# Patient Record
Sex: FEMALE | Race: ASIAN | NOT HISPANIC OR LATINO
[De-identification: names, ages, dates, MRNs, and addresses within clinical notes are randomized per-mention and may not be internally consistent; named-entity substitution may affect disease eponyms.]

---

## 2023-01-01 ENCOUNTER — TRANSCRIPTION ENCOUNTER (OUTPATIENT)
Age: 88
End: 2023-01-01

## 2023-01-01 ENCOUNTER — INPATIENT (INPATIENT)
Facility: HOSPITAL | Age: 88
LOS: 12 days | Discharge: HOSPICE | DRG: 64 | End: 2023-09-29
Attending: STUDENT IN AN ORGANIZED HEALTH CARE EDUCATION/TRAINING PROGRAM | Admitting: INTERNAL MEDICINE
Payer: MEDICARE

## 2023-01-01 ENCOUNTER — INPATIENT (INPATIENT)
Facility: HOSPITAL | Age: 88
LOS: 4 days | DRG: 951 | End: 2023-10-04
Attending: INTERNAL MEDICINE | Admitting: INTERNAL MEDICINE
Payer: OTHER MISCELLANEOUS

## 2023-01-01 VITALS
WEIGHT: 112.44 LBS | TEMPERATURE: 98 F | SYSTOLIC BLOOD PRESSURE: 111 MMHG | OXYGEN SATURATION: 100 % | DIASTOLIC BLOOD PRESSURE: 75 MMHG | HEART RATE: 69 BPM | RESPIRATION RATE: 20 BRPM | HEIGHT: 60 IN

## 2023-01-01 VITALS
OXYGEN SATURATION: 100 % | RESPIRATION RATE: 20 BRPM | HEART RATE: 69 BPM | TEMPERATURE: 98 F | DIASTOLIC BLOOD PRESSURE: 75 MMHG | SYSTOLIC BLOOD PRESSURE: 111 MMHG

## 2023-01-01 VITALS
RESPIRATION RATE: 24 BRPM | DIASTOLIC BLOOD PRESSURE: 56 MMHG | OXYGEN SATURATION: 88 % | HEART RATE: 160 BPM | SYSTOLIC BLOOD PRESSURE: 89 MMHG | TEMPERATURE: 100 F

## 2023-01-01 VITALS
SYSTOLIC BLOOD PRESSURE: 142 MMHG | HEART RATE: 78 BPM | DIASTOLIC BLOOD PRESSURE: 80 MMHG | RESPIRATION RATE: 16 BRPM | OXYGEN SATURATION: 97 % | WEIGHT: 112.44 LBS

## 2023-01-01 DIAGNOSIS — I10 ESSENTIAL (PRIMARY) HYPERTENSION: ICD-10-CM

## 2023-01-01 DIAGNOSIS — R29.732 NIHSS SCORE 32: ICD-10-CM

## 2023-01-01 DIAGNOSIS — R53.2 FUNCTIONAL QUADRIPLEGIA: ICD-10-CM

## 2023-01-01 DIAGNOSIS — I48.91 UNSPECIFIED ATRIAL FIBRILLATION: ICD-10-CM

## 2023-01-01 DIAGNOSIS — I63.512 CEREBRAL INFARCTION DUE TO UNSPECIFIED OCCLUSION OR STENOSIS OF LEFT MIDDLE CEREBRAL ARTERY: ICD-10-CM

## 2023-01-01 DIAGNOSIS — R45.1 RESTLESSNESS AND AGITATION: ICD-10-CM

## 2023-01-01 DIAGNOSIS — I63.30 CEREBRAL INFARCTION DUE TO THROMBOSIS OF UNSPECIFIED CEREBRAL ARTERY: ICD-10-CM

## 2023-01-01 DIAGNOSIS — Z71.89 OTHER SPECIFIED COUNSELING: ICD-10-CM

## 2023-01-01 DIAGNOSIS — J15.5 PNEUMONIA DUE TO ESCHERICHIA COLI: ICD-10-CM

## 2023-01-01 DIAGNOSIS — F41.9 ANXIETY DISORDER, UNSPECIFIED: ICD-10-CM

## 2023-01-01 DIAGNOSIS — Z91.89 OTHER SPECIFIED PERSONAL RISK FACTORS, NOT ELSEWHERE CLASSIFIED: ICD-10-CM

## 2023-01-01 DIAGNOSIS — R56.9 UNSPECIFIED CONVULSIONS: ICD-10-CM

## 2023-01-01 DIAGNOSIS — J69.0 PNEUMONITIS DUE TO INHALATION OF FOOD AND VOMIT: ICD-10-CM

## 2023-01-01 DIAGNOSIS — Z51.5 ENCOUNTER FOR PALLIATIVE CARE: ICD-10-CM

## 2023-01-01 DIAGNOSIS — E11.9 TYPE 2 DIABETES MELLITUS WITHOUT COMPLICATIONS: ICD-10-CM

## 2023-01-01 DIAGNOSIS — Z79.82 LONG TERM (CURRENT) USE OF ASPIRIN: ICD-10-CM

## 2023-01-01 DIAGNOSIS — Z78.1 PHYSICAL RESTRAINT STATUS: ICD-10-CM

## 2023-01-01 DIAGNOSIS — R68.89 OTHER GENERAL SYMPTOMS AND SIGNS: ICD-10-CM

## 2023-01-01 DIAGNOSIS — E87.0 HYPEROSMOLALITY AND HYPERNATREMIA: ICD-10-CM

## 2023-01-01 DIAGNOSIS — Z41.8 ENCOUNTER FOR OTHER PROCEDURES FOR PURPOSES OTHER THAN REMEDYING HEALTH STATE: ICD-10-CM

## 2023-01-01 DIAGNOSIS — Z86.73 PERSONAL HISTORY OF TRANSIENT ISCHEMIC ATTACK (TIA), AND CEREBRAL INFARCTION WITHOUT RESIDUAL DEFICITS: ICD-10-CM

## 2023-01-01 DIAGNOSIS — R06.00 DYSPNEA, UNSPECIFIED: ICD-10-CM

## 2023-01-01 DIAGNOSIS — E78.5 HYPERLIPIDEMIA, UNSPECIFIED: ICD-10-CM

## 2023-01-01 DIAGNOSIS — I63.9 CEREBRAL INFARCTION, UNSPECIFIED: ICD-10-CM

## 2023-01-01 DIAGNOSIS — J96.01 ACUTE RESPIRATORY FAILURE WITH HYPOXIA: ICD-10-CM

## 2023-01-01 DIAGNOSIS — D11.9 BENIGN NEOPLASM OF MAJOR SALIVARY GLAND, UNSPECIFIED: ICD-10-CM

## 2023-01-01 LAB
-  AMIKACIN: SIGNIFICANT CHANGE UP
-  AMPICILLIN/SULBACTAM: SIGNIFICANT CHANGE UP
-  AMPICILLIN: SIGNIFICANT CHANGE UP
-  CEFAZOLIN: SIGNIFICANT CHANGE UP
-  CEFTRIAXONE: SIGNIFICANT CHANGE UP
-  CIPROFLOXACIN: SIGNIFICANT CHANGE UP
-  ERTAPENEM: SIGNIFICANT CHANGE UP
-  GENTAMICIN: SIGNIFICANT CHANGE UP
-  MEROPENEM: SIGNIFICANT CHANGE UP
-  PIPERACILLIN/TAZOBACTAM: SIGNIFICANT CHANGE UP
-  TOBRAMYCIN: SIGNIFICANT CHANGE UP
-  TRIMETHOPRIM/SULFAMETHOXAZOLE: SIGNIFICANT CHANGE UP
A1C WITH ESTIMATED AVERAGE GLUCOSE RESULT: 6.4 % — HIGH (ref 4–5.6)
ALBUMIN SERPL ELPH-MCNC: 2.8 G/DL — LOW (ref 3.3–5)
ALBUMIN SERPL ELPH-MCNC: 2.8 G/DL — LOW (ref 3.3–5)
ALBUMIN SERPL ELPH-MCNC: 3 G/DL — LOW (ref 3.3–5)
ALBUMIN SERPL ELPH-MCNC: 3.1 G/DL — LOW (ref 3.3–5)
ALBUMIN SERPL ELPH-MCNC: 3.1 G/DL — LOW (ref 3.3–5)
ALBUMIN SERPL ELPH-MCNC: 3.3 G/DL — SIGNIFICANT CHANGE UP (ref 3.3–5)
ALBUMIN SERPL ELPH-MCNC: 3.3 G/DL — SIGNIFICANT CHANGE UP (ref 3.3–5)
ALBUMIN SERPL ELPH-MCNC: 3.4 G/DL — SIGNIFICANT CHANGE UP (ref 3.3–5)
ALBUMIN SERPL ELPH-MCNC: 3.5 G/DL — SIGNIFICANT CHANGE UP (ref 3.3–5)
ALBUMIN SERPL ELPH-MCNC: 3.5 G/DL — SIGNIFICANT CHANGE UP (ref 3.3–5)
ALBUMIN SERPL ELPH-MCNC: 3.7 G/DL — SIGNIFICANT CHANGE UP (ref 3.3–5)
ALBUMIN SERPL ELPH-MCNC: 3.7 G/DL — SIGNIFICANT CHANGE UP (ref 3.3–5)
ALBUMIN SERPL ELPH-MCNC: 4.5 G/DL — SIGNIFICANT CHANGE UP (ref 3.3–5)
ALBUMIN SERPL ELPH-MCNC: 4.8 G/DL — SIGNIFICANT CHANGE UP (ref 3.3–5)
ALP SERPL-CCNC: 46 U/L — SIGNIFICANT CHANGE UP (ref 40–120)
ALP SERPL-CCNC: 48 U/L — SIGNIFICANT CHANGE UP (ref 40–120)
ALP SERPL-CCNC: 50 U/L — SIGNIFICANT CHANGE UP (ref 40–120)
ALP SERPL-CCNC: 53 U/L — SIGNIFICANT CHANGE UP (ref 40–120)
ALP SERPL-CCNC: 55 U/L — SIGNIFICANT CHANGE UP (ref 40–120)
ALP SERPL-CCNC: 56 U/L — SIGNIFICANT CHANGE UP (ref 40–120)
ALP SERPL-CCNC: 58 U/L — SIGNIFICANT CHANGE UP (ref 40–120)
ALP SERPL-CCNC: 60 U/L — SIGNIFICANT CHANGE UP (ref 40–120)
ALP SERPL-CCNC: 61 U/L — SIGNIFICANT CHANGE UP (ref 40–120)
ALP SERPL-CCNC: 61 U/L — SIGNIFICANT CHANGE UP (ref 40–120)
ALP SERPL-CCNC: 65 U/L — SIGNIFICANT CHANGE UP (ref 40–120)
ALP SERPL-CCNC: 66 U/L — SIGNIFICANT CHANGE UP (ref 40–120)
ALP SERPL-CCNC: 67 U/L — SIGNIFICANT CHANGE UP (ref 40–120)
ALP SERPL-CCNC: 77 U/L — SIGNIFICANT CHANGE UP (ref 40–120)
ALT FLD-CCNC: 10 U/L — SIGNIFICANT CHANGE UP (ref 10–45)
ALT FLD-CCNC: 11 U/L — SIGNIFICANT CHANGE UP (ref 10–45)
ALT FLD-CCNC: 12 U/L — SIGNIFICANT CHANGE UP (ref 10–45)
ALT FLD-CCNC: 12 U/L — SIGNIFICANT CHANGE UP (ref 10–45)
ALT FLD-CCNC: 14 U/L — SIGNIFICANT CHANGE UP (ref 10–45)
ALT FLD-CCNC: 15 U/L — SIGNIFICANT CHANGE UP (ref 10–45)
ALT FLD-CCNC: 16 U/L — SIGNIFICANT CHANGE UP (ref 10–45)
ALT FLD-CCNC: 8 U/L — LOW (ref 10–45)
ALT FLD-CCNC: 8 U/L — LOW (ref 10–45)
ALT FLD-CCNC: 9 U/L — LOW (ref 10–45)
ANION GAP SERPL CALC-SCNC: 10 MMOL/L — SIGNIFICANT CHANGE UP (ref 5–17)
ANION GAP SERPL CALC-SCNC: 11 MMOL/L — SIGNIFICANT CHANGE UP (ref 5–17)
ANION GAP SERPL CALC-SCNC: 11 MMOL/L — SIGNIFICANT CHANGE UP (ref 5–17)
ANION GAP SERPL CALC-SCNC: 12 MMOL/L — SIGNIFICANT CHANGE UP (ref 5–17)
ANION GAP SERPL CALC-SCNC: 12 MMOL/L — SIGNIFICANT CHANGE UP (ref 5–17)
ANION GAP SERPL CALC-SCNC: 14 MMOL/L — SIGNIFICANT CHANGE UP (ref 5–17)
ANION GAP SERPL CALC-SCNC: 6 MMOL/L — SIGNIFICANT CHANGE UP (ref 5–17)
ANION GAP SERPL CALC-SCNC: 7 MMOL/L — SIGNIFICANT CHANGE UP (ref 5–17)
ANION GAP SERPL CALC-SCNC: 8 MMOL/L — SIGNIFICANT CHANGE UP (ref 5–17)
ANION GAP SERPL CALC-SCNC: 9 MMOL/L — SIGNIFICANT CHANGE UP (ref 5–17)
ANION GAP SERPL CALC-SCNC: 9 MMOL/L — SIGNIFICANT CHANGE UP (ref 5–17)
ANISOCYTOSIS BLD QL: SLIGHT — SIGNIFICANT CHANGE UP
ANISOCYTOSIS BLD QL: SLIGHT — SIGNIFICANT CHANGE UP
APTT BLD: 33.3 SEC — SIGNIFICANT CHANGE UP (ref 24.5–35.6)
AST SERPL-CCNC: 25 U/L — SIGNIFICANT CHANGE UP (ref 10–40)
AST SERPL-CCNC: 27 U/L — SIGNIFICANT CHANGE UP (ref 10–40)
AST SERPL-CCNC: 27 U/L — SIGNIFICANT CHANGE UP (ref 10–40)
AST SERPL-CCNC: 28 U/L — SIGNIFICANT CHANGE UP (ref 10–40)
AST SERPL-CCNC: 31 U/L — SIGNIFICANT CHANGE UP (ref 10–40)
AST SERPL-CCNC: 32 U/L — SIGNIFICANT CHANGE UP (ref 10–40)
AST SERPL-CCNC: 33 U/L — SIGNIFICANT CHANGE UP (ref 10–40)
AST SERPL-CCNC: 34 U/L — SIGNIFICANT CHANGE UP (ref 10–40)
AST SERPL-CCNC: 37 U/L — SIGNIFICANT CHANGE UP (ref 10–40)
AST SERPL-CCNC: 39 U/L — SIGNIFICANT CHANGE UP (ref 10–40)
AST SERPL-CCNC: 41 U/L — HIGH (ref 10–40)
AST SERPL-CCNC: 45 U/L — HIGH (ref 10–40)
AST SERPL-CCNC: 46 U/L — HIGH (ref 10–40)
AST SERPL-CCNC: 47 U/L — HIGH (ref 10–40)
BASE EXCESS BLDA CALC-SCNC: -3.9 MMOL/L — LOW (ref -2–3)
BASOPHILS # BLD AUTO: 0 K/UL — SIGNIFICANT CHANGE UP (ref 0–0.2)
BASOPHILS # BLD AUTO: 0.03 K/UL — SIGNIFICANT CHANGE UP (ref 0–0.2)
BASOPHILS # BLD AUTO: 0.04 K/UL — SIGNIFICANT CHANGE UP (ref 0–0.2)
BASOPHILS # BLD AUTO: 0.05 K/UL — SIGNIFICANT CHANGE UP (ref 0–0.2)
BASOPHILS # BLD AUTO: 0.06 K/UL — SIGNIFICANT CHANGE UP (ref 0–0.2)
BASOPHILS # BLD AUTO: 0.07 K/UL — SIGNIFICANT CHANGE UP (ref 0–0.2)
BASOPHILS # BLD AUTO: 0.09 K/UL — SIGNIFICANT CHANGE UP (ref 0–0.2)
BASOPHILS # BLD AUTO: 0.09 K/UL — SIGNIFICANT CHANGE UP (ref 0–0.2)
BASOPHILS # BLD AUTO: 0.1 K/UL — SIGNIFICANT CHANGE UP (ref 0–0.2)
BASOPHILS NFR BLD AUTO: 0 % — SIGNIFICANT CHANGE UP (ref 0–2)
BASOPHILS NFR BLD AUTO: 0.2 % — SIGNIFICANT CHANGE UP (ref 0–2)
BASOPHILS NFR BLD AUTO: 0.2 % — SIGNIFICANT CHANGE UP (ref 0–2)
BASOPHILS NFR BLD AUTO: 0.3 % — SIGNIFICANT CHANGE UP (ref 0–2)
BASOPHILS NFR BLD AUTO: 0.4 % — SIGNIFICANT CHANGE UP (ref 0–2)
BASOPHILS NFR BLD AUTO: 0.5 % — SIGNIFICANT CHANGE UP (ref 0–2)
BASOPHILS NFR BLD AUTO: 0.5 % — SIGNIFICANT CHANGE UP (ref 0–2)
BASOPHILS NFR BLD AUTO: 0.8 % — SIGNIFICANT CHANGE UP (ref 0–2)
BASOPHILS NFR BLD AUTO: 0.8 % — SIGNIFICANT CHANGE UP (ref 0–2)
BASOPHILS NFR BLD AUTO: 0.9 % — SIGNIFICANT CHANGE UP (ref 0–2)
BILIRUB SERPL-MCNC: 0.4 MG/DL — SIGNIFICANT CHANGE UP (ref 0.2–1.2)
BILIRUB SERPL-MCNC: 0.5 MG/DL — SIGNIFICANT CHANGE UP (ref 0.2–1.2)
BILIRUB SERPL-MCNC: 0.6 MG/DL — SIGNIFICANT CHANGE UP (ref 0.2–1.2)
BILIRUB SERPL-MCNC: 0.6 MG/DL — SIGNIFICANT CHANGE UP (ref 0.2–1.2)
BILIRUB SERPL-MCNC: 0.7 MG/DL — SIGNIFICANT CHANGE UP (ref 0.2–1.2)
BILIRUB SERPL-MCNC: 0.8 MG/DL — SIGNIFICANT CHANGE UP (ref 0.2–1.2)
BILIRUB SERPL-MCNC: 1 MG/DL — SIGNIFICANT CHANGE UP (ref 0.2–1.2)
BILIRUB SERPL-MCNC: 1.1 MG/DL — SIGNIFICANT CHANGE UP (ref 0.2–1.2)
BLD GP AB SCN SERPL QL: NEGATIVE — SIGNIFICANT CHANGE UP
BUN SERPL-MCNC: 21 MG/DL — SIGNIFICANT CHANGE UP (ref 7–23)
BUN SERPL-MCNC: 22 MG/DL — SIGNIFICANT CHANGE UP (ref 7–23)
BUN SERPL-MCNC: 22 MG/DL — SIGNIFICANT CHANGE UP (ref 7–23)
BUN SERPL-MCNC: 23 MG/DL — SIGNIFICANT CHANGE UP (ref 7–23)
BUN SERPL-MCNC: 24 MG/DL — HIGH (ref 7–23)
BUN SERPL-MCNC: 25 MG/DL — HIGH (ref 7–23)
BUN SERPL-MCNC: 25 MG/DL — HIGH (ref 7–23)
BUN SERPL-MCNC: 26 MG/DL — HIGH (ref 7–23)
BUN SERPL-MCNC: 28 MG/DL — HIGH (ref 7–23)
BUN SERPL-MCNC: 29 MG/DL — HIGH (ref 7–23)
BUN SERPL-MCNC: 30 MG/DL — HIGH (ref 7–23)
BUN SERPL-MCNC: 30 MG/DL — HIGH (ref 7–23)
BUN SERPL-MCNC: 31 MG/DL — HIGH (ref 7–23)
BUN SERPL-MCNC: 32 MG/DL — HIGH (ref 7–23)
BUN SERPL-MCNC: 33 MG/DL — HIGH (ref 7–23)
BUN SERPL-MCNC: 34 MG/DL — HIGH (ref 7–23)
BUN SERPL-MCNC: 34 MG/DL — HIGH (ref 7–23)
BUN SERPL-MCNC: 35 MG/DL — HIGH (ref 7–23)
BUN SERPL-MCNC: 38 MG/DL — HIGH (ref 7–23)
BUN SERPL-MCNC: 38 MG/DL — HIGH (ref 7–23)
BUN SERPL-MCNC: 39 MG/DL — HIGH (ref 7–23)
BUN SERPL-MCNC: 41 MG/DL — HIGH (ref 7–23)
BUN SERPL-MCNC: 42 MG/DL — HIGH (ref 7–23)
BURR CELLS BLD QL SMEAR: PRESENT — SIGNIFICANT CHANGE UP
BURR CELLS BLD QL SMEAR: PRESENT — SIGNIFICANT CHANGE UP
CALCIUM SERPL-MCNC: 10.3 MG/DL — SIGNIFICANT CHANGE UP (ref 8.4–10.5)
CALCIUM SERPL-MCNC: 10.5 MG/DL — SIGNIFICANT CHANGE UP (ref 8.4–10.5)
CALCIUM SERPL-MCNC: 8.8 MG/DL — SIGNIFICANT CHANGE UP (ref 8.4–10.5)
CALCIUM SERPL-MCNC: 8.9 MG/DL — SIGNIFICANT CHANGE UP (ref 8.4–10.5)
CALCIUM SERPL-MCNC: 9 MG/DL — SIGNIFICANT CHANGE UP (ref 8.4–10.5)
CALCIUM SERPL-MCNC: 9.1 MG/DL — SIGNIFICANT CHANGE UP (ref 8.4–10.5)
CALCIUM SERPL-MCNC: 9.2 MG/DL — SIGNIFICANT CHANGE UP (ref 8.4–10.5)
CALCIUM SERPL-MCNC: 9.3 MG/DL — SIGNIFICANT CHANGE UP (ref 8.4–10.5)
CALCIUM SERPL-MCNC: 9.4 MG/DL — SIGNIFICANT CHANGE UP (ref 8.4–10.5)
CALCIUM SERPL-MCNC: 9.5 MG/DL — SIGNIFICANT CHANGE UP (ref 8.4–10.5)
CALCIUM SERPL-MCNC: 9.6 MG/DL — SIGNIFICANT CHANGE UP (ref 8.4–10.5)
CALCIUM SERPL-MCNC: 9.7 MG/DL — SIGNIFICANT CHANGE UP (ref 8.4–10.5)
CALCIUM SERPL-MCNC: 9.8 MG/DL — SIGNIFICANT CHANGE UP (ref 8.4–10.5)
CHLORIDE SERPL-SCNC: 100 MMOL/L — SIGNIFICANT CHANGE UP (ref 96–108)
CHLORIDE SERPL-SCNC: 101 MMOL/L — SIGNIFICANT CHANGE UP (ref 96–108)
CHLORIDE SERPL-SCNC: 101 MMOL/L — SIGNIFICANT CHANGE UP (ref 96–108)
CHLORIDE SERPL-SCNC: 102 MMOL/L — SIGNIFICANT CHANGE UP (ref 96–108)
CHLORIDE SERPL-SCNC: 103 MMOL/L — SIGNIFICANT CHANGE UP (ref 96–108)
CHLORIDE SERPL-SCNC: 105 MMOL/L — SIGNIFICANT CHANGE UP (ref 96–108)
CHLORIDE SERPL-SCNC: 106 MMOL/L — SIGNIFICANT CHANGE UP (ref 96–108)
CHLORIDE SERPL-SCNC: 108 MMOL/L — SIGNIFICANT CHANGE UP (ref 96–108)
CHLORIDE SERPL-SCNC: 110 MMOL/L — HIGH (ref 96–108)
CHLORIDE SERPL-SCNC: 110 MMOL/L — HIGH (ref 96–108)
CHLORIDE SERPL-SCNC: 111 MMOL/L — HIGH (ref 96–108)
CHLORIDE SERPL-SCNC: 114 MMOL/L — HIGH (ref 96–108)
CHLORIDE SERPL-SCNC: 117 MMOL/L — HIGH (ref 96–108)
CHLORIDE SERPL-SCNC: 118 MMOL/L — HIGH (ref 96–108)
CHLORIDE SERPL-SCNC: 119 MMOL/L — HIGH (ref 96–108)
CHLORIDE SERPL-SCNC: 120 MMOL/L — HIGH (ref 96–108)
CHLORIDE SERPL-SCNC: 120 MMOL/L — HIGH (ref 96–108)
CHLORIDE SERPL-SCNC: 121 MMOL/L — HIGH (ref 96–108)
CHLORIDE SERPL-SCNC: 122 MMOL/L — HIGH (ref 96–108)
CHLORIDE SERPL-SCNC: 123 MMOL/L — HIGH (ref 96–108)
CHLORIDE SERPL-SCNC: 123 MMOL/L — HIGH (ref 96–108)
CHLORIDE SERPL-SCNC: 124 MMOL/L — HIGH (ref 96–108)
CHLORIDE SERPL-SCNC: 97 MMOL/L — SIGNIFICANT CHANGE UP (ref 96–108)
CHOLEST SERPL-MCNC: 144 MG/DL — SIGNIFICANT CHANGE UP
CO2 BLDA-SCNC: 22 MMOL/L — SIGNIFICANT CHANGE UP (ref 19–24)
CO2 SERPL-SCNC: 16 MMOL/L — LOW (ref 22–31)
CO2 SERPL-SCNC: 19 MMOL/L — LOW (ref 22–31)
CO2 SERPL-SCNC: 20 MMOL/L — LOW (ref 22–31)
CO2 SERPL-SCNC: 21 MMOL/L — LOW (ref 22–31)
CO2 SERPL-SCNC: 22 MMOL/L — SIGNIFICANT CHANGE UP (ref 22–31)
CO2 SERPL-SCNC: 23 MMOL/L — SIGNIFICANT CHANGE UP (ref 22–31)
CO2 SERPL-SCNC: 23 MMOL/L — SIGNIFICANT CHANGE UP (ref 22–31)
CO2 SERPL-SCNC: 24 MMOL/L — SIGNIFICANT CHANGE UP (ref 22–31)
CO2 SERPL-SCNC: 24 MMOL/L — SIGNIFICANT CHANGE UP (ref 22–31)
CO2 SERPL-SCNC: 25 MMOL/L — SIGNIFICANT CHANGE UP (ref 22–31)
CO2 SERPL-SCNC: 26 MMOL/L — SIGNIFICANT CHANGE UP (ref 22–31)
CO2 SERPL-SCNC: 27 MMOL/L — SIGNIFICANT CHANGE UP (ref 22–31)
CO2 SERPL-SCNC: 28 MMOL/L — SIGNIFICANT CHANGE UP (ref 22–31)
CO2 SERPL-SCNC: 28 MMOL/L — SIGNIFICANT CHANGE UP (ref 22–31)
CO2 SERPL-SCNC: 29 MMOL/L — SIGNIFICANT CHANGE UP (ref 22–31)
CO2 SERPL-SCNC: 30 MMOL/L — SIGNIFICANT CHANGE UP (ref 22–31)
CREAT SERPL-MCNC: 0.53 MG/DL — SIGNIFICANT CHANGE UP (ref 0.5–1.3)
CREAT SERPL-MCNC: 0.58 MG/DL — SIGNIFICANT CHANGE UP (ref 0.5–1.3)
CREAT SERPL-MCNC: 0.61 MG/DL — SIGNIFICANT CHANGE UP (ref 0.5–1.3)
CREAT SERPL-MCNC: 0.62 MG/DL — SIGNIFICANT CHANGE UP (ref 0.5–1.3)
CREAT SERPL-MCNC: 0.63 MG/DL — SIGNIFICANT CHANGE UP (ref 0.5–1.3)
CREAT SERPL-MCNC: 0.63 MG/DL — SIGNIFICANT CHANGE UP (ref 0.5–1.3)
CREAT SERPL-MCNC: 0.64 MG/DL — SIGNIFICANT CHANGE UP (ref 0.5–1.3)
CREAT SERPL-MCNC: 0.65 MG/DL — SIGNIFICANT CHANGE UP (ref 0.5–1.3)
CREAT SERPL-MCNC: 0.65 MG/DL — SIGNIFICANT CHANGE UP (ref 0.5–1.3)
CREAT SERPL-MCNC: 0.66 MG/DL — SIGNIFICANT CHANGE UP (ref 0.5–1.3)
CREAT SERPL-MCNC: 0.67 MG/DL — SIGNIFICANT CHANGE UP (ref 0.5–1.3)
CREAT SERPL-MCNC: 0.68 MG/DL — SIGNIFICANT CHANGE UP (ref 0.5–1.3)
CREAT SERPL-MCNC: 0.68 MG/DL — SIGNIFICANT CHANGE UP (ref 0.5–1.3)
CREAT SERPL-MCNC: 0.71 MG/DL — SIGNIFICANT CHANGE UP (ref 0.5–1.3)
CREAT SERPL-MCNC: 0.71 MG/DL — SIGNIFICANT CHANGE UP (ref 0.5–1.3)
CREAT SERPL-MCNC: 0.72 MG/DL — SIGNIFICANT CHANGE UP (ref 0.5–1.3)
CREAT SERPL-MCNC: 0.73 MG/DL — SIGNIFICANT CHANGE UP (ref 0.5–1.3)
CREAT SERPL-MCNC: 0.74 MG/DL — SIGNIFICANT CHANGE UP (ref 0.5–1.3)
CREAT SERPL-MCNC: 0.77 MG/DL — SIGNIFICANT CHANGE UP (ref 0.5–1.3)
CREAT SERPL-MCNC: 0.79 MG/DL — SIGNIFICANT CHANGE UP (ref 0.5–1.3)
CREAT SERPL-MCNC: 0.8 MG/DL — SIGNIFICANT CHANGE UP (ref 0.5–1.3)
CREAT SERPL-MCNC: 0.81 MG/DL — SIGNIFICANT CHANGE UP (ref 0.5–1.3)
CREAT SERPL-MCNC: 0.81 MG/DL — SIGNIFICANT CHANGE UP (ref 0.5–1.3)
CREAT SERPL-MCNC: 0.83 MG/DL — SIGNIFICANT CHANGE UP (ref 0.5–1.3)
CREAT SERPL-MCNC: 0.86 MG/DL — SIGNIFICANT CHANGE UP (ref 0.5–1.3)
CREAT SERPL-MCNC: 0.87 MG/DL — SIGNIFICANT CHANGE UP (ref 0.5–1.3)
CREAT SERPL-MCNC: 1.05 MG/DL — SIGNIFICANT CHANGE UP (ref 0.5–1.3)
CULTURE RESULTS: SIGNIFICANT CHANGE UP
DACRYOCYTES BLD QL SMEAR: SLIGHT — SIGNIFICANT CHANGE UP
DACRYOCYTES BLD QL SMEAR: SLIGHT — SIGNIFICANT CHANGE UP
EGFR: 51 ML/MIN/1.73M2 — LOW
EGFR: 64 ML/MIN/1.73M2 — SIGNIFICANT CHANGE UP
EGFR: 65 ML/MIN/1.73M2 — SIGNIFICANT CHANGE UP
EGFR: 68 ML/MIN/1.73M2 — SIGNIFICANT CHANGE UP
EGFR: 70 ML/MIN/1.73M2 — SIGNIFICANT CHANGE UP
EGFR: 70 ML/MIN/1.73M2 — SIGNIFICANT CHANGE UP
EGFR: 71 ML/MIN/1.73M2 — SIGNIFICANT CHANGE UP
EGFR: 72 ML/MIN/1.73M2 — SIGNIFICANT CHANGE UP
EGFR: 74 ML/MIN/1.73M2 — SIGNIFICANT CHANGE UP
EGFR: 78 ML/MIN/1.73M2 — SIGNIFICANT CHANGE UP
EGFR: 79 ML/MIN/1.73M2 — SIGNIFICANT CHANGE UP
EGFR: 80 ML/MIN/1.73M2 — SIGNIFICANT CHANGE UP
EGFR: 82 ML/MIN/1.73M2 — SIGNIFICANT CHANGE UP
EGFR: 82 ML/MIN/1.73M2 — SIGNIFICANT CHANGE UP
EGFR: 84 ML/MIN/1.73M2 — SIGNIFICANT CHANGE UP
EGFR: 85 ML/MIN/1.73M2 — SIGNIFICANT CHANGE UP
EGFR: 86 ML/MIN/1.73M2 — SIGNIFICANT CHANGE UP
EGFR: 86 ML/MIN/1.73M2 — SIGNIFICANT CHANGE UP
EGFR: 87 ML/MIN/1.73M2 — SIGNIFICANT CHANGE UP
EGFR: 89 ML/MIN/1.73M2 — SIGNIFICANT CHANGE UP
EOSINOPHIL # BLD AUTO: 0 K/UL — SIGNIFICANT CHANGE UP (ref 0–0.5)
EOSINOPHIL # BLD AUTO: 0.02 K/UL — SIGNIFICANT CHANGE UP (ref 0–0.5)
EOSINOPHIL # BLD AUTO: 0.04 K/UL — SIGNIFICANT CHANGE UP (ref 0–0.5)
EOSINOPHIL # BLD AUTO: 0.04 K/UL — SIGNIFICANT CHANGE UP (ref 0–0.5)
EOSINOPHIL # BLD AUTO: 0.09 K/UL — SIGNIFICANT CHANGE UP (ref 0–0.5)
EOSINOPHIL # BLD AUTO: 0.11 K/UL — SIGNIFICANT CHANGE UP (ref 0–0.5)
EOSINOPHIL # BLD AUTO: 0.13 K/UL — SIGNIFICANT CHANGE UP (ref 0–0.5)
EOSINOPHIL # BLD AUTO: 0.14 K/UL — SIGNIFICANT CHANGE UP (ref 0–0.5)
EOSINOPHIL # BLD AUTO: 0.16 K/UL — SIGNIFICANT CHANGE UP (ref 0–0.5)
EOSINOPHIL # BLD AUTO: 0.18 K/UL — SIGNIFICANT CHANGE UP (ref 0–0.5)
EOSINOPHIL # BLD AUTO: 0.23 K/UL — SIGNIFICANT CHANGE UP (ref 0–0.5)
EOSINOPHIL # BLD AUTO: 0.27 K/UL — SIGNIFICANT CHANGE UP (ref 0–0.5)
EOSINOPHIL # BLD AUTO: 0.28 K/UL — SIGNIFICANT CHANGE UP (ref 0–0.5)
EOSINOPHIL NFR BLD AUTO: 0 % — SIGNIFICANT CHANGE UP (ref 0–6)
EOSINOPHIL NFR BLD AUTO: 0.1 % — SIGNIFICANT CHANGE UP (ref 0–6)
EOSINOPHIL NFR BLD AUTO: 0.3 % — SIGNIFICANT CHANGE UP (ref 0–6)
EOSINOPHIL NFR BLD AUTO: 0.3 % — SIGNIFICANT CHANGE UP (ref 0–6)
EOSINOPHIL NFR BLD AUTO: 0.9 % — SIGNIFICANT CHANGE UP (ref 0–6)
EOSINOPHIL NFR BLD AUTO: 0.9 % — SIGNIFICANT CHANGE UP (ref 0–6)
EOSINOPHIL NFR BLD AUTO: 1 % — SIGNIFICANT CHANGE UP (ref 0–6)
EOSINOPHIL NFR BLD AUTO: 1 % — SIGNIFICANT CHANGE UP (ref 0–6)
EOSINOPHIL NFR BLD AUTO: 1.4 % — SIGNIFICANT CHANGE UP (ref 0–6)
EOSINOPHIL NFR BLD AUTO: 1.9 % — SIGNIFICANT CHANGE UP (ref 0–6)
EOSINOPHIL NFR BLD AUTO: 2.4 % — SIGNIFICANT CHANGE UP (ref 0–6)
EOSINOPHIL NFR BLD AUTO: 2.6 % — SIGNIFICANT CHANGE UP (ref 0–6)
EOSINOPHIL NFR BLD AUTO: 2.7 % — SIGNIFICANT CHANGE UP (ref 0–6)
ESTIMATED AVERAGE GLUCOSE: 137 MG/DL — HIGH (ref 68–114)
GIANT PLATELETS BLD QL SMEAR: PRESENT — SIGNIFICANT CHANGE UP
GIANT PLATELETS BLD QL SMEAR: PRESENT — SIGNIFICANT CHANGE UP
GLUCOSE BLDC GLUCOMTR-MCNC: 135 MG/DL — HIGH (ref 70–99)
GLUCOSE BLDC GLUCOMTR-MCNC: 144 MG/DL — HIGH (ref 70–99)
GLUCOSE BLDC GLUCOMTR-MCNC: 146 MG/DL — HIGH (ref 70–99)
GLUCOSE BLDC GLUCOMTR-MCNC: 147 MG/DL — HIGH (ref 70–99)
GLUCOSE BLDC GLUCOMTR-MCNC: 150 MG/DL — HIGH (ref 70–99)
GLUCOSE BLDC GLUCOMTR-MCNC: 150 MG/DL — HIGH (ref 70–99)
GLUCOSE BLDC GLUCOMTR-MCNC: 151 MG/DL — HIGH (ref 70–99)
GLUCOSE BLDC GLUCOMTR-MCNC: 151 MG/DL — HIGH (ref 70–99)
GLUCOSE BLDC GLUCOMTR-MCNC: 153 MG/DL — HIGH (ref 70–99)
GLUCOSE BLDC GLUCOMTR-MCNC: 158 MG/DL — HIGH (ref 70–99)
GLUCOSE BLDC GLUCOMTR-MCNC: 163 MG/DL — HIGH (ref 70–99)
GLUCOSE BLDC GLUCOMTR-MCNC: 167 MG/DL — HIGH (ref 70–99)
GLUCOSE BLDC GLUCOMTR-MCNC: 167 MG/DL — HIGH (ref 70–99)
GLUCOSE BLDC GLUCOMTR-MCNC: 168 MG/DL — HIGH (ref 70–99)
GLUCOSE BLDC GLUCOMTR-MCNC: 176 MG/DL — HIGH (ref 70–99)
GLUCOSE BLDC GLUCOMTR-MCNC: 184 MG/DL — HIGH (ref 70–99)
GLUCOSE BLDC GLUCOMTR-MCNC: 190 MG/DL — HIGH (ref 70–99)
GLUCOSE BLDC GLUCOMTR-MCNC: 191 MG/DL — HIGH (ref 70–99)
GLUCOSE BLDC GLUCOMTR-MCNC: 195 MG/DL — HIGH (ref 70–99)
GLUCOSE BLDC GLUCOMTR-MCNC: 196 MG/DL — HIGH (ref 70–99)
GLUCOSE BLDC GLUCOMTR-MCNC: 199 MG/DL — HIGH (ref 70–99)
GLUCOSE BLDC GLUCOMTR-MCNC: 208 MG/DL — HIGH (ref 70–99)
GLUCOSE BLDC GLUCOMTR-MCNC: 219 MG/DL — HIGH (ref 70–99)
GLUCOSE BLDC GLUCOMTR-MCNC: 219 MG/DL — HIGH (ref 70–99)
GLUCOSE BLDC GLUCOMTR-MCNC: 222 MG/DL — HIGH (ref 70–99)
GLUCOSE BLDC GLUCOMTR-MCNC: 228 MG/DL — HIGH (ref 70–99)
GLUCOSE BLDC GLUCOMTR-MCNC: 233 MG/DL — HIGH (ref 70–99)
GLUCOSE BLDC GLUCOMTR-MCNC: 234 MG/DL — HIGH (ref 70–99)
GLUCOSE BLDC GLUCOMTR-MCNC: 235 MG/DL — HIGH (ref 70–99)
GLUCOSE BLDC GLUCOMTR-MCNC: 237 MG/DL — HIGH (ref 70–99)
GLUCOSE BLDC GLUCOMTR-MCNC: 239 MG/DL — HIGH (ref 70–99)
GLUCOSE BLDC GLUCOMTR-MCNC: 245 MG/DL — HIGH (ref 70–99)
GLUCOSE BLDC GLUCOMTR-MCNC: 246 MG/DL — HIGH (ref 70–99)
GLUCOSE BLDC GLUCOMTR-MCNC: 247 MG/DL — HIGH (ref 70–99)
GLUCOSE BLDC GLUCOMTR-MCNC: 247 MG/DL — HIGH (ref 70–99)
GLUCOSE BLDC GLUCOMTR-MCNC: 250 MG/DL — HIGH (ref 70–99)
GLUCOSE BLDC GLUCOMTR-MCNC: 251 MG/DL — HIGH (ref 70–99)
GLUCOSE BLDC GLUCOMTR-MCNC: 252 MG/DL — HIGH (ref 70–99)
GLUCOSE BLDC GLUCOMTR-MCNC: 252 MG/DL — HIGH (ref 70–99)
GLUCOSE BLDC GLUCOMTR-MCNC: 253 MG/DL — HIGH (ref 70–99)
GLUCOSE BLDC GLUCOMTR-MCNC: 253 MG/DL — HIGH (ref 70–99)
GLUCOSE BLDC GLUCOMTR-MCNC: 255 MG/DL — HIGH (ref 70–99)
GLUCOSE BLDC GLUCOMTR-MCNC: 258 MG/DL — HIGH (ref 70–99)
GLUCOSE BLDC GLUCOMTR-MCNC: 259 MG/DL — HIGH (ref 70–99)
GLUCOSE BLDC GLUCOMTR-MCNC: 259 MG/DL — HIGH (ref 70–99)
GLUCOSE BLDC GLUCOMTR-MCNC: 261 MG/DL — HIGH (ref 70–99)
GLUCOSE BLDC GLUCOMTR-MCNC: 262 MG/DL — HIGH (ref 70–99)
GLUCOSE BLDC GLUCOMTR-MCNC: 300 MG/DL — HIGH (ref 70–99)
GLUCOSE BLDC GLUCOMTR-MCNC: 310 MG/DL — HIGH (ref 70–99)
GLUCOSE SERPL-MCNC: 145 MG/DL — HIGH (ref 70–99)
GLUCOSE SERPL-MCNC: 157 MG/DL — HIGH (ref 70–99)
GLUCOSE SERPL-MCNC: 164 MG/DL — HIGH (ref 70–99)
GLUCOSE SERPL-MCNC: 166 MG/DL — HIGH (ref 70–99)
GLUCOSE SERPL-MCNC: 171 MG/DL — HIGH (ref 70–99)
GLUCOSE SERPL-MCNC: 171 MG/DL — HIGH (ref 70–99)
GLUCOSE SERPL-MCNC: 172 MG/DL — HIGH (ref 70–99)
GLUCOSE SERPL-MCNC: 174 MG/DL — HIGH (ref 70–99)
GLUCOSE SERPL-MCNC: 175 MG/DL — HIGH (ref 70–99)
GLUCOSE SERPL-MCNC: 178 MG/DL — HIGH (ref 70–99)
GLUCOSE SERPL-MCNC: 182 MG/DL — HIGH (ref 70–99)
GLUCOSE SERPL-MCNC: 183 MG/DL — HIGH (ref 70–99)
GLUCOSE SERPL-MCNC: 203 MG/DL — HIGH (ref 70–99)
GLUCOSE SERPL-MCNC: 222 MG/DL — HIGH (ref 70–99)
GLUCOSE SERPL-MCNC: 239 MG/DL — HIGH (ref 70–99)
GLUCOSE SERPL-MCNC: 244 MG/DL — HIGH (ref 70–99)
GLUCOSE SERPL-MCNC: 246 MG/DL — HIGH (ref 70–99)
GLUCOSE SERPL-MCNC: 249 MG/DL — HIGH (ref 70–99)
GLUCOSE SERPL-MCNC: 250 MG/DL — HIGH (ref 70–99)
GLUCOSE SERPL-MCNC: 255 MG/DL — HIGH (ref 70–99)
GLUCOSE SERPL-MCNC: 259 MG/DL — HIGH (ref 70–99)
GLUCOSE SERPL-MCNC: 264 MG/DL — HIGH (ref 70–99)
GLUCOSE SERPL-MCNC: 266 MG/DL — HIGH (ref 70–99)
GLUCOSE SERPL-MCNC: 267 MG/DL — HIGH (ref 70–99)
GLUCOSE SERPL-MCNC: 267 MG/DL — HIGH (ref 70–99)
GLUCOSE SERPL-MCNC: 268 MG/DL — HIGH (ref 70–99)
GLUCOSE SERPL-MCNC: 274 MG/DL — HIGH (ref 70–99)
GLUCOSE SERPL-MCNC: 289 MG/DL — HIGH (ref 70–99)
GLUCOSE SERPL-MCNC: 297 MG/DL — HIGH (ref 70–99)
GLUCOSE SERPL-MCNC: 298 MG/DL — HIGH (ref 70–99)
GLUCOSE SERPL-MCNC: 320 MG/DL — HIGH (ref 70–99)
GRAM STN FLD: SIGNIFICANT CHANGE UP
HCO3 BLDA-SCNC: 21 MMOL/L — SIGNIFICANT CHANGE UP (ref 21–28)
HCT VFR BLD CALC: 32.5 % — LOW (ref 34.5–45)
HCT VFR BLD CALC: 33.4 % — LOW (ref 34.5–45)
HCT VFR BLD CALC: 35.3 % — SIGNIFICANT CHANGE UP (ref 34.5–45)
HCT VFR BLD CALC: 36.3 % — SIGNIFICANT CHANGE UP (ref 34.5–45)
HCT VFR BLD CALC: 39.3 % — SIGNIFICANT CHANGE UP (ref 34.5–45)
HCT VFR BLD CALC: 39.4 % — SIGNIFICANT CHANGE UP (ref 34.5–45)
HCT VFR BLD CALC: 39.7 % — SIGNIFICANT CHANGE UP (ref 34.5–45)
HCT VFR BLD CALC: 40 % — SIGNIFICANT CHANGE UP (ref 34.5–45)
HCT VFR BLD CALC: 40.5 % — SIGNIFICANT CHANGE UP (ref 34.5–45)
HCT VFR BLD CALC: 40.5 % — SIGNIFICANT CHANGE UP (ref 34.5–45)
HCT VFR BLD CALC: 40.7 % — SIGNIFICANT CHANGE UP (ref 34.5–45)
HCT VFR BLD CALC: 41.4 % — SIGNIFICANT CHANGE UP (ref 34.5–45)
HCT VFR BLD CALC: 42.8 % — SIGNIFICANT CHANGE UP (ref 34.5–45)
HCT VFR BLD CALC: 43.1 % — SIGNIFICANT CHANGE UP (ref 34.5–45)
HCT VFR BLD CALC: 46 % — HIGH (ref 34.5–45)
HDLC SERPL-MCNC: 42 MG/DL — LOW
HGB BLD-MCNC: 10.9 G/DL — LOW (ref 11.5–15.5)
HGB BLD-MCNC: 11.3 G/DL — LOW (ref 11.5–15.5)
HGB BLD-MCNC: 11.4 G/DL — LOW (ref 11.5–15.5)
HGB BLD-MCNC: 12.3 G/DL — SIGNIFICANT CHANGE UP (ref 11.5–15.5)
HGB BLD-MCNC: 12.4 G/DL — SIGNIFICANT CHANGE UP (ref 11.5–15.5)
HGB BLD-MCNC: 12.6 G/DL — SIGNIFICANT CHANGE UP (ref 11.5–15.5)
HGB BLD-MCNC: 12.7 G/DL — SIGNIFICANT CHANGE UP (ref 11.5–15.5)
HGB BLD-MCNC: 13 G/DL — SIGNIFICANT CHANGE UP (ref 11.5–15.5)
HGB BLD-MCNC: 13 G/DL — SIGNIFICANT CHANGE UP (ref 11.5–15.5)
HGB BLD-MCNC: 13.2 G/DL — SIGNIFICANT CHANGE UP (ref 11.5–15.5)
HGB BLD-MCNC: 13.4 G/DL — SIGNIFICANT CHANGE UP (ref 11.5–15.5)
HGB BLD-MCNC: 13.8 G/DL — SIGNIFICANT CHANGE UP (ref 11.5–15.5)
HGB BLD-MCNC: 14 G/DL — SIGNIFICANT CHANGE UP (ref 11.5–15.5)
HGB BLD-MCNC: 14.7 G/DL — SIGNIFICANT CHANGE UP (ref 11.5–15.5)
HGB BLD-MCNC: 15.6 G/DL — HIGH (ref 11.5–15.5)
IMM GRANULOCYTES NFR BLD AUTO: 0.5 % — SIGNIFICANT CHANGE UP (ref 0–0.9)
IMM GRANULOCYTES NFR BLD AUTO: 0.5 % — SIGNIFICANT CHANGE UP (ref 0–0.9)
IMM GRANULOCYTES NFR BLD AUTO: 0.6 % — SIGNIFICANT CHANGE UP (ref 0–0.9)
IMM GRANULOCYTES NFR BLD AUTO: 0.7 % — SIGNIFICANT CHANGE UP (ref 0–0.9)
IMM GRANULOCYTES NFR BLD AUTO: 1.6 % — HIGH (ref 0–0.9)
IMM GRANULOCYTES NFR BLD AUTO: 2.7 % — HIGH (ref 0–0.9)
IMM GRANULOCYTES NFR BLD AUTO: 4.6 % — HIGH (ref 0–0.9)
IMM GRANULOCYTES NFR BLD AUTO: 4.6 % — HIGH (ref 0–0.9)
IMM GRANULOCYTES NFR BLD AUTO: 4.7 % — HIGH (ref 0–0.9)
INR BLD: 0.9 — SIGNIFICANT CHANGE UP (ref 0.85–1.18)
LACTATE SERPL-SCNC: 1.9 MMOL/L — SIGNIFICANT CHANGE UP (ref 0.5–2)
LIPID PNL WITH DIRECT LDL SERPL: 72 MG/DL — SIGNIFICANT CHANGE UP
LYMPHOCYTES # BLD AUTO: 0.93 K/UL — LOW (ref 1–3.3)
LYMPHOCYTES # BLD AUTO: 1.21 K/UL — SIGNIFICANT CHANGE UP (ref 1–3.3)
LYMPHOCYTES # BLD AUTO: 1.22 K/UL — SIGNIFICANT CHANGE UP (ref 1–3.3)
LYMPHOCYTES # BLD AUTO: 1.27 K/UL — SIGNIFICANT CHANGE UP (ref 1–3.3)
LYMPHOCYTES # BLD AUTO: 1.36 K/UL — SIGNIFICANT CHANGE UP (ref 1–3.3)
LYMPHOCYTES # BLD AUTO: 1.54 K/UL — SIGNIFICANT CHANGE UP (ref 1–3.3)
LYMPHOCYTES # BLD AUTO: 1.62 K/UL — SIGNIFICANT CHANGE UP (ref 1–3.3)
LYMPHOCYTES # BLD AUTO: 1.64 K/UL — SIGNIFICANT CHANGE UP (ref 1–3.3)
LYMPHOCYTES # BLD AUTO: 1.68 K/UL — SIGNIFICANT CHANGE UP (ref 1–3.3)
LYMPHOCYTES # BLD AUTO: 1.72 K/UL — SIGNIFICANT CHANGE UP (ref 1–3.3)
LYMPHOCYTES # BLD AUTO: 1.77 K/UL — SIGNIFICANT CHANGE UP (ref 1–3.3)
LYMPHOCYTES # BLD AUTO: 1.86 K/UL — SIGNIFICANT CHANGE UP (ref 1–3.3)
LYMPHOCYTES # BLD AUTO: 12.2 % — LOW (ref 13–44)
LYMPHOCYTES # BLD AUTO: 12.8 % — LOW (ref 13–44)
LYMPHOCYTES # BLD AUTO: 13.3 % — SIGNIFICANT CHANGE UP (ref 13–44)
LYMPHOCYTES # BLD AUTO: 13.9 % — SIGNIFICANT CHANGE UP (ref 13–44)
LYMPHOCYTES # BLD AUTO: 14 % — SIGNIFICANT CHANGE UP (ref 13–44)
LYMPHOCYTES # BLD AUTO: 14.3 % — SIGNIFICANT CHANGE UP (ref 13–44)
LYMPHOCYTES # BLD AUTO: 15.8 % — SIGNIFICANT CHANGE UP (ref 13–44)
LYMPHOCYTES # BLD AUTO: 16.4 % — SIGNIFICANT CHANGE UP (ref 13–44)
LYMPHOCYTES # BLD AUTO: 17.7 % — SIGNIFICANT CHANGE UP (ref 13–44)
LYMPHOCYTES # BLD AUTO: 19.7 % — SIGNIFICANT CHANGE UP (ref 13–44)
LYMPHOCYTES # BLD AUTO: 2.12 K/UL — SIGNIFICANT CHANGE UP (ref 1–3.3)
LYMPHOCYTES # BLD AUTO: 6.3 % — LOW (ref 13–44)
LYMPHOCYTES # BLD AUTO: 8.4 % — LOW (ref 13–44)
LYMPHOCYTES # BLD AUTO: 9.9 % — LOW (ref 13–44)
MACROCYTES BLD QL: SLIGHT — SIGNIFICANT CHANGE UP
MACROCYTES BLD QL: SLIGHT — SIGNIFICANT CHANGE UP
MAGNESIUM SERPL-MCNC: 2.2 MG/DL — SIGNIFICANT CHANGE UP (ref 1.6–2.6)
MAGNESIUM SERPL-MCNC: 2.3 MG/DL — SIGNIFICANT CHANGE UP (ref 1.6–2.6)
MAGNESIUM SERPL-MCNC: 2.3 MG/DL — SIGNIFICANT CHANGE UP (ref 1.6–2.6)
MAGNESIUM SERPL-MCNC: 2.4 MG/DL — SIGNIFICANT CHANGE UP (ref 1.6–2.6)
MAGNESIUM SERPL-MCNC: 2.5 MG/DL — SIGNIFICANT CHANGE UP (ref 1.6–2.6)
MAGNESIUM SERPL-MCNC: 2.5 MG/DL — SIGNIFICANT CHANGE UP (ref 1.6–2.6)
MAGNESIUM SERPL-MCNC: 2.6 MG/DL — SIGNIFICANT CHANGE UP (ref 1.6–2.6)
MAGNESIUM SERPL-MCNC: 2.7 MG/DL — HIGH (ref 1.6–2.6)
MANUAL SMEAR VERIFICATION: SIGNIFICANT CHANGE UP
MANUAL SMEAR VERIFICATION: SIGNIFICANT CHANGE UP
MCHC RBC-ENTMCNC: 30.8 PG — SIGNIFICANT CHANGE UP (ref 27–34)
MCHC RBC-ENTMCNC: 30.8 PG — SIGNIFICANT CHANGE UP (ref 27–34)
MCHC RBC-ENTMCNC: 31.1 PG — SIGNIFICANT CHANGE UP (ref 27–34)
MCHC RBC-ENTMCNC: 31.2 GM/DL — LOW (ref 32–36)
MCHC RBC-ENTMCNC: 31.2 GM/DL — LOW (ref 32–36)
MCHC RBC-ENTMCNC: 31.2 PG — SIGNIFICANT CHANGE UP (ref 27–34)
MCHC RBC-ENTMCNC: 31.2 PG — SIGNIFICANT CHANGE UP (ref 27–34)
MCHC RBC-ENTMCNC: 31.4 GM/DL — LOW (ref 32–36)
MCHC RBC-ENTMCNC: 31.4 PG — SIGNIFICANT CHANGE UP (ref 27–34)
MCHC RBC-ENTMCNC: 31.4 PG — SIGNIFICANT CHANGE UP (ref 27–34)
MCHC RBC-ENTMCNC: 31.5 PG — SIGNIFICANT CHANGE UP (ref 27–34)
MCHC RBC-ENTMCNC: 31.6 PG — SIGNIFICANT CHANGE UP (ref 27–34)
MCHC RBC-ENTMCNC: 31.6 PG — SIGNIFICANT CHANGE UP (ref 27–34)
MCHC RBC-ENTMCNC: 31.7 GM/DL — LOW (ref 32–36)
MCHC RBC-ENTMCNC: 32 PG — SIGNIFICANT CHANGE UP (ref 27–34)
MCHC RBC-ENTMCNC: 32.2 GM/DL — SIGNIFICANT CHANGE UP (ref 32–36)
MCHC RBC-ENTMCNC: 32.3 GM/DL — SIGNIFICANT CHANGE UP (ref 32–36)
MCHC RBC-ENTMCNC: 32.6 GM/DL — SIGNIFICANT CHANGE UP (ref 32–36)
MCHC RBC-ENTMCNC: 33.1 GM/DL — SIGNIFICANT CHANGE UP (ref 32–36)
MCHC RBC-ENTMCNC: 33.5 GM/DL — SIGNIFICANT CHANGE UP (ref 32–36)
MCHC RBC-ENTMCNC: 33.5 GM/DL — SIGNIFICANT CHANGE UP (ref 32–36)
MCHC RBC-ENTMCNC: 33.8 GM/DL — SIGNIFICANT CHANGE UP (ref 32–36)
MCHC RBC-ENTMCNC: 33.9 GM/DL — SIGNIFICANT CHANGE UP (ref 32–36)
MCHC RBC-ENTMCNC: 34.1 GM/DL — SIGNIFICANT CHANGE UP (ref 32–36)
MCHC RBC-ENTMCNC: 34.2 GM/DL — SIGNIFICANT CHANGE UP (ref 32–36)
MCHC RBC-ENTMCNC: 34.6 GM/DL — SIGNIFICANT CHANGE UP (ref 32–36)
MCV RBC AUTO: 91.3 FL — SIGNIFICANT CHANGE UP (ref 80–100)
MCV RBC AUTO: 92.3 FL — SIGNIFICANT CHANGE UP (ref 80–100)
MCV RBC AUTO: 92.4 FL — SIGNIFICANT CHANGE UP (ref 80–100)
MCV RBC AUTO: 92.6 FL — SIGNIFICANT CHANGE UP (ref 80–100)
MCV RBC AUTO: 92.7 FL — SIGNIFICANT CHANGE UP (ref 80–100)
MCV RBC AUTO: 93.9 FL — SIGNIFICANT CHANGE UP (ref 80–100)
MCV RBC AUTO: 94 FL — SIGNIFICANT CHANGE UP (ref 80–100)
MCV RBC AUTO: 94.2 FL — SIGNIFICANT CHANGE UP (ref 80–100)
MCV RBC AUTO: 96.4 FL — SIGNIFICANT CHANGE UP (ref 80–100)
MCV RBC AUTO: 96.4 FL — SIGNIFICANT CHANGE UP (ref 80–100)
MCV RBC AUTO: 96.7 FL — SIGNIFICANT CHANGE UP (ref 80–100)
MCV RBC AUTO: 98 FL — SIGNIFICANT CHANGE UP (ref 80–100)
MCV RBC AUTO: 98.7 FL — SIGNIFICANT CHANGE UP (ref 80–100)
MCV RBC AUTO: 98.8 FL — SIGNIFICANT CHANGE UP (ref 80–100)
MCV RBC AUTO: 99 FL — SIGNIFICANT CHANGE UP (ref 80–100)
METHOD TYPE: SIGNIFICANT CHANGE UP
MICROCYTES BLD QL: SLIGHT — SIGNIFICANT CHANGE UP
MONOCYTES # BLD AUTO: 0.54 K/UL — SIGNIFICANT CHANGE UP (ref 0–0.9)
MONOCYTES # BLD AUTO: 0.57 K/UL — SIGNIFICANT CHANGE UP (ref 0–0.9)
MONOCYTES # BLD AUTO: 0.66 K/UL — SIGNIFICANT CHANGE UP (ref 0–0.9)
MONOCYTES # BLD AUTO: 0.66 K/UL — SIGNIFICANT CHANGE UP (ref 0–0.9)
MONOCYTES # BLD AUTO: 0.67 K/UL — SIGNIFICANT CHANGE UP (ref 0–0.9)
MONOCYTES # BLD AUTO: 0.7 K/UL — SIGNIFICANT CHANGE UP (ref 0–0.9)
MONOCYTES # BLD AUTO: 0.7 K/UL — SIGNIFICANT CHANGE UP (ref 0–0.9)
MONOCYTES # BLD AUTO: 0.73 K/UL — SIGNIFICANT CHANGE UP (ref 0–0.9)
MONOCYTES # BLD AUTO: 0.76 K/UL — SIGNIFICANT CHANGE UP (ref 0–0.9)
MONOCYTES # BLD AUTO: 0.76 K/UL — SIGNIFICANT CHANGE UP (ref 0–0.9)
MONOCYTES # BLD AUTO: 0.83 K/UL — SIGNIFICANT CHANGE UP (ref 0–0.9)
MONOCYTES # BLD AUTO: 0.85 K/UL — SIGNIFICANT CHANGE UP (ref 0–0.9)
MONOCYTES # BLD AUTO: 0.86 K/UL — SIGNIFICANT CHANGE UP (ref 0–0.9)
MONOCYTES NFR BLD AUTO: 5.1 % — SIGNIFICANT CHANGE UP (ref 2–14)
MONOCYTES NFR BLD AUTO: 5.2 % — SIGNIFICANT CHANGE UP (ref 2–14)
MONOCYTES NFR BLD AUTO: 5.3 % — SIGNIFICANT CHANGE UP (ref 2–14)
MONOCYTES NFR BLD AUTO: 5.3 % — SIGNIFICANT CHANGE UP (ref 2–14)
MONOCYTES NFR BLD AUTO: 5.5 % — SIGNIFICANT CHANGE UP (ref 2–14)
MONOCYTES NFR BLD AUTO: 5.6 % — SIGNIFICANT CHANGE UP (ref 2–14)
MONOCYTES NFR BLD AUTO: 5.6 % — SIGNIFICANT CHANGE UP (ref 2–14)
MONOCYTES NFR BLD AUTO: 6.6 % — SIGNIFICANT CHANGE UP (ref 2–14)
MONOCYTES NFR BLD AUTO: 6.7 % — SIGNIFICANT CHANGE UP (ref 2–14)
MONOCYTES NFR BLD AUTO: 7 % — SIGNIFICANT CHANGE UP (ref 2–14)
MONOCYTES NFR BLD AUTO: 7.1 % — SIGNIFICANT CHANGE UP (ref 2–14)
MONOCYTES NFR BLD AUTO: 7.2 % — SIGNIFICANT CHANGE UP (ref 2–14)
MONOCYTES NFR BLD AUTO: 7.7 % — SIGNIFICANT CHANGE UP (ref 2–14)
MYELOCYTES NFR BLD: 1.7 % — HIGH (ref 0–0)
MYELOCYTES NFR BLD: 2.7 % — HIGH (ref 0–0)
NEUTROPHILS # BLD AUTO: 10.26 K/UL — HIGH (ref 1.8–7.4)
NEUTROPHILS # BLD AUTO: 11.62 K/UL — HIGH (ref 1.8–7.4)
NEUTROPHILS # BLD AUTO: 12.78 K/UL — HIGH (ref 1.8–7.4)
NEUTROPHILS # BLD AUTO: 12.82 K/UL — HIGH (ref 1.8–7.4)
NEUTROPHILS # BLD AUTO: 6.37 K/UL — SIGNIFICANT CHANGE UP (ref 1.8–7.4)
NEUTROPHILS # BLD AUTO: 7.03 K/UL — SIGNIFICANT CHANGE UP (ref 1.8–7.4)
NEUTROPHILS # BLD AUTO: 7.18 K/UL — SIGNIFICANT CHANGE UP (ref 1.8–7.4)
NEUTROPHILS # BLD AUTO: 7.36 K/UL — SIGNIFICANT CHANGE UP (ref 1.8–7.4)
NEUTROPHILS # BLD AUTO: 7.81 K/UL — HIGH (ref 1.8–7.4)
NEUTROPHILS # BLD AUTO: 7.94 K/UL — HIGH (ref 1.8–7.4)
NEUTROPHILS # BLD AUTO: 8.46 K/UL — HIGH (ref 1.8–7.4)
NEUTROPHILS # BLD AUTO: 9.5 K/UL — HIGH (ref 1.8–7.4)
NEUTROPHILS # BLD AUTO: 9.52 K/UL — HIGH (ref 1.8–7.4)
NEUTROPHILS NFR BLD AUTO: 65.2 % — SIGNIFICANT CHANGE UP (ref 43–77)
NEUTROPHILS NFR BLD AUTO: 67 % — SIGNIFICANT CHANGE UP (ref 43–77)
NEUTROPHILS NFR BLD AUTO: 71.9 % — SIGNIFICANT CHANGE UP (ref 43–77)
NEUTROPHILS NFR BLD AUTO: 73.6 % — SIGNIFICANT CHANGE UP (ref 43–77)
NEUTROPHILS NFR BLD AUTO: 75.8 % — SIGNIFICANT CHANGE UP (ref 43–77)
NEUTROPHILS NFR BLD AUTO: 75.9 % — SIGNIFICANT CHANGE UP (ref 43–77)
NEUTROPHILS NFR BLD AUTO: 76.3 % — SIGNIFICANT CHANGE UP (ref 43–77)
NEUTROPHILS NFR BLD AUTO: 76.5 % — SIGNIFICANT CHANGE UP (ref 43–77)
NEUTROPHILS NFR BLD AUTO: 79.5 % — HIGH (ref 43–77)
NEUTROPHILS NFR BLD AUTO: 80 % — HIGH (ref 43–77)
NEUTROPHILS NFR BLD AUTO: 83.5 % — HIGH (ref 43–77)
NEUTROPHILS NFR BLD AUTO: 85.2 % — HIGH (ref 43–77)
NEUTROPHILS NFR BLD AUTO: 86.7 % — HIGH (ref 43–77)
NEUTS BAND # BLD: 0.9 % — SIGNIFICANT CHANGE UP (ref 0–8)
NON HDL CHOLESTEROL: 102 MG/DL — SIGNIFICANT CHANGE UP
NRBC # BLD: 0 /100 WBCS — SIGNIFICANT CHANGE UP (ref 0–0)
ORGANISM # SPEC MICROSCOPIC CNT: SIGNIFICANT CHANGE UP
ORGANISM # SPEC MICROSCOPIC CNT: SIGNIFICANT CHANGE UP
OSMOLALITY SERPL: 314 MOSM/KG — HIGH (ref 280–301)
OSMOLALITY SERPL: 326 MOSM/KG — HIGH (ref 280–301)
OSMOLALITY SERPL: 329 MOSM/KG — HIGH (ref 280–301)
OSMOLALITY SERPL: 333 MOSM/KG — HIGH (ref 280–301)
OSMOLALITY SERPL: 337 MOSM/KG — HIGH (ref 280–301)
OSMOLALITY SERPL: 340 MOSM/KG — HIGH (ref 280–301)
OSMOLALITY SERPL: 340 MOSM/KG — HIGH (ref 280–301)
OSMOLALITY SERPL: 341 MOSM/KG — HIGH (ref 280–301)
OSMOLALITY SERPL: 343 MOSM/KG — HIGH (ref 280–301)
OSMOLALITY SERPL: 344 MOSM/KG — HIGH (ref 280–301)
OVALOCYTES BLD QL SMEAR: SIGNIFICANT CHANGE UP
OVALOCYTES BLD QL SMEAR: SLIGHT — SIGNIFICANT CHANGE UP
PCO2 BLDA: 36 MMHG — SIGNIFICANT CHANGE UP (ref 32–45)
PH BLDA: 7.37 — SIGNIFICANT CHANGE UP (ref 7.35–7.45)
PHOSPHATE SERPL-MCNC: 2.1 MG/DL — LOW (ref 2.5–4.5)
PHOSPHATE SERPL-MCNC: 2.2 MG/DL — LOW (ref 2.5–4.5)
PHOSPHATE SERPL-MCNC: 2.7 MG/DL — SIGNIFICANT CHANGE UP (ref 2.5–4.5)
PHOSPHATE SERPL-MCNC: 3.1 MG/DL — SIGNIFICANT CHANGE UP (ref 2.5–4.5)
PHOSPHATE SERPL-MCNC: 3.8 MG/DL — SIGNIFICANT CHANGE UP (ref 2.5–4.5)
PHOSPHATE SERPL-MCNC: 3.8 MG/DL — SIGNIFICANT CHANGE UP (ref 2.5–4.5)
PHOSPHATE SERPL-MCNC: 4.1 MG/DL — SIGNIFICANT CHANGE UP (ref 2.5–4.5)
PHOSPHATE SERPL-MCNC: 4.2 MG/DL — SIGNIFICANT CHANGE UP (ref 2.5–4.5)
PHOSPHATE SERPL-MCNC: 4.2 MG/DL — SIGNIFICANT CHANGE UP (ref 2.5–4.5)
PHOSPHATE SERPL-MCNC: 4.3 MG/DL — SIGNIFICANT CHANGE UP (ref 2.5–4.5)
PHOSPHATE SERPL-MCNC: 4.3 MG/DL — SIGNIFICANT CHANGE UP (ref 2.5–4.5)
PHOSPHATE SERPL-MCNC: 4.8 MG/DL — HIGH (ref 2.5–4.5)
PLAT MORPH BLD: ABNORMAL
PLAT MORPH BLD: ABNORMAL
PLATELET # BLD AUTO: 118 K/UL — LOW (ref 150–400)
PLATELET # BLD AUTO: 139 K/UL — LOW (ref 150–400)
PLATELET # BLD AUTO: 146 K/UL — LOW (ref 150–400)
PLATELET # BLD AUTO: 148 K/UL — LOW (ref 150–400)
PLATELET # BLD AUTO: 150 K/UL — SIGNIFICANT CHANGE UP (ref 150–400)
PLATELET # BLD AUTO: 164 K/UL — SIGNIFICANT CHANGE UP (ref 150–400)
PLATELET # BLD AUTO: 176 K/UL — SIGNIFICANT CHANGE UP (ref 150–400)
PLATELET # BLD AUTO: 176 K/UL — SIGNIFICANT CHANGE UP (ref 150–400)
PLATELET # BLD AUTO: 185 K/UL — SIGNIFICANT CHANGE UP (ref 150–400)
PLATELET # BLD AUTO: 189 K/UL — SIGNIFICANT CHANGE UP (ref 150–400)
PLATELET # BLD AUTO: 193 K/UL — SIGNIFICANT CHANGE UP (ref 150–400)
PLATELET # BLD AUTO: 194 K/UL — SIGNIFICANT CHANGE UP (ref 150–400)
PLATELET # BLD AUTO: 206 K/UL — SIGNIFICANT CHANGE UP (ref 150–400)
PLATELET # BLD AUTO: 222 K/UL — SIGNIFICANT CHANGE UP (ref 150–400)
PLATELET # BLD AUTO: 255 K/UL — SIGNIFICANT CHANGE UP (ref 150–400)
PO2 BLDA: 180 MMHG — HIGH (ref 83–108)
POIKILOCYTOSIS BLD QL AUTO: SIGNIFICANT CHANGE UP
POIKILOCYTOSIS BLD QL AUTO: SLIGHT — SIGNIFICANT CHANGE UP
POLYCHROMASIA BLD QL SMEAR: SLIGHT — SIGNIFICANT CHANGE UP
POTASSIUM SERPL-MCNC: 3.7 MMOL/L — SIGNIFICANT CHANGE UP (ref 3.5–5.3)
POTASSIUM SERPL-MCNC: 3.7 MMOL/L — SIGNIFICANT CHANGE UP (ref 3.5–5.3)
POTASSIUM SERPL-MCNC: 3.8 MMOL/L — SIGNIFICANT CHANGE UP (ref 3.5–5.3)
POTASSIUM SERPL-MCNC: 4 MMOL/L — SIGNIFICANT CHANGE UP (ref 3.5–5.3)
POTASSIUM SERPL-MCNC: 4.1 MMOL/L — SIGNIFICANT CHANGE UP (ref 3.5–5.3)
POTASSIUM SERPL-MCNC: 4.2 MMOL/L — SIGNIFICANT CHANGE UP (ref 3.5–5.3)
POTASSIUM SERPL-MCNC: 4.3 MMOL/L — SIGNIFICANT CHANGE UP (ref 3.5–5.3)
POTASSIUM SERPL-MCNC: 4.4 MMOL/L — SIGNIFICANT CHANGE UP (ref 3.5–5.3)
POTASSIUM SERPL-MCNC: 4.5 MMOL/L — SIGNIFICANT CHANGE UP (ref 3.5–5.3)
POTASSIUM SERPL-MCNC: 4.6 MMOL/L — SIGNIFICANT CHANGE UP (ref 3.5–5.3)
POTASSIUM SERPL-MCNC: 5.3 MMOL/L — SIGNIFICANT CHANGE UP (ref 3.5–5.3)
POTASSIUM SERPL-MCNC: SIGNIFICANT CHANGE UP (ref 3.5–5.3)
POTASSIUM SERPL-SCNC: 3.7 MMOL/L — SIGNIFICANT CHANGE UP (ref 3.5–5.3)
POTASSIUM SERPL-SCNC: 3.7 MMOL/L — SIGNIFICANT CHANGE UP (ref 3.5–5.3)
POTASSIUM SERPL-SCNC: 3.8 MMOL/L — SIGNIFICANT CHANGE UP (ref 3.5–5.3)
POTASSIUM SERPL-SCNC: 4 MMOL/L — SIGNIFICANT CHANGE UP (ref 3.5–5.3)
POTASSIUM SERPL-SCNC: 4.1 MMOL/L — SIGNIFICANT CHANGE UP (ref 3.5–5.3)
POTASSIUM SERPL-SCNC: 4.2 MMOL/L — SIGNIFICANT CHANGE UP (ref 3.5–5.3)
POTASSIUM SERPL-SCNC: 4.3 MMOL/L — SIGNIFICANT CHANGE UP (ref 3.5–5.3)
POTASSIUM SERPL-SCNC: 4.4 MMOL/L — SIGNIFICANT CHANGE UP (ref 3.5–5.3)
POTASSIUM SERPL-SCNC: 4.5 MMOL/L — SIGNIFICANT CHANGE UP (ref 3.5–5.3)
POTASSIUM SERPL-SCNC: 4.6 MMOL/L — SIGNIFICANT CHANGE UP (ref 3.5–5.3)
POTASSIUM SERPL-SCNC: 5.3 MMOL/L — SIGNIFICANT CHANGE UP (ref 3.5–5.3)
POTASSIUM SERPL-SCNC: SIGNIFICANT CHANGE UP (ref 3.5–5.3)
PROT SERPL-MCNC: 5.4 G/DL — LOW (ref 6–8.3)
PROT SERPL-MCNC: 5.8 G/DL — LOW (ref 6–8.3)
PROT SERPL-MCNC: 6.1 G/DL — SIGNIFICANT CHANGE UP (ref 6–8.3)
PROT SERPL-MCNC: 6.2 G/DL — SIGNIFICANT CHANGE UP (ref 6–8.3)
PROT SERPL-MCNC: 6.3 G/DL — SIGNIFICANT CHANGE UP (ref 6–8.3)
PROT SERPL-MCNC: 6.3 G/DL — SIGNIFICANT CHANGE UP (ref 6–8.3)
PROT SERPL-MCNC: 6.5 G/DL — SIGNIFICANT CHANGE UP (ref 6–8.3)
PROT SERPL-MCNC: 6.6 G/DL — SIGNIFICANT CHANGE UP (ref 6–8.3)
PROT SERPL-MCNC: 6.7 G/DL — SIGNIFICANT CHANGE UP (ref 6–8.3)
PROT SERPL-MCNC: 7 G/DL — SIGNIFICANT CHANGE UP (ref 6–8.3)
PROT SERPL-MCNC: 7.2 G/DL — SIGNIFICANT CHANGE UP (ref 6–8.3)
PROT SERPL-MCNC: 7.9 G/DL — SIGNIFICANT CHANGE UP (ref 6–8.3)
PROTHROM AB SERPL-ACNC: 10.3 SEC — SIGNIFICANT CHANGE UP (ref 9.5–13)
RBC # BLD: 3.45 M/UL — LOW (ref 3.8–5.2)
RBC # BLD: 3.62 M/UL — LOW (ref 3.8–5.2)
RBC # BLD: 3.65 M/UL — LOW (ref 3.8–5.2)
RBC # BLD: 3.93 M/UL — SIGNIFICANT CHANGE UP (ref 3.8–5.2)
RBC # BLD: 3.99 M/UL — SIGNIFICANT CHANGE UP (ref 3.8–5.2)
RBC # BLD: 4.05 M/UL — SIGNIFICANT CHANGE UP (ref 3.8–5.2)
RBC # BLD: 4.12 M/UL — SIGNIFICANT CHANGE UP (ref 3.8–5.2)
RBC # BLD: 4.18 M/UL — SIGNIFICANT CHANGE UP (ref 3.8–5.2)
RBC # BLD: 4.18 M/UL — SIGNIFICANT CHANGE UP (ref 3.8–5.2)
RBC # BLD: 4.2 M/UL — SIGNIFICANT CHANGE UP (ref 3.8–5.2)
RBC # BLD: 4.26 M/UL — SIGNIFICANT CHANGE UP (ref 3.8–5.2)
RBC # BLD: 4.37 M/UL — SIGNIFICANT CHANGE UP (ref 3.8–5.2)
RBC # BLD: 4.44 M/UL — SIGNIFICANT CHANGE UP (ref 3.8–5.2)
RBC # BLD: 4.72 M/UL — SIGNIFICANT CHANGE UP (ref 3.8–5.2)
RBC # BLD: 4.97 M/UL — SIGNIFICANT CHANGE UP (ref 3.8–5.2)
RBC # FLD: 11.8 % — SIGNIFICANT CHANGE UP (ref 10.3–14.5)
RBC # FLD: 11.9 % — SIGNIFICANT CHANGE UP (ref 10.3–14.5)
RBC # FLD: 11.9 % — SIGNIFICANT CHANGE UP (ref 10.3–14.5)
RBC # FLD: 12 % — SIGNIFICANT CHANGE UP (ref 10.3–14.5)
RBC # FLD: 12 % — SIGNIFICANT CHANGE UP (ref 10.3–14.5)
RBC # FLD: 12.1 % — SIGNIFICANT CHANGE UP (ref 10.3–14.5)
RBC # FLD: 12.4 % — SIGNIFICANT CHANGE UP (ref 10.3–14.5)
RBC # FLD: 12.4 % — SIGNIFICANT CHANGE UP (ref 10.3–14.5)
RBC # FLD: 12.6 % — SIGNIFICANT CHANGE UP (ref 10.3–14.5)
RBC # FLD: 12.8 % — SIGNIFICANT CHANGE UP (ref 10.3–14.5)
RBC # FLD: 12.9 % — SIGNIFICANT CHANGE UP (ref 10.3–14.5)
RBC # FLD: 13 % — SIGNIFICANT CHANGE UP (ref 10.3–14.5)
RBC # FLD: 13 % — SIGNIFICANT CHANGE UP (ref 10.3–14.5)
RBC # FLD: 13.1 % — SIGNIFICANT CHANGE UP (ref 10.3–14.5)
RBC # FLD: 13.1 % — SIGNIFICANT CHANGE UP (ref 10.3–14.5)
RBC BLD AUTO: ABNORMAL
RBC BLD AUTO: ABNORMAL
RH IG SCN BLD-IMP: POSITIVE — SIGNIFICANT CHANGE UP
SAO2 % BLDA: 99.7 % — HIGH (ref 94–98)
SMUDGE CELLS # BLD: PRESENT — SIGNIFICANT CHANGE UP
SMUDGE CELLS # BLD: PRESENT — SIGNIFICANT CHANGE UP
SODIUM SERPL-SCNC: 131 MMOL/L — LOW (ref 135–145)
SODIUM SERPL-SCNC: 135 MMOL/L — SIGNIFICANT CHANGE UP (ref 135–145)
SODIUM SERPL-SCNC: 136 MMOL/L — SIGNIFICANT CHANGE UP (ref 135–145)
SODIUM SERPL-SCNC: 136 MMOL/L — SIGNIFICANT CHANGE UP (ref 135–145)
SODIUM SERPL-SCNC: 138 MMOL/L — SIGNIFICANT CHANGE UP (ref 135–145)
SODIUM SERPL-SCNC: 138 MMOL/L — SIGNIFICANT CHANGE UP (ref 135–145)
SODIUM SERPL-SCNC: 140 MMOL/L — SIGNIFICANT CHANGE UP (ref 135–145)
SODIUM SERPL-SCNC: 142 MMOL/L — SIGNIFICANT CHANGE UP (ref 135–145)
SODIUM SERPL-SCNC: 142 MMOL/L — SIGNIFICANT CHANGE UP (ref 135–145)
SODIUM SERPL-SCNC: 144 MMOL/L — SIGNIFICANT CHANGE UP (ref 135–145)
SODIUM SERPL-SCNC: 145 MMOL/L — SIGNIFICANT CHANGE UP (ref 135–145)
SODIUM SERPL-SCNC: 147 MMOL/L — HIGH (ref 135–145)
SODIUM SERPL-SCNC: 147 MMOL/L — HIGH (ref 135–145)
SODIUM SERPL-SCNC: 149 MMOL/L — HIGH (ref 135–145)
SODIUM SERPL-SCNC: 150 MMOL/L — HIGH (ref 135–145)
SODIUM SERPL-SCNC: 151 MMOL/L — HIGH (ref 135–145)
SODIUM SERPL-SCNC: 152 MMOL/L — HIGH (ref 135–145)
SODIUM SERPL-SCNC: 153 MMOL/L — HIGH (ref 135–145)
SODIUM SERPL-SCNC: 154 MMOL/L — HIGH (ref 135–145)
SODIUM SERPL-SCNC: 154 MMOL/L — HIGH (ref 135–145)
SODIUM SERPL-SCNC: 155 MMOL/L — HIGH (ref 135–145)
SODIUM SERPL-SCNC: 156 MMOL/L — HIGH (ref 135–145)
SODIUM SERPL-SCNC: 157 MMOL/L — HIGH (ref 135–145)
SODIUM SERPL-SCNC: 157 MMOL/L — HIGH (ref 135–145)
SPECIMEN SOURCE: SIGNIFICANT CHANGE UP
SPECIMEN SOURCE: SIGNIFICANT CHANGE UP
SPHEROCYTES BLD QL SMEAR: SLIGHT — SIGNIFICANT CHANGE UP
TRIGL SERPL-MCNC: 152 MG/DL — HIGH
TROPONIN T, HIGH SENSITIVITY RESULT: 13 NG/L — SIGNIFICANT CHANGE UP (ref 0–51)
TSH SERPL-MCNC: 0.42 UIU/ML — SIGNIFICANT CHANGE UP (ref 0.27–4.2)
WBC # BLD: 10.24 K/UL — SIGNIFICANT CHANGE UP (ref 3.8–10.5)
WBC # BLD: 10.78 K/UL — HIGH (ref 3.8–10.5)
WBC # BLD: 11.07 K/UL — HIGH (ref 3.8–10.5)
WBC # BLD: 12.28 K/UL — HIGH (ref 3.8–10.5)
WBC # BLD: 12.37 K/UL — HIGH (ref 3.8–10.5)
WBC # BLD: 12.92 K/UL — HIGH (ref 3.8–10.5)
WBC # BLD: 14.52 K/UL — HIGH (ref 3.8–10.5)
WBC # BLD: 14.74 K/UL — HIGH (ref 3.8–10.5)
WBC # BLD: 15.07 K/UL — HIGH (ref 3.8–10.5)
WBC # BLD: 15.16 K/UL — HIGH (ref 3.8–10.5)
WBC # BLD: 16.08 K/UL — HIGH (ref 3.8–10.5)
WBC # BLD: 9.51 K/UL — SIGNIFICANT CHANGE UP (ref 3.8–10.5)
WBC # BLD: 9.71 K/UL — SIGNIFICANT CHANGE UP (ref 3.8–10.5)
WBC # BLD: 9.99 K/UL — SIGNIFICANT CHANGE UP (ref 3.8–10.5)
WBC # BLD: 9.99 K/UL — SIGNIFICANT CHANGE UP (ref 3.8–10.5)
WBC # FLD AUTO: 10.24 K/UL — SIGNIFICANT CHANGE UP (ref 3.8–10.5)
WBC # FLD AUTO: 10.78 K/UL — HIGH (ref 3.8–10.5)
WBC # FLD AUTO: 11.07 K/UL — HIGH (ref 3.8–10.5)
WBC # FLD AUTO: 12.28 K/UL — HIGH (ref 3.8–10.5)
WBC # FLD AUTO: 12.37 K/UL — HIGH (ref 3.8–10.5)
WBC # FLD AUTO: 12.92 K/UL — HIGH (ref 3.8–10.5)
WBC # FLD AUTO: 14.52 K/UL — HIGH (ref 3.8–10.5)
WBC # FLD AUTO: 14.74 K/UL — HIGH (ref 3.8–10.5)
WBC # FLD AUTO: 15.07 K/UL — HIGH (ref 3.8–10.5)
WBC # FLD AUTO: 15.16 K/UL — HIGH (ref 3.8–10.5)
WBC # FLD AUTO: 16.08 K/UL — HIGH (ref 3.8–10.5)
WBC # FLD AUTO: 9.51 K/UL — SIGNIFICANT CHANGE UP (ref 3.8–10.5)
WBC # FLD AUTO: 9.71 K/UL — SIGNIFICANT CHANGE UP (ref 3.8–10.5)
WBC # FLD AUTO: 9.99 K/UL — SIGNIFICANT CHANGE UP (ref 3.8–10.5)
WBC # FLD AUTO: 9.99 K/UL — SIGNIFICANT CHANGE UP (ref 3.8–10.5)

## 2023-01-01 PROCEDURE — 99232 SBSQ HOSP IP/OBS MODERATE 35: CPT | Mod: GC

## 2023-01-01 PROCEDURE — 99233 SBSQ HOSP IP/OBS HIGH 50: CPT

## 2023-01-01 PROCEDURE — 82962 GLUCOSE BLOOD TEST: CPT

## 2023-01-01 PROCEDURE — 86850 RBC ANTIBODY SCREEN: CPT

## 2023-01-01 PROCEDURE — 0042T: CPT | Mod: MA

## 2023-01-01 PROCEDURE — 85025 COMPLETE CBC W/AUTO DIFF WBC: CPT

## 2023-01-01 PROCEDURE — 99232 SBSQ HOSP IP/OBS MODERATE 35: CPT

## 2023-01-01 PROCEDURE — 99231 SBSQ HOSP IP/OBS SF/LOW 25: CPT

## 2023-01-01 PROCEDURE — 99284 EMERGENCY DEPT VISIT MOD MDM: CPT

## 2023-01-01 PROCEDURE — 99223 1ST HOSP IP/OBS HIGH 75: CPT

## 2023-01-01 PROCEDURE — 95720 EEG PHY/QHP EA INCR W/VEEG: CPT

## 2023-01-01 PROCEDURE — 70450 CT HEAD/BRAIN W/O DYE: CPT | Mod: 26

## 2023-01-01 PROCEDURE — 71045 X-RAY EXAM CHEST 1 VIEW: CPT | Mod: 26

## 2023-01-01 PROCEDURE — 99222 1ST HOSP IP/OBS MODERATE 55: CPT

## 2023-01-01 PROCEDURE — 70498 CT ANGIOGRAPHY NECK: CPT | Mod: MA

## 2023-01-01 PROCEDURE — 86900 BLOOD TYPING SEROLOGIC ABO: CPT

## 2023-01-01 PROCEDURE — 85027 COMPLETE CBC AUTOMATED: CPT

## 2023-01-01 PROCEDURE — 80061 LIPID PANEL: CPT

## 2023-01-01 PROCEDURE — 70450 CT HEAD/BRAIN W/O DYE: CPT

## 2023-01-01 PROCEDURE — 95718 EEG PHYS/QHP 2-12 HR W/VEEG: CPT

## 2023-01-01 PROCEDURE — 99285 EMERGENCY DEPT VISIT HI MDM: CPT

## 2023-01-01 PROCEDURE — 84443 ASSAY THYROID STIM HORMONE: CPT

## 2023-01-01 PROCEDURE — 71250 CT THORAX DX C-: CPT | Mod: MA

## 2023-01-01 PROCEDURE — 71250 CT THORAX DX C-: CPT | Mod: 26,MA

## 2023-01-01 PROCEDURE — 99291 CRITICAL CARE FIRST HOUR: CPT

## 2023-01-01 PROCEDURE — 70450 CT HEAD/BRAIN W/O DYE: CPT | Mod: 26,MA,XU

## 2023-01-01 PROCEDURE — 99498 ADVNCD CARE PLAN ADDL 30 MIN: CPT | Mod: 25

## 2023-01-01 PROCEDURE — 84100 ASSAY OF PHOSPHORUS: CPT

## 2023-01-01 PROCEDURE — 99292 CRITICAL CARE ADDL 30 MIN: CPT | Mod: GC

## 2023-01-01 PROCEDURE — 93010 ELECTROCARDIOGRAM REPORT: CPT

## 2023-01-01 PROCEDURE — 80053 COMPREHEN METABOLIC PANEL: CPT

## 2023-01-01 PROCEDURE — 93306 TTE W/DOPPLER COMPLETE: CPT | Mod: 26

## 2023-01-01 PROCEDURE — 93306 TTE W/DOPPLER COMPLETE: CPT

## 2023-01-01 PROCEDURE — 86901 BLOOD TYPING SEROLOGIC RH(D): CPT

## 2023-01-01 PROCEDURE — 83605 ASSAY OF LACTIC ACID: CPT

## 2023-01-01 PROCEDURE — 83735 ASSAY OF MAGNESIUM: CPT

## 2023-01-01 PROCEDURE — 70498 CT ANGIOGRAPHY NECK: CPT | Mod: 26,MA

## 2023-01-01 PROCEDURE — 94003 VENT MGMT INPAT SUBQ DAY: CPT

## 2023-01-01 PROCEDURE — 83930 ASSAY OF BLOOD OSMOLALITY: CPT

## 2023-01-01 PROCEDURE — 95705 EEG W/O VID 2-12 HR UNMNTR: CPT

## 2023-01-01 PROCEDURE — 70496 CT ANGIOGRAPHY HEAD: CPT | Mod: MA

## 2023-01-01 PROCEDURE — 99358 PROLONG SERVICE W/O CONTACT: CPT

## 2023-01-01 PROCEDURE — 83036 HEMOGLOBIN GLYCOSYLATED A1C: CPT

## 2023-01-01 PROCEDURE — 99497 ADVNCD CARE PLAN 30 MIN: CPT | Mod: 25

## 2023-01-01 PROCEDURE — 80048 BASIC METABOLIC PNL TOTAL CA: CPT

## 2023-01-01 PROCEDURE — 87186 SC STD MICRODIL/AGAR DIL: CPT

## 2023-01-01 PROCEDURE — 84484 ASSAY OF TROPONIN QUANT: CPT

## 2023-01-01 PROCEDURE — 93880 EXTRACRANIAL BILAT STUDY: CPT | Mod: 26

## 2023-01-01 PROCEDURE — 82803 BLOOD GASES ANY COMBINATION: CPT

## 2023-01-01 PROCEDURE — 87070 CULTURE OTHR SPECIMN AEROBIC: CPT

## 2023-01-01 PROCEDURE — 93005 ELECTROCARDIOGRAM TRACING: CPT

## 2023-01-01 PROCEDURE — 36415 COLL VENOUS BLD VENIPUNCTURE: CPT

## 2023-01-01 PROCEDURE — 95700 EEG CONT REC W/VID EEG TECH: CPT

## 2023-01-01 PROCEDURE — 85610 PROTHROMBIN TIME: CPT

## 2023-01-01 PROCEDURE — 70496 CT ANGIOGRAPHY HEAD: CPT | Mod: 26,MA

## 2023-01-01 PROCEDURE — 99233 SBSQ HOSP IP/OBS HIGH 50: CPT | Mod: GC

## 2023-01-01 PROCEDURE — 93880 EXTRACRANIAL BILAT STUDY: CPT

## 2023-01-01 PROCEDURE — 85730 THROMBOPLASTIN TIME PARTIAL: CPT

## 2023-01-01 PROCEDURE — 95708 EEG WO VID EA 12-26HR UNMNTR: CPT

## 2023-01-01 PROCEDURE — 99285 EMERGENCY DEPT VISIT HI MDM: CPT | Mod: 25

## 2023-01-01 PROCEDURE — 71045 X-RAY EXAM CHEST 1 VIEW: CPT

## 2023-01-01 RX ORDER — DEXTROSE 50 % IN WATER 50 %
12.5 SYRINGE (ML) INTRAVENOUS ONCE
Refills: 0 | Status: DISCONTINUED | OUTPATIENT
Start: 2023-01-01 | End: 2023-01-01

## 2023-01-01 RX ORDER — INSULIN LISPRO 100/ML
VIAL (ML) SUBCUTANEOUS
Refills: 0 | Status: DISCONTINUED | OUTPATIENT
Start: 2023-01-01 | End: 2023-01-01

## 2023-01-01 RX ORDER — SODIUM CHLORIDE 5 G/100ML
100 INJECTION, SOLUTION INTRAVENOUS ONCE
Refills: 0 | Status: COMPLETED | OUTPATIENT
Start: 2023-01-01 | End: 2023-01-01

## 2023-01-01 RX ORDER — LEVETIRACETAM 250 MG/1
250 TABLET, FILM COATED ORAL EVERY 12 HOURS
Refills: 0 | Status: DISCONTINUED | OUTPATIENT
Start: 2023-01-01 | End: 2023-01-01

## 2023-01-01 RX ORDER — SODIUM CHLORIDE 5 G/100ML
500 INJECTION, SOLUTION INTRAVENOUS
Refills: 0 | Status: DISCONTINUED | OUTPATIENT
Start: 2023-01-01 | End: 2023-01-01

## 2023-01-01 RX ORDER — ACETAMINOPHEN 500 MG
1000 TABLET ORAL ONCE
Refills: 0 | Status: COMPLETED | OUTPATIENT
Start: 2023-01-01 | End: 2023-01-01

## 2023-01-01 RX ORDER — POTASSIUM PHOSPHATE, MONOBASIC POTASSIUM PHOSPHATE, DIBASIC 236; 224 MG/ML; MG/ML
15 INJECTION, SOLUTION INTRAVENOUS ONCE
Refills: 0 | Status: COMPLETED | OUTPATIENT
Start: 2023-01-01 | End: 2023-01-01

## 2023-01-01 RX ORDER — DEXTROSE 50 % IN WATER 50 %
15 SYRINGE (ML) INTRAVENOUS ONCE
Refills: 0 | Status: DISCONTINUED | OUTPATIENT
Start: 2023-01-01 | End: 2023-01-01

## 2023-01-01 RX ORDER — MANNITOL
50 POWDER (GRAM) MISCELLANEOUS ONCE
Refills: 0 | Status: COMPLETED | OUTPATIENT
Start: 2023-01-01 | End: 2023-01-01

## 2023-01-01 RX ORDER — PANTOPRAZOLE SODIUM 20 MG/1
40 TABLET, DELAYED RELEASE ORAL DAILY
Refills: 0 | Status: DISCONTINUED | OUTPATIENT
Start: 2023-01-01 | End: 2023-01-01

## 2023-01-01 RX ORDER — SODIUM CHLORIDE 9 MG/ML
1000 INJECTION, SOLUTION INTRAVENOUS
Refills: 0 | Status: DISCONTINUED | OUTPATIENT
Start: 2023-01-01 | End: 2023-01-01

## 2023-01-01 RX ORDER — ROBINUL 0.2 MG/ML
0.4 INJECTION INTRAMUSCULAR; INTRAVENOUS EVERY 6 HOURS
Refills: 0 | Status: DISCONTINUED | OUTPATIENT
Start: 2023-01-01 | End: 2023-01-01

## 2023-01-01 RX ORDER — DILTIAZEM HCL 120 MG
10 CAPSULE, EXT RELEASE 24 HR ORAL ONCE
Refills: 0 | Status: COMPLETED | OUTPATIENT
Start: 2023-01-01 | End: 2023-01-01

## 2023-01-01 RX ORDER — HYDROMORPHONE HYDROCHLORIDE 2 MG/ML
0.5 INJECTION INTRAMUSCULAR; INTRAVENOUS; SUBCUTANEOUS EVERY 6 HOURS
Refills: 0 | Status: DISCONTINUED | OUTPATIENT
Start: 2023-01-01 | End: 2023-01-01

## 2023-01-01 RX ORDER — CEFTRIAXONE 500 MG/1
2000 INJECTION, POWDER, FOR SOLUTION INTRAMUSCULAR; INTRAVENOUS EVERY 24 HOURS
Refills: 0 | Status: DISCONTINUED | OUTPATIENT
Start: 2023-01-01 | End: 2023-01-01

## 2023-01-01 RX ORDER — DEXTROSE 50 % IN WATER 50 %
25 SYRINGE (ML) INTRAVENOUS ONCE
Refills: 0 | Status: DISCONTINUED | OUTPATIENT
Start: 2023-01-01 | End: 2023-01-01

## 2023-01-01 RX ORDER — PROPOFOL 10 MG/ML
10 INJECTION, EMULSION INTRAVENOUS
Qty: 1000 | Refills: 0 | Status: DISCONTINUED | OUTPATIENT
Start: 2023-01-01 | End: 2023-01-01

## 2023-01-01 RX ORDER — SCOPALAMINE 1 MG/3D
1 PATCH, EXTENDED RELEASE TRANSDERMAL
Refills: 0 | Status: DISCONTINUED | OUTPATIENT
Start: 2023-01-01 | End: 2023-01-01

## 2023-01-01 RX ORDER — CEFTRIAXONE 500 MG/1
1000 INJECTION, POWDER, FOR SOLUTION INTRAMUSCULAR; INTRAVENOUS EVERY 24 HOURS
Refills: 0 | Status: DISCONTINUED | OUTPATIENT
Start: 2023-01-01 | End: 2023-01-01

## 2023-01-01 RX ORDER — HYDROMORPHONE HYDROCHLORIDE 2 MG/ML
0.5 INJECTION INTRAMUSCULAR; INTRAVENOUS; SUBCUTANEOUS
Refills: 0 | Status: DISCONTINUED | OUTPATIENT
Start: 2023-01-01 | End: 2023-01-01

## 2023-01-01 RX ORDER — ERTAPENEM SODIUM 1 G/1
1000 INJECTION, POWDER, LYOPHILIZED, FOR SOLUTION INTRAMUSCULAR; INTRAVENOUS ONCE
Refills: 0 | Status: COMPLETED | OUTPATIENT
Start: 2023-01-01 | End: 2023-01-01

## 2023-01-01 RX ORDER — ACETAMINOPHEN 500 MG
650 TABLET ORAL ONCE
Refills: 0 | Status: COMPLETED | OUTPATIENT
Start: 2023-01-01 | End: 2023-01-01

## 2023-01-01 RX ORDER — DILTIAZEM HCL 120 MG
10 CAPSULE, EXT RELEASE 24 HR ORAL ONCE
Refills: 0 | Status: DISCONTINUED | OUTPATIENT
Start: 2023-01-01 | End: 2023-01-01

## 2023-01-01 RX ORDER — ONDANSETRON 8 MG/1
4 TABLET, FILM COATED ORAL EVERY 6 HOURS
Refills: 0 | Status: DISCONTINUED | OUTPATIENT
Start: 2023-01-01 | End: 2023-01-01

## 2023-01-01 RX ORDER — ENOXAPARIN SODIUM 100 MG/ML
40 INJECTION SUBCUTANEOUS EVERY 24 HOURS
Refills: 0 | Status: DISCONTINUED | OUTPATIENT
Start: 2023-01-01 | End: 2023-01-01

## 2023-01-01 RX ORDER — ATORVASTATIN CALCIUM 80 MG/1
80 TABLET, FILM COATED ORAL AT BEDTIME
Refills: 0 | Status: DISCONTINUED | OUTPATIENT
Start: 2023-01-01 | End: 2023-01-01

## 2023-01-01 RX ORDER — METOPROLOL TARTRATE 50 MG
5 TABLET ORAL ONCE
Refills: 0 | Status: COMPLETED | OUTPATIENT
Start: 2023-01-01 | End: 2023-01-01

## 2023-01-01 RX ORDER — ACETAMINOPHEN 500 MG
650 TABLET ORAL EVERY 6 HOURS
Refills: 0 | Status: DISCONTINUED | OUTPATIENT
Start: 2023-01-01 | End: 2023-01-01

## 2023-01-01 RX ORDER — HYDROMORPHONE HYDROCHLORIDE 2 MG/ML
0.2 INJECTION INTRAMUSCULAR; INTRAVENOUS; SUBCUTANEOUS
Qty: 100 | Refills: 0 | Status: DISCONTINUED | OUTPATIENT
Start: 2023-01-01 | End: 2023-01-01

## 2023-01-01 RX ORDER — ERTAPENEM SODIUM 1 G/1
1000 INJECTION, POWDER, LYOPHILIZED, FOR SOLUTION INTRAMUSCULAR; INTRAVENOUS EVERY 24 HOURS
Refills: 0 | Status: COMPLETED | OUTPATIENT
Start: 2023-01-01 | End: 2023-01-01

## 2023-01-01 RX ORDER — SODIUM CHLORIDE 9 MG/ML
250 INJECTION INTRAMUSCULAR; INTRAVENOUS; SUBCUTANEOUS ONCE
Refills: 0 | Status: COMPLETED | OUTPATIENT
Start: 2023-01-01 | End: 2023-01-01

## 2023-01-01 RX ORDER — GLUCAGON INJECTION, SOLUTION 0.5 MG/.1ML
1 INJECTION, SOLUTION SUBCUTANEOUS ONCE
Refills: 0 | Status: DISCONTINUED | OUTPATIENT
Start: 2023-01-01 | End: 2023-01-01

## 2023-01-01 RX ORDER — ASPIRIN/CALCIUM CARB/MAGNESIUM 324 MG
1 TABLET ORAL
Qty: 0 | Refills: 0 | DISCHARGE
Start: 2023-01-01

## 2023-01-01 RX ORDER — POTASSIUM CHLORIDE 20 MEQ
10 PACKET (EA) ORAL
Refills: 0 | Status: COMPLETED | OUTPATIENT
Start: 2023-01-01 | End: 2023-01-01

## 2023-01-01 RX ORDER — MANNITOL
50 POWDER (GRAM) MISCELLANEOUS EVERY 6 HOURS
Refills: 0 | Status: DISCONTINUED | OUTPATIENT
Start: 2023-01-01 | End: 2023-01-01

## 2023-01-01 RX ORDER — LEVETIRACETAM 250 MG/1
500 TABLET, FILM COATED ORAL EVERY 12 HOURS
Refills: 0 | Status: DISCONTINUED | OUTPATIENT
Start: 2023-01-01 | End: 2023-01-01

## 2023-01-01 RX ORDER — METOPROLOL TARTRATE 50 MG
12.5 TABLET ORAL
Refills: 0 | Status: DISCONTINUED | OUTPATIENT
Start: 2023-01-01 | End: 2023-01-01

## 2023-01-01 RX ORDER — MANNITOL
50 POWDER (GRAM) MISCELLANEOUS EVERY 12 HOURS
Refills: 0 | Status: DISCONTINUED | OUTPATIENT
Start: 2023-01-01 | End: 2023-01-01

## 2023-01-01 RX ORDER — METOPROLOL TARTRATE 50 MG
12.5 TABLET ORAL
Qty: 0 | Refills: 0 | DISCHARGE
Start: 2023-01-01

## 2023-01-01 RX ORDER — ROBINUL 0.2 MG/ML
0.4 INJECTION INTRAMUSCULAR; INTRAVENOUS EVERY 4 HOURS
Refills: 0 | Status: DISCONTINUED | OUTPATIENT
Start: 2023-01-01 | End: 2023-01-01

## 2023-01-01 RX ORDER — NICARDIPINE HYDROCHLORIDE 30 MG/1
5 CAPSULE, EXTENDED RELEASE ORAL
Qty: 40 | Refills: 0 | Status: DISCONTINUED | OUTPATIENT
Start: 2023-01-01 | End: 2023-01-01

## 2023-01-01 RX ORDER — CHLORHEXIDINE GLUCONATE 213 G/1000ML
15 SOLUTION TOPICAL EVERY 12 HOURS
Refills: 0 | Status: DISCONTINUED | OUTPATIENT
Start: 2023-01-01 | End: 2023-01-01

## 2023-01-01 RX ORDER — CHLORHEXIDINE GLUCONATE 213 G/1000ML
1 SOLUTION TOPICAL
Refills: 0 | Status: DISCONTINUED | OUTPATIENT
Start: 2023-01-01 | End: 2023-01-01

## 2023-01-01 RX ORDER — DILTIAZEM HCL 120 MG
5 CAPSULE, EXT RELEASE 24 HR ORAL ONCE
Refills: 0 | Status: COMPLETED | OUTPATIENT
Start: 2023-01-01 | End: 2023-01-01

## 2023-01-01 RX ORDER — HYDROMORPHONE HYDROCHLORIDE 2 MG/ML
0.2 INJECTION INTRAMUSCULAR; INTRAVENOUS; SUBCUTANEOUS
Qty: 10 | Refills: 0 | Status: DISCONTINUED | OUTPATIENT
Start: 2023-01-01 | End: 2023-01-01

## 2023-01-01 RX ORDER — ROBINUL 0.2 MG/ML
0.4 INJECTION INTRAMUSCULAR; INTRAVENOUS
Refills: 0 | Status: DISCONTINUED | OUTPATIENT
Start: 2023-01-01 | End: 2023-01-01

## 2023-01-01 RX ORDER — ASPIRIN/CALCIUM CARB/MAGNESIUM 324 MG
81 TABLET ORAL EVERY 24 HOURS
Refills: 0 | Status: DISCONTINUED | OUTPATIENT
Start: 2023-01-01 | End: 2023-01-01

## 2023-01-01 RX ORDER — METOPROLOL TARTRATE 50 MG
12.5 TABLET ORAL EVERY 12 HOURS
Refills: 0 | Status: DISCONTINUED | OUTPATIENT
Start: 2023-01-01 | End: 2023-01-01

## 2023-01-01 RX ADMIN — SCOPALAMINE 1 PATCH: 1 PATCH, EXTENDED RELEASE TRANSDERMAL at 18:28

## 2023-01-01 RX ADMIN — ATORVASTATIN CALCIUM 80 MILLIGRAM(S): 80 TABLET, FILM COATED ORAL at 22:23

## 2023-01-01 RX ADMIN — Medication 1: at 12:03

## 2023-01-01 RX ADMIN — CHLORHEXIDINE GLUCONATE 15 MILLILITER(S): 213 SOLUTION TOPICAL at 17:37

## 2023-01-01 RX ADMIN — Medication 1 DROP(S): at 12:20

## 2023-01-01 RX ADMIN — CHLORHEXIDINE GLUCONATE 15 MILLILITER(S): 213 SOLUTION TOPICAL at 05:41

## 2023-01-01 RX ADMIN — Medication 2: at 17:54

## 2023-01-01 RX ADMIN — Medication 1000 MILLIGRAM(S): at 22:32

## 2023-01-01 RX ADMIN — Medication 500 GRAM(S): at 16:27

## 2023-01-01 RX ADMIN — Medication 12.5 MILLIGRAM(S): at 20:30

## 2023-01-01 RX ADMIN — HYDROMORPHONE HYDROCHLORIDE 0.5 MILLIGRAM(S): 2 INJECTION INTRAMUSCULAR; INTRAVENOUS; SUBCUTANEOUS at 11:37

## 2023-01-01 RX ADMIN — Medication 2: at 11:36

## 2023-01-01 RX ADMIN — SODIUM CHLORIDE 30 MILLILITER(S): 9 INJECTION, SOLUTION INTRAVENOUS at 20:44

## 2023-01-01 RX ADMIN — ENOXAPARIN SODIUM 40 MILLIGRAM(S): 100 INJECTION SUBCUTANEOUS at 12:40

## 2023-01-01 RX ADMIN — HYDROMORPHONE HYDROCHLORIDE 0.5 MILLIGRAM(S): 2 INJECTION INTRAMUSCULAR; INTRAVENOUS; SUBCUTANEOUS at 06:09

## 2023-01-01 RX ADMIN — Medication 81 MILLIGRAM(S): at 11:14

## 2023-01-01 RX ADMIN — ROBINUL 0.4 MILLIGRAM(S): 0.2 INJECTION INTRAMUSCULAR; INTRAVENOUS at 06:54

## 2023-01-01 RX ADMIN — Medication 3: at 00:43

## 2023-01-01 RX ADMIN — HYDROMORPHONE HYDROCHLORIDE 0.5 MILLIGRAM(S): 2 INJECTION INTRAMUSCULAR; INTRAVENOUS; SUBCUTANEOUS at 00:02

## 2023-01-01 RX ADMIN — CHLORHEXIDINE GLUCONATE 15 MILLILITER(S): 213 SOLUTION TOPICAL at 05:34

## 2023-01-01 RX ADMIN — ATORVASTATIN CALCIUM 80 MILLIGRAM(S): 80 TABLET, FILM COATED ORAL at 22:27

## 2023-01-01 RX ADMIN — ROBINUL 0.4 MILLIGRAM(S): 0.2 INJECTION INTRAMUSCULAR; INTRAVENOUS at 11:36

## 2023-01-01 RX ADMIN — Medication 1: at 18:44

## 2023-01-01 RX ADMIN — HYDROMORPHONE HYDROCHLORIDE 0.5 MILLIGRAM(S): 2 INJECTION INTRAMUSCULAR; INTRAVENOUS; SUBCUTANEOUS at 14:24

## 2023-01-01 RX ADMIN — CHLORHEXIDINE GLUCONATE 1 APPLICATION(S): 213 SOLUTION TOPICAL at 05:36

## 2023-01-01 RX ADMIN — Medication 12.5 MILLIGRAM(S): at 18:11

## 2023-01-01 RX ADMIN — Medication 650 MILLIGRAM(S): at 23:38

## 2023-01-01 RX ADMIN — Medication 100 MILLIEQUIVALENT(S): at 05:50

## 2023-01-01 RX ADMIN — HYDROMORPHONE HYDROCHLORIDE 0.5 MILLIGRAM(S): 2 INJECTION INTRAMUSCULAR; INTRAVENOUS; SUBCUTANEOUS at 11:02

## 2023-01-01 RX ADMIN — Medication 2: at 06:19

## 2023-01-01 RX ADMIN — CHLORHEXIDINE GLUCONATE 15 MILLILITER(S): 213 SOLUTION TOPICAL at 07:09

## 2023-01-01 RX ADMIN — SCOPALAMINE 1 PATCH: 1 PATCH, EXTENDED RELEASE TRANSDERMAL at 18:18

## 2023-01-01 RX ADMIN — CEFTRIAXONE 100 MILLIGRAM(S): 500 INJECTION, POWDER, FOR SOLUTION INTRAMUSCULAR; INTRAVENOUS at 12:51

## 2023-01-01 RX ADMIN — ROBINUL 0.4 MILLIGRAM(S): 0.2 INJECTION INTRAMUSCULAR; INTRAVENOUS at 00:12

## 2023-01-01 RX ADMIN — Medication 2: at 12:24

## 2023-01-01 RX ADMIN — Medication 2: at 00:15

## 2023-01-01 RX ADMIN — Medication 650 MILLIGRAM(S): at 18:11

## 2023-01-01 RX ADMIN — ROBINUL 0.4 MILLIGRAM(S): 0.2 INJECTION INTRAMUSCULAR; INTRAVENOUS at 12:08

## 2023-01-01 RX ADMIN — Medication 2: at 18:10

## 2023-01-01 RX ADMIN — Medication 1 DROP(S): at 11:22

## 2023-01-01 RX ADMIN — HYDROMORPHONE HYDROCHLORIDE 0.5 MILLIGRAM(S): 2 INJECTION INTRAMUSCULAR; INTRAVENOUS; SUBCUTANEOUS at 17:00

## 2023-01-01 RX ADMIN — Medication 400 MILLIGRAM(S): at 12:53

## 2023-01-01 RX ADMIN — CHLORHEXIDINE GLUCONATE 15 MILLILITER(S): 213 SOLUTION TOPICAL at 17:42

## 2023-01-01 RX ADMIN — CHLORHEXIDINE GLUCONATE 1 APPLICATION(S): 213 SOLUTION TOPICAL at 07:08

## 2023-01-01 RX ADMIN — LEVETIRACETAM 400 MILLIGRAM(S): 250 TABLET, FILM COATED ORAL at 17:31

## 2023-01-01 RX ADMIN — Medication 1000 MILLIGRAM(S): at 22:52

## 2023-01-01 RX ADMIN — HYDROMORPHONE HYDROCHLORIDE 0.5 MILLIGRAM(S): 2 INJECTION INTRAMUSCULAR; INTRAVENOUS; SUBCUTANEOUS at 22:41

## 2023-01-01 RX ADMIN — CHLORHEXIDINE GLUCONATE 1 APPLICATION(S): 213 SOLUTION TOPICAL at 05:11

## 2023-01-01 RX ADMIN — HYDROMORPHONE HYDROCHLORIDE 0.5 MILLIGRAM(S): 2 INJECTION INTRAMUSCULAR; INTRAVENOUS; SUBCUTANEOUS at 01:00

## 2023-01-01 RX ADMIN — ROBINUL 0.4 MILLIGRAM(S): 0.2 INJECTION INTRAMUSCULAR; INTRAVENOUS at 09:44

## 2023-01-01 RX ADMIN — Medication 2: at 23:10

## 2023-01-01 RX ADMIN — CHLORHEXIDINE GLUCONATE 15 MILLILITER(S): 213 SOLUTION TOPICAL at 17:25

## 2023-01-01 RX ADMIN — Medication 100 MILLIEQUIVALENT(S): at 07:53

## 2023-01-01 RX ADMIN — Medication 1 DROP(S): at 11:14

## 2023-01-01 RX ADMIN — LEVETIRACETAM 400 MILLIGRAM(S): 250 TABLET, FILM COATED ORAL at 22:27

## 2023-01-01 RX ADMIN — Medication 500 GRAM(S): at 23:11

## 2023-01-01 RX ADMIN — ATORVASTATIN CALCIUM 80 MILLIGRAM(S): 80 TABLET, FILM COATED ORAL at 22:57

## 2023-01-01 RX ADMIN — Medication 1: at 11:46

## 2023-01-01 RX ADMIN — CHLORHEXIDINE GLUCONATE 15 MILLILITER(S): 213 SOLUTION TOPICAL at 18:06

## 2023-01-01 RX ADMIN — Medication 1 DROP(S): at 11:41

## 2023-01-01 RX ADMIN — Medication 1000 MILLIGRAM(S): at 14:13

## 2023-01-01 RX ADMIN — CHLORHEXIDINE GLUCONATE 15 MILLILITER(S): 213 SOLUTION TOPICAL at 06:15

## 2023-01-01 RX ADMIN — HYDROMORPHONE HYDROCHLORIDE 0.5 MILLIGRAM(S): 2 INJECTION INTRAMUSCULAR; INTRAVENOUS; SUBCUTANEOUS at 06:25

## 2023-01-01 RX ADMIN — Medication 1: at 23:39

## 2023-01-01 RX ADMIN — Medication 1000 MILLIGRAM(S): at 01:02

## 2023-01-01 RX ADMIN — SCOPALAMINE 1 PATCH: 1 PATCH, EXTENDED RELEASE TRANSDERMAL at 08:30

## 2023-01-01 RX ADMIN — SODIUM CHLORIDE 25 MILLILITER(S): 5 INJECTION, SOLUTION INTRAVENOUS at 17:22

## 2023-01-01 RX ADMIN — ATORVASTATIN CALCIUM 80 MILLIGRAM(S): 80 TABLET, FILM COATED ORAL at 21:54

## 2023-01-01 RX ADMIN — ERTAPENEM SODIUM 120 MILLIGRAM(S): 1 INJECTION, POWDER, LYOPHILIZED, FOR SOLUTION INTRAMUSCULAR; INTRAVENOUS at 14:59

## 2023-01-01 RX ADMIN — HYDROMORPHONE HYDROCHLORIDE 0.5 MILLIGRAM(S): 2 INJECTION INTRAMUSCULAR; INTRAVENOUS; SUBCUTANEOUS at 06:59

## 2023-01-01 RX ADMIN — ROBINUL 0.4 MILLIGRAM(S): 0.2 INJECTION INTRAMUSCULAR; INTRAVENOUS at 12:09

## 2023-01-01 RX ADMIN — HYDROMORPHONE HYDROCHLORIDE 0.5 MILLIGRAM(S): 2 INJECTION INTRAMUSCULAR; INTRAVENOUS; SUBCUTANEOUS at 01:15

## 2023-01-01 RX ADMIN — Medication 2: at 06:16

## 2023-01-01 RX ADMIN — Medication 400 MILLIGRAM(S): at 21:45

## 2023-01-01 RX ADMIN — Medication 2: at 17:01

## 2023-01-01 RX ADMIN — ROBINUL 0.4 MILLIGRAM(S): 0.2 INJECTION INTRAMUSCULAR; INTRAVENOUS at 18:59

## 2023-01-01 RX ADMIN — LEVETIRACETAM 400 MILLIGRAM(S): 250 TABLET, FILM COATED ORAL at 05:33

## 2023-01-01 RX ADMIN — CHLORHEXIDINE GLUCONATE 15 MILLILITER(S): 213 SOLUTION TOPICAL at 17:38

## 2023-01-01 RX ADMIN — PANTOPRAZOLE SODIUM 40 MILLIGRAM(S): 20 TABLET, DELAYED RELEASE ORAL at 12:21

## 2023-01-01 RX ADMIN — ROBINUL 0.4 MILLIGRAM(S): 0.2 INJECTION INTRAMUSCULAR; INTRAVENOUS at 00:09

## 2023-01-01 RX ADMIN — Medication 3: at 12:33

## 2023-01-01 RX ADMIN — SCOPALAMINE 1 PATCH: 1 PATCH, EXTENDED RELEASE TRANSDERMAL at 07:41

## 2023-01-01 RX ADMIN — ATORVASTATIN CALCIUM 80 MILLIGRAM(S): 80 TABLET, FILM COATED ORAL at 23:29

## 2023-01-01 RX ADMIN — Medication 3: at 07:00

## 2023-01-01 RX ADMIN — LEVETIRACETAM 400 MILLIGRAM(S): 250 TABLET, FILM COATED ORAL at 06:05

## 2023-01-01 RX ADMIN — Medication 1: at 05:50

## 2023-01-01 RX ADMIN — SODIUM CHLORIDE 25 MILLILITER(S): 5 INJECTION, SOLUTION INTRAVENOUS at 12:30

## 2023-01-01 RX ADMIN — CHLORHEXIDINE GLUCONATE 1 APPLICATION(S): 213 SOLUTION TOPICAL at 06:32

## 2023-01-01 RX ADMIN — SODIUM CHLORIDE 25 MILLILITER(S): 5 INJECTION, SOLUTION INTRAVENOUS at 19:25

## 2023-01-01 RX ADMIN — ROBINUL 0.4 MILLIGRAM(S): 0.2 INJECTION INTRAMUSCULAR; INTRAVENOUS at 06:59

## 2023-01-01 RX ADMIN — Medication 81 MILLIGRAM(S): at 17:31

## 2023-01-01 RX ADMIN — CHLORHEXIDINE GLUCONATE 15 MILLILITER(S): 213 SOLUTION TOPICAL at 05:29

## 2023-01-01 RX ADMIN — Medication 5 MILLIGRAM(S): at 09:33

## 2023-01-01 RX ADMIN — Medication 3: at 17:42

## 2023-01-01 RX ADMIN — Medication 3: at 18:14

## 2023-01-01 RX ADMIN — Medication 1: at 11:14

## 2023-01-01 RX ADMIN — HYDROMORPHONE HYDROCHLORIDE 0.5 MILLIGRAM(S): 2 INJECTION INTRAMUSCULAR; INTRAVENOUS; SUBCUTANEOUS at 18:15

## 2023-01-01 RX ADMIN — Medication 400 MILLIGRAM(S): at 22:32

## 2023-01-01 RX ADMIN — ROBINUL 0.4 MILLIGRAM(S): 0.2 INJECTION INTRAMUSCULAR; INTRAVENOUS at 00:45

## 2023-01-01 RX ADMIN — CHLORHEXIDINE GLUCONATE 15 MILLILITER(S): 213 SOLUTION TOPICAL at 06:05

## 2023-01-01 RX ADMIN — HYDROMORPHONE HYDROCHLORIDE 0.5 MILLIGRAM(S): 2 INJECTION INTRAMUSCULAR; INTRAVENOUS; SUBCUTANEOUS at 09:28

## 2023-01-01 RX ADMIN — ATORVASTATIN CALCIUM 80 MILLIGRAM(S): 80 TABLET, FILM COATED ORAL at 22:46

## 2023-01-01 RX ADMIN — LEVETIRACETAM 400 MILLIGRAM(S): 250 TABLET, FILM COATED ORAL at 18:06

## 2023-01-01 RX ADMIN — HYDROMORPHONE HYDROCHLORIDE 0.5 MILLIGRAM(S): 2 INJECTION INTRAMUSCULAR; INTRAVENOUS; SUBCUTANEOUS at 00:41

## 2023-01-01 RX ADMIN — LEVETIRACETAM 400 MILLIGRAM(S): 250 TABLET, FILM COATED ORAL at 07:22

## 2023-01-01 RX ADMIN — Medication 1: at 07:20

## 2023-01-01 RX ADMIN — ROBINUL 0.4 MILLIGRAM(S): 0.2 INJECTION INTRAMUSCULAR; INTRAVENOUS at 12:21

## 2023-01-01 RX ADMIN — HYDROMORPHONE HYDROCHLORIDE 0.5 MILLIGRAM(S): 2 INJECTION INTRAMUSCULAR; INTRAVENOUS; SUBCUTANEOUS at 06:55

## 2023-01-01 RX ADMIN — SCOPALAMINE 1 PATCH: 1 PATCH, EXTENDED RELEASE TRANSDERMAL at 18:15

## 2023-01-01 RX ADMIN — Medication 1: at 11:42

## 2023-01-01 RX ADMIN — Medication 100 MILLIEQUIVALENT(S): at 06:53

## 2023-01-01 RX ADMIN — CHLORHEXIDINE GLUCONATE 15 MILLILITER(S): 213 SOLUTION TOPICAL at 19:04

## 2023-01-01 RX ADMIN — CHLORHEXIDINE GLUCONATE 15 MILLILITER(S): 213 SOLUTION TOPICAL at 17:12

## 2023-01-01 RX ADMIN — CHLORHEXIDINE GLUCONATE 15 MILLILITER(S): 213 SOLUTION TOPICAL at 07:15

## 2023-01-01 RX ADMIN — ROBINUL 0.4 MILLIGRAM(S): 0.2 INJECTION INTRAMUSCULAR; INTRAVENOUS at 12:05

## 2023-01-01 RX ADMIN — ATORVASTATIN CALCIUM 80 MILLIGRAM(S): 80 TABLET, FILM COATED ORAL at 22:58

## 2023-01-01 RX ADMIN — Medication 2: at 07:26

## 2023-01-01 RX ADMIN — ROBINUL 0.4 MILLIGRAM(S): 0.2 INJECTION INTRAMUSCULAR; INTRAVENOUS at 23:46

## 2023-01-01 RX ADMIN — ATORVASTATIN CALCIUM 80 MILLIGRAM(S): 80 TABLET, FILM COATED ORAL at 21:27

## 2023-01-01 RX ADMIN — Medication 12.5 MILLIGRAM(S): at 07:10

## 2023-01-01 RX ADMIN — LEVETIRACETAM 400 MILLIGRAM(S): 250 TABLET, FILM COATED ORAL at 06:21

## 2023-01-01 RX ADMIN — Medication 1: at 11:00

## 2023-01-01 RX ADMIN — Medication 500 GRAM(S): at 05:34

## 2023-01-01 RX ADMIN — Medication 3: at 23:33

## 2023-01-01 RX ADMIN — SODIUM CHLORIDE 25 MILLILITER(S): 5 INJECTION, SOLUTION INTRAVENOUS at 10:42

## 2023-01-01 RX ADMIN — SODIUM CHLORIDE 25 MILLILITER(S): 5 INJECTION, SOLUTION INTRAVENOUS at 05:24

## 2023-01-01 RX ADMIN — ROBINUL 0.4 MILLIGRAM(S): 0.2 INJECTION INTRAMUSCULAR; INTRAVENOUS at 18:11

## 2023-01-01 RX ADMIN — SCOPALAMINE 1 PATCH: 1 PATCH, EXTENDED RELEASE TRANSDERMAL at 18:10

## 2023-01-01 RX ADMIN — ERTAPENEM SODIUM 120 MILLIGRAM(S): 1 INJECTION, POWDER, LYOPHILIZED, FOR SOLUTION INTRAMUSCULAR; INTRAVENOUS at 14:01

## 2023-01-01 RX ADMIN — ATORVASTATIN CALCIUM 80 MILLIGRAM(S): 80 TABLET, FILM COATED ORAL at 22:09

## 2023-01-01 RX ADMIN — CHLORHEXIDINE GLUCONATE 15 MILLILITER(S): 213 SOLUTION TOPICAL at 17:22

## 2023-01-01 RX ADMIN — ATORVASTATIN CALCIUM 80 MILLIGRAM(S): 80 TABLET, FILM COATED ORAL at 22:54

## 2023-01-01 RX ADMIN — Medication 1000 MILLIGRAM(S): at 10:00

## 2023-01-01 RX ADMIN — SCOPALAMINE 1 PATCH: 1 PATCH, EXTENDED RELEASE TRANSDERMAL at 18:17

## 2023-01-01 RX ADMIN — Medication 650 MILLIGRAM(S): at 23:39

## 2023-01-01 RX ADMIN — Medication 1: at 07:14

## 2023-01-01 RX ADMIN — CHLORHEXIDINE GLUCONATE 15 MILLILITER(S): 213 SOLUTION TOPICAL at 06:20

## 2023-01-01 RX ADMIN — SODIUM CHLORIDE 25 MILLILITER(S): 5 INJECTION, SOLUTION INTRAVENOUS at 15:02

## 2023-01-01 RX ADMIN — HYDROMORPHONE HYDROCHLORIDE 0.5 MILLIGRAM(S): 2 INJECTION INTRAMUSCULAR; INTRAVENOUS; SUBCUTANEOUS at 06:22

## 2023-01-01 RX ADMIN — Medication 81 MILLIGRAM(S): at 11:42

## 2023-01-01 RX ADMIN — Medication 81 MILLIGRAM(S): at 12:20

## 2023-01-01 RX ADMIN — Medication 1: at 11:26

## 2023-01-01 RX ADMIN — Medication 3: at 06:31

## 2023-01-01 RX ADMIN — HYDROMORPHONE HYDROCHLORIDE 0.5 MILLIGRAM(S): 2 INJECTION INTRAMUSCULAR; INTRAVENOUS; SUBCUTANEOUS at 11:52

## 2023-01-01 RX ADMIN — Medication 2: at 23:07

## 2023-01-01 RX ADMIN — SODIUM CHLORIDE 40 MILLILITER(S): 9 INJECTION, SOLUTION INTRAVENOUS at 20:48

## 2023-01-01 RX ADMIN — HYDROMORPHONE HYDROCHLORIDE 0.5 MILLIGRAM(S): 2 INJECTION INTRAMUSCULAR; INTRAVENOUS; SUBCUTANEOUS at 06:54

## 2023-01-01 RX ADMIN — Medication 81 MILLIGRAM(S): at 12:12

## 2023-01-01 RX ADMIN — Medication 400 MILLIGRAM(S): at 07:13

## 2023-01-01 RX ADMIN — CHLORHEXIDINE GLUCONATE 15 MILLILITER(S): 213 SOLUTION TOPICAL at 17:01

## 2023-01-01 RX ADMIN — Medication 1: at 11:40

## 2023-01-01 RX ADMIN — ATORVASTATIN CALCIUM 80 MILLIGRAM(S): 80 TABLET, FILM COATED ORAL at 21:34

## 2023-01-01 RX ADMIN — Medication 3: at 06:45

## 2023-01-01 RX ADMIN — ERTAPENEM SODIUM 120 MILLIGRAM(S): 1 INJECTION, POWDER, LYOPHILIZED, FOR SOLUTION INTRAMUSCULAR; INTRAVENOUS at 13:52

## 2023-01-01 RX ADMIN — Medication 650 MILLIGRAM(S): at 18:17

## 2023-01-01 RX ADMIN — PANTOPRAZOLE SODIUM 40 MILLIGRAM(S): 20 TABLET, DELAYED RELEASE ORAL at 23:17

## 2023-01-01 RX ADMIN — SCOPALAMINE 1 PATCH: 1 PATCH, EXTENDED RELEASE TRANSDERMAL at 10:24

## 2023-01-01 RX ADMIN — ROBINUL 0.4 MILLIGRAM(S): 0.2 INJECTION INTRAMUSCULAR; INTRAVENOUS at 17:16

## 2023-01-01 RX ADMIN — Medication 2: at 00:40

## 2023-01-01 RX ADMIN — Medication 3: at 07:05

## 2023-01-01 RX ADMIN — ATORVASTATIN CALCIUM 80 MILLIGRAM(S): 80 TABLET, FILM COATED ORAL at 21:06

## 2023-01-01 RX ADMIN — Medication 1000 MILLIGRAM(S): at 14:50

## 2023-01-01 RX ADMIN — CHLORHEXIDINE GLUCONATE 1 APPLICATION(S): 213 SOLUTION TOPICAL at 06:15

## 2023-01-01 RX ADMIN — CHLORHEXIDINE GLUCONATE 15 MILLILITER(S): 213 SOLUTION TOPICAL at 17:44

## 2023-01-01 RX ADMIN — SCOPALAMINE 1 PATCH: 1 PATCH, EXTENDED RELEASE TRANSDERMAL at 06:10

## 2023-01-01 RX ADMIN — Medication 1: at 23:16

## 2023-01-01 RX ADMIN — Medication 1: at 18:02

## 2023-01-01 RX ADMIN — Medication 2: at 23:00

## 2023-01-01 RX ADMIN — HYDROMORPHONE HYDROCHLORIDE 0.5 MILLIGRAM(S): 2 INJECTION INTRAMUSCULAR; INTRAVENOUS; SUBCUTANEOUS at 00:45

## 2023-01-01 RX ADMIN — CHLORHEXIDINE GLUCONATE 1 APPLICATION(S): 213 SOLUTION TOPICAL at 07:16

## 2023-01-01 RX ADMIN — Medication 81 MILLIGRAM(S): at 11:22

## 2023-01-01 RX ADMIN — HYDROMORPHONE HYDROCHLORIDE 0.5 MILLIGRAM(S): 2 INJECTION INTRAMUSCULAR; INTRAVENOUS; SUBCUTANEOUS at 09:43

## 2023-01-01 RX ADMIN — SODIUM CHLORIDE 300 MILLILITER(S): 5 INJECTION, SOLUTION INTRAVENOUS at 00:33

## 2023-01-01 RX ADMIN — CHLORHEXIDINE GLUCONATE 15 MILLILITER(S): 213 SOLUTION TOPICAL at 07:28

## 2023-01-01 RX ADMIN — SODIUM CHLORIDE 500 MILLILITER(S): 9 INJECTION INTRAMUSCULAR; INTRAVENOUS; SUBCUTANEOUS at 16:28

## 2023-01-01 RX ADMIN — CEFTRIAXONE 100 MILLIGRAM(S): 500 INJECTION, POWDER, FOR SOLUTION INTRAMUSCULAR; INTRAVENOUS at 00:32

## 2023-01-01 RX ADMIN — HYDROMORPHONE HYDROCHLORIDE 0.5 MILLIGRAM(S): 2 INJECTION INTRAMUSCULAR; INTRAVENOUS; SUBCUTANEOUS at 18:30

## 2023-01-01 RX ADMIN — PANTOPRAZOLE SODIUM 40 MILLIGRAM(S): 20 TABLET, DELAYED RELEASE ORAL at 11:22

## 2023-01-01 RX ADMIN — CHLORHEXIDINE GLUCONATE 1 APPLICATION(S): 213 SOLUTION TOPICAL at 06:06

## 2023-01-01 RX ADMIN — HYDROMORPHONE HYDROCHLORIDE 0.5 MILLIGRAM(S): 2 INJECTION INTRAMUSCULAR; INTRAVENOUS; SUBCUTANEOUS at 11:17

## 2023-01-01 RX ADMIN — Medication 400 MILLIGRAM(S): at 10:24

## 2023-01-01 RX ADMIN — Medication 1000 MILLIGRAM(S): at 22:00

## 2023-01-01 RX ADMIN — Medication 3: at 06:25

## 2023-01-01 RX ADMIN — CHLORHEXIDINE GLUCONATE 1 APPLICATION(S): 213 SOLUTION TOPICAL at 05:42

## 2023-01-01 RX ADMIN — POTASSIUM PHOSPHATE, MONOBASIC POTASSIUM PHOSPHATE, DIBASIC 62.5 MILLIMOLE(S): 236; 224 INJECTION, SOLUTION INTRAVENOUS at 08:42

## 2023-01-01 RX ADMIN — Medication 2: at 18:28

## 2023-01-01 RX ADMIN — CHLORHEXIDINE GLUCONATE 15 MILLILITER(S): 213 SOLUTION TOPICAL at 18:11

## 2023-01-01 RX ADMIN — CHLORHEXIDINE GLUCONATE 1 APPLICATION(S): 213 SOLUTION TOPICAL at 07:28

## 2023-01-01 RX ADMIN — CHLORHEXIDINE GLUCONATE 1 APPLICATION(S): 213 SOLUTION TOPICAL at 06:23

## 2023-01-01 RX ADMIN — Medication 650 MILLIGRAM(S): at 00:50

## 2023-01-01 RX ADMIN — ERTAPENEM SODIUM 120 MILLIGRAM(S): 1 INJECTION, POWDER, LYOPHILIZED, FOR SOLUTION INTRAMUSCULAR; INTRAVENOUS at 13:45

## 2023-01-01 RX ADMIN — Medication 1: at 12:12

## 2023-01-01 RX ADMIN — Medication 10 MILLIGRAM(S): at 16:20

## 2023-01-01 RX ADMIN — CHLORHEXIDINE GLUCONATE 15 MILLILITER(S): 213 SOLUTION TOPICAL at 05:11

## 2023-01-01 RX ADMIN — Medication 2: at 23:39

## 2023-01-01 RX ADMIN — Medication 650 MILLIGRAM(S): at 01:30

## 2023-01-01 RX ADMIN — Medication 5 MILLIGRAM(S): at 13:40

## 2023-01-01 RX ADMIN — CHLORHEXIDINE GLUCONATE 1 APPLICATION(S): 213 SOLUTION TOPICAL at 05:57

## 2023-01-01 RX ADMIN — SCOPALAMINE 1 PATCH: 1 PATCH, EXTENDED RELEASE TRANSDERMAL at 18:36

## 2023-01-01 RX ADMIN — CHLORHEXIDINE GLUCONATE 15 MILLILITER(S): 213 SOLUTION TOPICAL at 17:30

## 2023-01-01 RX ADMIN — ERTAPENEM SODIUM 120 MILLIGRAM(S): 1 INJECTION, POWDER, LYOPHILIZED, FOR SOLUTION INTRAMUSCULAR; INTRAVENOUS at 13:16

## 2023-01-01 RX ADMIN — HYDROMORPHONE HYDROCHLORIDE 1 MG/HR: 2 INJECTION INTRAMUSCULAR; INTRAVENOUS; SUBCUTANEOUS at 12:05

## 2023-01-01 RX ADMIN — ROBINUL 0.4 MILLIGRAM(S): 0.2 INJECTION INTRAMUSCULAR; INTRAVENOUS at 06:21

## 2023-01-01 RX ADMIN — Medication 400 MILLIGRAM(S): at 13:13

## 2023-01-01 RX ADMIN — HYDROMORPHONE HYDROCHLORIDE 0.2 MG/HR: 2 INJECTION INTRAMUSCULAR; INTRAVENOUS; SUBCUTANEOUS at 12:54

## 2023-01-01 RX ADMIN — Medication 81 MILLIGRAM(S): at 11:59

## 2023-01-01 RX ADMIN — Medication 4: at 17:31

## 2023-01-01 RX ADMIN — HYDROMORPHONE HYDROCHLORIDE 0.5 MILLIGRAM(S): 2 INJECTION INTRAMUSCULAR; INTRAVENOUS; SUBCUTANEOUS at 23:52

## 2023-01-01 RX ADMIN — ROBINUL 0.4 MILLIGRAM(S): 0.2 INJECTION INTRAMUSCULAR; INTRAVENOUS at 12:14

## 2023-01-01 RX ADMIN — Medication 500 GRAM(S): at 23:17

## 2023-01-01 RX ADMIN — Medication 3: at 17:15

## 2023-01-01 RX ADMIN — LEVETIRACETAM 400 MILLIGRAM(S): 250 TABLET, FILM COATED ORAL at 17:37

## 2023-01-01 RX ADMIN — Medication 81 MILLIGRAM(S): at 12:01

## 2023-01-01 RX ADMIN — CHLORHEXIDINE GLUCONATE 1 APPLICATION(S): 213 SOLUTION TOPICAL at 07:10

## 2023-01-01 RX ADMIN — Medication 400 MILLIGRAM(S): at 22:52

## 2023-01-01 RX ADMIN — CHLORHEXIDINE GLUCONATE 15 MILLILITER(S): 213 SOLUTION TOPICAL at 05:57

## 2023-01-01 RX ADMIN — ROBINUL 0.4 MILLIGRAM(S): 0.2 INJECTION INTRAMUSCULAR; INTRAVENOUS at 18:17

## 2023-01-01 RX ADMIN — HYDROMORPHONE HYDROCHLORIDE 0.5 MILLIGRAM(S): 2 INJECTION INTRAMUSCULAR; INTRAVENOUS; SUBCUTANEOUS at 07:10

## 2023-01-01 RX ADMIN — CHLORHEXIDINE GLUCONATE 15 MILLILITER(S): 213 SOLUTION TOPICAL at 17:57

## 2023-01-01 RX ADMIN — CHLORHEXIDINE GLUCONATE 15 MILLILITER(S): 213 SOLUTION TOPICAL at 06:30

## 2023-01-01 RX ADMIN — HYDROMORPHONE HYDROCHLORIDE 0.5 MILLIGRAM(S): 2 INJECTION INTRAMUSCULAR; INTRAVENOUS; SUBCUTANEOUS at 16:41

## 2023-01-01 RX ADMIN — Medication 400 MILLIGRAM(S): at 00:23

## 2023-01-01 RX ADMIN — Medication 12.5 MILLIGRAM(S): at 20:06

## 2023-01-01 RX ADMIN — ROBINUL 0.4 MILLIGRAM(S): 0.2 INJECTION INTRAMUSCULAR; INTRAVENOUS at 18:14

## 2023-01-01 RX ADMIN — SCOPALAMINE 1 PATCH: 1 PATCH, EXTENDED RELEASE TRANSDERMAL at 15:45

## 2023-01-01 RX ADMIN — CHLORHEXIDINE GLUCONATE 1 APPLICATION(S): 213 SOLUTION TOPICAL at 06:30

## 2023-01-01 RX ADMIN — Medication 1000 MILLIGRAM(S): at 11:20

## 2023-01-01 RX ADMIN — Medication 12.5 MILLIGRAM(S): at 17:44

## 2023-01-01 RX ADMIN — ROBINUL 0.4 MILLIGRAM(S): 0.2 INJECTION INTRAMUSCULAR; INTRAVENOUS at 06:09

## 2023-01-01 RX ADMIN — HYDROMORPHONE HYDROCHLORIDE 0.5 MILLIGRAM(S): 2 INJECTION INTRAMUSCULAR; INTRAVENOUS; SUBCUTANEOUS at 12:21

## 2023-09-15 NOTE — ED PROVIDER NOTE - CLINICAL SUMMARY MEDICAL DECISION MAKING FREE TEXT BOX
Patient with high suspicion for ischemic L MCA stroke, transfer from outside hospital, intubated on vent, adequately sedated. BP elevated, improved w increased propofol dosing.  Stroke code activated, pt evaluated by stroke team at bedside.  Work up: CT head, CTA head/neck, CT perfusion, EKG, CBC, CMP.  +/- MRI per stroke team recs.  R/o metabolic and peripheral causes of symptoms.  Will re-assess and closely monitor pt.

## 2023-09-15 NOTE — ED ADULT NURSE NOTE - CHIEF COMPLAINT QUOTE
transfer from [facility] as stroke code; per note, pt was found unresponsive at home; pt intubated with Propofol drip infusing

## 2023-09-15 NOTE — ED ADULT NURSE NOTE - NSFALLHARMRISKINTERV_ED_ALL_ED

## 2023-09-15 NOTE — ED ADULT NURSE NOTE - OBJECTIVE STATEMENT
88y F presents to ED after ischemic stroke. Pt presents lethargic, intubated on propofol, 16 fr mathew catheter, and unresponsive to stimuli. LKW 1300, neighbor found pt on floor unresponsive and having had an episode of incontinence at around 1830.

## 2023-09-15 NOTE — ED PROVIDER NOTE - OBJECTIVE STATEMENT
87 yo F w PMH of CVA 2 wks ago, unknown deficit, BIBEMS as transfer from [facility] w stroke, intubated on vent. Pt was found down by family this afternoon. LKW 1400. Left gaze deviation and AMS. Intubated at OSH for airway protection. CTA indeterminent, CT head showed hyperdense L MCA. Possible hypopharynx injury 2/2 intubation. Pt arrived sedated, hypertensive. Stroke code called, stroke team at bedside.

## 2023-09-15 NOTE — ED ADULT NURSE NOTE - CAS TRG GENERAL AIRWAY, MLM
Verified patient with two types of identifiers. Notified patient of results. Patient verbalizes understanding. And will call with any questions or concerns. Patent

## 2023-09-15 NOTE — ED PROVIDER NOTE - PHYSICAL EXAMINATION
CONSTITUTIONAL: unresponsive, intubated, sedated  HEAD: Normocephalic; atraumatic  EYES: Pupils equal bl, sluggishly reactive to light  ENMT: Endotracheal tube in situ  NECK: Atraumatic, normal to inspection, no tracheal deviation  CARD: RRR, no M/R/G  RESP: Bilateral breath sounds with assisted ventilation  ABD: Soft, non-distended, no evidence of trauma  EXT: No evidence of trauma  SKIN: Cool, dry, no rash  NEURO:  GCS 3 (eyes closed, no verbal response, and unresponsive to pain)

## 2023-09-15 NOTE — CONSULT NOTE ADULT - SUBJECTIVE AND OBJECTIVE BOX
**STROKE CODE CONSULT NOTE**    Last known well time/Time of onset of symptoms:    HPI: 88y Female with PMHx of     T(C): --  HR: 78 (09-15-23 @ 22:13) (78 - 78)  BP: 142/80 (09-15-23 @ 22:13) (142/80 - 142/80)  RR: 16 (09-15-23 @ 22:13) (16 - 16)  SpO2: 97% (09-15-23 @ 22:13) (97% - 97%)    PAST MEDICAL & SURGICAL HISTORY:      FAMILY HISTORY:      SOCIAL HISTORY:   Patient lives with *** at ***.   Smoking status:  Drinking:  Drug Use:     ROS: ***  Constitutional: No fever, weight loss or fatigue  Eyes: No eye pain, visual disturbances, or discharge  ENMT:  No difficulty hearing, tinnitus; No sinus or throat pain  Neck: No pain or stiffness  Respiratory: No cough, wheezing, chills or hemoptysis  Cardiovascular: No chest pain, palpitations, shortness of breath, or leg swelling  Gastrointestinal: No abdominal pain. No nausea, vomiting or hematemesis; No diarrhea or constipation. Nohematochezia.  Genitourinary: No dysuria, frequency, hematuria or incontinence  Neurological: As per HPI  Skin: No itching, burning, rashes or lesions   Endocrine: No heat or cold intolerance; No hair loss  Musculoskeletal: No joint pain or swelling; No muscle, back or extremity pain  Heme/Lymph: No easy bruising or bleeding gums    MEDICATIONS  (STANDING):    MEDICATIONS  (PRN):    Allergies    No Known Allergies    Intolerances      Vital Signs Last 24 Hrs  T(C): --  T(F): --  HR: 78 (15 Sep 2023 22:13) (78 - 78)  BP: 142/80 (15 Sep 2023 22:13) (142/80 - 142/80)  BP(mean): --  RR: 16 (15 Sep 2023 22:13) (16 - 16)  SpO2: 97% (15 Sep 2023 22:13) (97% - 97%)    Parameters below as of 15 Sep 2023 22:13  Patient On (Oxygen Delivery Method): ventilator        Physical exam:  Constitutional: No acute distress, conversant  Eyes: Anicteric sclerae, moist conjunctivae, see below for CNs  Neck: trachea midline, FROM, supple, no thyromegaly or lymphadenopathy  Cardiovascular: Regular rate and rhythm, no murmurs, rubs, or gallops. No carotid bruits.   Pulmonary: Anterior breath sounds clear bilaterally, no crackles or wheezing. No use of accessory muscles  GI: Abdomen soft, non-distended, non-tender  Extremities: Radial and DP pulses +2, no edema    Neurologic:  -Mental status: Awake, alert, oriented to person, place, and time. Speech is fluent with intact naming, repetition, and comprehension, no dysarthria. Recent and remote memory intact. Follows commands. Attention/concentration intact. Fund of knowledge appropriate.  -Cranial nerves:   II: Visual fields are full to confrontation.  III, IV, VI: Extraocular movements are intact without nystagmus. Pupils equally round and reactive to light  V:  Facial sensation V1-V3 equal and intact   VII: Face is symmetric with normal eye closure and smile  VIII: Hearing is bilaterally intact to finger rub  IX, X: Uvula is midline and soft palate rises symmetrically  XI: Head turning and shoulder shrug are intact.  XII: Tongue protrudes midline  Motor: Normal bulk and tone. No pronator drift. Strength bilateral upper extremity 5/5, bilateral lower extremities 5/5.  Rapid alternating movements intact and symmetric  Sensation: Intact to light touch bilaterally. No neglect or extinction on double simultaneous testing.  Coordination: No dysmetria on finger-to-nose and heel-to-shin bilaterally  Reflexes: Downgoing toes bilaterally   Gait: Narrow gait and steady    NIHSS: **** ASPECT Score: ***** ICH Score: ****** (GCS)    Fingerstick Blood Glucose: CAPILLARY BLOOD GLUCOSE  154 (15 Sep 2023 22:45)      POCT Blood Glucose.: 154 mg/dL (15 Sep 2023 22:21)    LABS:                        15.6   14.52 )-----------( 255      ( 15 Sep 2023 22:16 )             46.0     09-15    136  |  101  |  22  ----------------------------<  164<H>  5.3   |  23  |  1.05    Ca    9.8      15 Sep 2023 22:16    TPro  7.9  /  Alb  4.8  /  TBili  1.0  /  DBili  x   /  AST  33  /  ALT  12  /  AlkPhos  50  09-15    PT/INR - ( 15 Sep 2023 22:16 )   PT: 10.3 sec;   INR: 0.90          PTT - ( 15 Sep 2023 22:16 )  PTT:33.3 sec      Urinalysis Basic - ( 15 Sep 2023 22:16 )    Color: x / Appearance: x / SG: x / pH: x  Gluc: 164 mg/dL / Ketone: x  / Bili: x / Urobili: x   Blood: x / Protein: x / Nitrite: x   Leuk Esterase: x / RBC: x / WBC x   Sq Epi: x / Non Sq Epi: x / Bacteria: x        RADIOLOGY & ADDITIONAL STUDIES:      -----------------------------------------------------------------------------------------------------------------  IV-tPA (Y/N):    ***                              Bolus time:    Tenecteplase Dose Verification w/ RN:  Reason IV-tPA not given: ***    **STROKE CODE CONSULT NOTE**    Last known well time: 1300 on 9/15    HPI: 87yo Female with PMHX of HTN, HLD, T2DM, hx of TIA vs CVA ~2.5w ago (admitted to [facility] and discharged on ASA) without residual deficits BIBEMS to [facility] ED as trasnfer from [facility] for L MCA stroke as possible thrombectomy candidate. LKW is today at 1300 when patient was speaking normally on the phone, later around 1400 patient was not picking up her phone, family was concerned and called neighbor for wellness check, was found to be unresponsive, in pool of urine with the smoke alarm going off. Patient was brought to [facility] where she was intubated for airway protection, on non-contrast CTH was found to have hyperdense left MCA sign, CTP and CTA H/N nondiagnostic due to poor contrast bolus however did note concern for hypopharynx injury 2/2 traumatic intubation. Pt transferred to [facility] for possible mechanical thrombectomy.    On arrival to [facility] ED, patient intubated and sedated on propofol, , /80. After holding propofol for ~15 mins NIHSS 32, spontaneously moving LUE/LLE, localizing to noxious throughout left side, right UE 0/5, right LE tripleflexion to noxious. Delay in obtaining CT imaging due to stabilizing airway on ventilator and setting up ventilator settings. CTH with evidence of acute L MCA infarct, CTP with mismatch volume of 238cc with mismatch ratio 6.8 within L MCA territory, CTA H/N with left MCA bifurcation occlusion, free air in right carotid deep spaces likely due to traumatic intubation. Pt out of window for thrombolytic treatment. After discussion between Dr. Ontiveros and Dr. Plascencia, patient was deemed not a candidate for mechanical thrombectomy given ischemic changes along L MCA territory on CTH with large correlating core on CTP.     Per family at bedside, patient a normally active and functional and ambulating multiple city blocks at a time. Prior to stroke 2-3 weeks before, patient otherwise fully functional, living alone with well controlled comorbidities.     T(C): --  HR: 78 (09-15-23 @ 22:13) (78 - 78)  BP: 142/80 (09-15-23 @ 22:13) (142/80 - 142/80)  RR: 16 (09-15-23 @ 22:13) (16 - 16)  SpO2: 97% (09-15-23 @ 22:13) (97% - 97%)    PAST MEDICAL & SURGICAL HISTORY:      FAMILY HISTORY:      SOCIAL HISTORY:   Patient lives with  in apartment   Smoking status: unknown  Drinking: unknown   Drug Use: unknown     ROS:   Unable to obtain     MEDICATIONS  (STANDING):    MEDICATIONS  (PRN):    Allergies    No Known Allergies    Intolerances      Vital Signs Last 24 Hrs  T(C): --  T(F): --  HR: 78 (15 Sep 2023 22:13) (78 - 78)  BP: 142/80 (15 Sep 2023 22:13) (142/80 - 142/80)  BP(mean): --  RR: 16 (15 Sep 2023 22:13) (16 - 16)  SpO2: 97% (15 Sep 2023 22:13) (97% - 97%)    Parameters below as of 15 Sep 2023 22:13  Patient On (Oxygen Delivery Method): ventilator    Physical exam while propofol held for 15 mins     Constitutional: Intubated, eyes closed, not following commands   Eyes: Anicteric sclerae, moist conjunctivae, see below for CNs  Neck: trachea midline  Cardiovascular: Regular rate and rhythm  Pulmonary: Vented   GI: Abdomen soft, non-distended  Extremities: Radial and DP pulses +2, no edema    Neurologic:  -Mental status: Comatose, does not open eyes to voice or noxious stimuli, does not follow commands or track, intubated   -Cranial nerves:   II: Blink to threat absent  III, IV, VI: Oculocephalic reflex absent. Pupils equally round and reactive to light  V:  Corneal reflex intact in left eye, absent in right eye   VII: right facial droop   Motor/Sensation: Spontaneously moving LUE/LLE at least 1+/5, localizes to noxious RUE/RLE 0/5, tripleflexion to noxious.   Reflexes: Upgoing toes bilaterally     NIHSS: 32     Fingerstick Blood Glucose: CAPILLARY BLOOD GLUCOSE  154 (15 Sep 2023 22:45)      POCT Blood Glucose.: 154 mg/dL (15 Sep 2023 22:21)    LABS:                        15.6   14.52 )-----------( 255      ( 15 Sep 2023 22:16 )             46.0     09-15    136  |  101  |  22  ----------------------------<  164<H>  5.3   |  23  |  1.05    Ca    9.8      15 Sep 2023 22:16    TPro  7.9  /  Alb  4.8  /  TBili  1.0  /  DBili  x   /  AST  33  /  ALT  12  /  AlkPhos  50  09-15    PT/INR - ( 15 Sep 2023 22:16 )   PT: 10.3 sec;   INR: 0.90          PTT - ( 15 Sep 2023 22:16 )  PTT:33.3 sec      Urinalysis Basic - ( 15 Sep 2023 22:16 )    Color: x / Appearance: x / SG: x / pH: x  Gluc: 164 mg/dL / Ketone: x  / Bili: x / Urobili: x   Blood: x / Protein: x / Nitrite: x   Leuk Esterase: x / RBC: x / WBC x   Sq Epi: x / Non Sq Epi: x / Bacteria: x        RADIOLOGY & ADDITIONAL STUDIES:    < from: CT Brain Stroke Protocol (09.15.23 @ 23:08) >    IMPRESSION:  Loss of gray-white matter differentiation along portions of the left   frontoparietal and temporal lobes with effacement of sulci consistent   with parenchymal edema from acute left MCA infarction. No evidence of   hemorrhagic transformation at this time. No midline shift.      < from: CT Brain Perfusion Maps Stroke (09.15.23 @ 23:10) >  IMPRESSION:    Perfusion imaging predicts a core infarct of 49 cc within the left MCA   territory. Based on the perfusion mismatch, there is a predicted volume   of 126 cc penumbra of tissue at risk.      < from: CT Angio Brain Stroke Protocol  w/ IV Cont (09.15.23 @ 23:12) >  IMPRESSION:      1. Intracranial CTA: Near complete left MCA bifurcation occlusion.  2. Extracranial CTA: No hemodynamically significant occlusion or stenosis.  3. Scattered foci of free air noted in the right neck visceral, mucosal   pharyngeal, and right carotid deep spaces, likely due to possible   traumatic intubation.    -----------------------------------------------------------------------------------------------------------------  IV-tPA (Y/N):  N                              Bolus time:    Tenecteplase Dose Verification w/ RN:  Reason IV-tPA not given: out of window

## 2023-09-15 NOTE — ED ADULT TRIAGE NOTE - CHIEF COMPLAINT QUOTE
transfer from [facility] as stroke code; per note, pt was found unresponsive at home; pt intubated with Propofol drip infusing

## 2023-09-15 NOTE — CONSULT NOTE ADULT - ASSESSMENT
**incomplete**    Recommend    1)Secondary stroke prevention  - hold AC/AP at this time due to increased risk of hemorrhagic conversion given large infarct   - start Atorvastatin 80mg PO daily    2) Stroke risk factors  - please obtain A1C, LDL, TSH    3) Further management  - obtain repeat non contrast head CT in AM  - recommend GOC discussion and palliative care consult   - Na goal 145-155 to prevent interval intracranial edema   - obtain MRI brain w/ and w/o   - recommend SBP goal <180  - recommend q1hr coma checks and vitals   - provide stroke education    Discussed with Neurology Attending Dr. Ontiveros  87yo Female with PMHX of HTN, HLD, T2DM, hx of TIA vs CVA ~2.5w ago (admitted to [facility] and discharged on ASA) without residual deficits BIBEMS to [facility] ED as trasnfer from [facility] for L MCA stroke as possible thrombectomy candidate. LKW is today at 1300 when patient was speaking normally on the phone, later around 1400 patient was not picking up her phone, family was concerned and called neighbor for wellness check, was found to be unresponsive, in pool of urine with the smoke alarm going off. Patient was brought to [facility] where she was intubated for airway protection, on non-contrast CTH was found to have hyperdense left MCA sign, CTP and CTA H/N nondiagnostic due to poor contrast bolus however did note concern for hypopharynx injury 2/2 traumatic intubation. Pt transferred to [facility] for possible mechanical thrombectomy. On arrival to [facility] ED, patient intubated and sedated on propofol, , /80. After holding propofol for ~15 mins NIHSS 32. CTH with evidence of acute L MCA infarct, CTP with mismatch volume of 238cc with mismatch ratio 6.8 within L MCA territory, CTA H/N with left MCA bifurcation occlusion, free air in right carotid deep spaces likely due to traumatic intubation. Pt out of window for thrombolytic treatment. After discussion between Dr. Ontiveros and Dr. Plascencia, patient was deemed not a candidate for mechanical thrombectomy given ischemic changes along L MCA territory on CTH with large correlating core on CTP.     Stroke etiology likely embolic source (cardioembolic vs hypercoagulable state)     Recommend    1)Secondary stroke prevention  - hold AC/AP at this time due to increased risk of hemorrhagic conversion given large infarct   - start Atorvastatin 80mg PO daily if patient passes dysphagia screening     2) Stroke risk factors  - please obtain A1C, LDL, TSH    3) Further management  - obtain repeat non contrast head CT in AM  - recommend GOC discussion and palliative care consult   - obtain collateral from [facility]   - Na goal 145-155 to prevent interval intracranial edema   - obtain MRI brain w/ and w/o   - recommend SBP goal <180  - recommend q1hr coma checks and vitals   - provide stroke education    Discussed with Neurology Attending Dr. Ontiveros

## 2023-09-16 NOTE — PROCEDURE NOTE - NSUSCPTCODES_ED_ALL
17936 Echocardiography Transthoracic with Image 2D (Echo/FAST)
62745, 35969 Extremity US, Non-Vascular (Abscess, Cellulitis, Tendons, Fracture, FB)
15218 US Chest (PTX, Pleural Effussion/CHF vs COPD)

## 2023-09-16 NOTE — PROCEDURE NOTE - NSUS ED ADDITIONAL DETAIL1 FT
Normal RV and LV size and function
No DVT in bilateral LE
A line pattern anteriorly and at the bases with significant consolidation

## 2023-09-16 NOTE — PROGRESS NOTE ADULT - ASSESSMENT
87yo Female with PMHX of HTN, HLD, T2DM, hx of TIA vs CVA ~2.5w ago (admitted to [place] and discharged on ASA) without residual deficits BIBEMS to [facility] ED as trasnfer from [facility] for L MCA stroke as possible thrombectomy candidate. LKW is today at 1300 when patient was speaking normally on the phone, later around 1400 patient was not picking up her phone, family was concerned and called neighbor for wellness check, was found to be unresponsive, in pool of urine with the smoke alarm going off. Patient was brought to [facility] where she was intubated for airway protection, on non-contrast CTH was found to have hyperdense left MCA sign, CTP and CTA H/N nondiagnostic due to poor contrast bolus however did note concern for hypopharynx injury 2/2 traumatic intubation. Pt transferred to [facility] for possible mechanical thrombectomy. On arrival to [facility] ED, patient intubated and sedated on propofol, , /80. After holding propofol for ~15 mins NIHSS 32. CTH with evidence of acute L MCA infarct, CTP with mismatch volume of 238cc with mismatch ratio 6.8 within L MCA territory, CTA H/N with left MCA bifurcation occlusion, free air in right carotid deep spaces likely due to traumatic intubation. Pt out of window for thrombolytic treatment. After discussion between Dr. Ontiveros and Dr. Plascencia, patient was deemed not a candidate for mechanical thrombectomy given ischemic changes along L MCA territory on CTH with large correlating core on CTP.     Stroke etiology likely embolic source (cardioembolic vs hypercoagulable state)     Recommend    1)Secondary stroke prevention  - hold AC/AP at this time due to increased risk of hemorrhagic conversion given large infarct   - continue Atorvastatin 80mg PO daily     2) Stroke risk factors  - please obtain A1C, TSH, and lipid panel    3) Further management  - recommend GOC discussion and palliative care consult; patient's family states they do not believe that patient has discussed DNR/DNI status  - obtain collateral from [facility]   - Na goal 145-155 to prevent interval intracranial edema   - obtain MRI brain w/ and w/o   - recommend SBP goal <180  - recommend q1hr coma checks and vitals   - provide stroke education    Discussed with Neurology Attending Dr. Nisa Galloway   89yo Female with PMHX of HTN, HLD, T2DM, hx of TIA vs CVA ~2.5w ago (admitted to [facility] and discharged on ASA) without residual deficits BIBEMS to [facility] ED as trasnfer from [facility] for L MCA stroke as possible thrombectomy candidate. LKW is today at 1300 when patient was speaking normally on the phone, later around 1400 patient was not picking up her phone, family was concerned and called neighbor for wellness check, was found to be unresponsive, in pool of urine with the smoke alarm going off. Patient was brought to [facility] where she was intubated for airway protection, on non-contrast CTH was found to have hyperdense left MCA sign, CTP and CTA H/N nondiagnostic due to poor contrast bolus however did note concern for hypopharynx injury 2/2 traumatic intubation. Pt transferred to [facility] for possible mechanical thrombectomy. On arrival to [facility] ED, patient intubated and sedated on propofol, , /80. After holding propofol for ~15 mins NIHSS 32. CTH with evidence of acute L MCA infarct, CTP with mismatch volume of 238cc with mismatch ratio 6.8 within L MCA territory, CTA H/N with left MCA bifurcation occlusion, free air in right carotid deep spaces likely due to traumatic intubation. Pt out of window for thrombolytic treatment. After discussion between Dr. Ontiveros and Dr. Plascencia, patient was deemed not a candidate for mechanical thrombectomy given ischemic changes along L MCA territory on CTH with large correlating core on CTP.     Stroke etiology likely embolic source (cardioembolic vs hypercoagulable state)     Recommend    1)Secondary stroke prevention  - hold AC/AP at this time due to increased risk of hemorrhagic conversion given large infarct   - continue Atorvastatin 80mg PO daily     2) Stroke risk factors  - please obtain A1C, TSH, and lipid panel    3) Further management  - recommend GOC discussion and palliative care consult; patient's family states they do not believe that patient has discussed DNR/DNI status  - obtain collateral from [facility]   - Na goal 145-155 to prevent interval intracranial edema   - recommend SBP goal <180  - recommend q1hr coma checks and vitals   - provide stroke education    Discussed with Neurology Attending Dr. Nisa Galloway

## 2023-09-16 NOTE — EEG REPORT - NS EEG TEXT BOX
[facility] Department of Neurology  Inpatient Continuous video-Electroencephalogram    Patient Name:	AMANDA JESSICA    :	1934  MRN:	[id number]    Study Start Date/Time:  2023, 3:14:00 AM  Study End Date/Time: in progress    Referred by: Dr. Frey/Dr. Ontiveros    Brief Clinical History:  AMANDA JESSICA is a 88 year old Female with L MCA stroke; study performed to investigate for seizures or markers of epilepsy.    Diagnosis Code:   R56.9 convulsions/seizure  The live video was: unmonitored.    Pertinent Medications:  n/a    Acquisition Details:  Electroencephalography was acquired using a minimum of 21 channels on an [employer] Neu[employer] system v 9.3.1 with electrode placement according to the standard International 10-20 system following ACNS (American Clinical Neurophysiology Society) guidelines for Long-Term Video EEG monitoring.  Anterior temporal T1 and T2 electrodes were utilized whenever possible.   The [employer] automated spike & seizure detections were all reviewed in detail, in addition to extensive portions of raw EEG.      Day 1: 2023 @ 3:14:00 AM to next morning @ 07:00 am  Background:  continuous, with predominantly alpha and beta frequencies over the right hemisphere and theta-delta frequencies over the left hemisphere.  Symmetry:  frequent polymorphic delta slowing was present over the left frontotemporal areas.   Posterior Dominant Rhythm:  10 Hz, better formed over the right.   Organization: Appropriate anterior-posterior gradient.  Voltage:  Normal (20+ uV)  Variability: Yes. 		Reactivity: Yes.  N2 sleep: Symmetric, synchronous spindles and K complexes.  Spontaneous Activity:  No epileptiform discharges.  Periodic/rhythmic activity:  None  Events:  No electrographic seizures or significant clinical events.  Provocations:  Hyperventilation and Photic stimulation: was not performed.    Daily Summary:    Focal  slowing suggestive of focal cerebral dysfunction in the left frontotemporal areas.    Johana Lewis DO  Attending Neurologist, [facility]

## 2023-09-16 NOTE — PROGRESS NOTE ADULT - CRITICAL CARE ATTENDING COMMENT
Acute MCA infarct with developing thrombus, not a candidate for intervention as per neurosx. Mechanically ventilated. Withdraws to pain but otherwise no mental status. Plan as per above. Poor prognosis.

## 2023-09-16 NOTE — H&P ADULT - NSHPPHYSICALEXAM_GEN_ALL_CORE
General: thin; frail  HEENT: NC/AT; R pupil with no corneal reflex; L pupil sluggish to corneal reflex   Neck: intubated   Cardiovascular: RRR, +S1/S2; NO M/R/G  Respiratory: CTA B/L; no W/R/R  Gastrointestinal: soft, NT/ND; +BSx4  Extremities: WWP; no edema or cyanosis  Vascular: 2+ radial, DP/PT pulses B/L  Neurological: AAOx0; intubated/sedated

## 2023-09-16 NOTE — PROGRESS NOTE ADULT - SUBJECTIVE AND OBJECTIVE BOX
HPI: 89yo female with well controlled HTN, HLD and T2DM presents to ED as a tx from [facility] i/s/o suspected MCA stroke. Pt had stroke-like symptoms a couple of weeks prior with no significant deficits noted at that time and patient was DCed home on aspirin. Today, patient not answering phone and found down by neighbor in wellness check in pool of urine.  BIBEMS to [facility] where she was intubated for airway protection and then subsequently transferred to [facility] for possible neurosurgical intervention.     Per family at bedside, patient a normally active and functional and ambulating multiple city blocks at a time. Prior to stroke 2-3 weeks before, patient otherwise fully functional, living alone with well controlled comorbidities.     ICU Vital Signs Last 24 Hrs  T(C): 37.6 (16 Sep 2023 14:36), Max: 37.6 (16 Sep 2023 09:59)  T(F): 99.7 (16 Sep 2023 14:36), Max: 99.7 (16 Sep 2023 09:59)  HR: 94 (16 Sep 2023 16:00) (67 - 98)  BP: 123/95 (16 Sep 2023 16:00) (116/77 - 159/88)  BP(mean): 106 (16 Sep 2023 16:00) (91 - 118)  RR: 16 (16 Sep 2023 16:00) (13 - 21)  SpO2: 100% (16 Sep 2023 16:00) (97% - 100%)    O2 Parameters below as of 16 Sep 2023 16:00  Patient On (Oxygen Delivery Method): ventilator, CPAP mode    O2 Concentration (%): 40      I&O's Summary    15 Sep 2023 07:01  -  16 Sep 2023 07:00  --------------------------------------------------------  IN: 111.7 mL / OUT: 460 mL / NET: -348.3 mL    16 Sep 2023 07:01  -  16 Sep 2023 16:46  --------------------------------------------------------  IN: 250 mL / OUT: 320 mL / NET: -70 mL      Mode: CPAP with PS  FiO2: 40  PEEP: 5  PS: 5  MAP: 6  PIP: 10    PHYSICAL EXAM:  General: thin; frail  HEENT: NC/AT; b/l pupils w/ corneal reflex, PERRLA   Neck: intubated   Cardiovascular: RRR, +S1/S2; NO M/R/G  Respiratory: CTA B/L; no W/R/R  Gastrointestinal: soft, NT/ND; +BSx4  Extremities: WWP; no edema or cyanosis. Withdraws to pain in all extremities except R Arm. Pt occasionally moves arms spontaneously.  Vascular: 2+ radial, DP/PT pulses B/L  Neurological: AAOx0; intubated. Pt sometimes tracks    LABS:                        14.7   16.08 )-----------( 222      ( 16 Sep 2023 02:37 )             43.1     09-16    142  |  110<H>  |  22  ----------------------------<  171<H>  4.2   |  21<L>  |  0.80    Ca    9.2      16 Sep 2023 11:16    TPro  7.2  /  Alb  4.5  /  TBili  0.4  /  DBili  x   /  AST  27  /  ALT  11  /  AlkPhos  48  09-16    PT/INR - ( 15 Sep 2023 22:16 )   PT: 10.3 sec;   INR: 0.90          PTT - ( 15 Sep 2023 22:16 )  PTT:33.3 sec  Urinalysis Basic - ( 16 Sep 2023 11:16 )    Color: x / Appearance: x / SG: x / pH: x  Gluc: 171 mg/dL / Ketone: x  / Bili: x / Urobili: x   Blood: x / Protein: x / Nitrite: x   Leuk Esterase: x / RBC: x / WBC x   Sq Epi: x / Non Sq Epi: x / Bacteria: x      CAPILLARY BLOOD GLUCOSE  154 (15 Sep 2023 22:45)      POCT Blood Glucose.: 150 mg/dL (16 Sep 2023 16:19)  POCT Blood Glucose.: 147 mg/dL (16 Sep 2023 11:13)  POCT Blood Glucose.: 150 mg/dL (16 Sep 2023 05:43)  POCT Blood Glucose.: 154 mg/dL (15 Sep 2023 22:21)    ABG - ( 16 Sep 2023 02:18 )  pH, Arterial: 7.37  pH, Blood: x     /  pCO2: 36    /  pO2: 180   / HCO3: 21    / Base Excess: -3.9  /  SaO2: 99.7        Consultant(s) Notes Reviewed:  [x ] YES  [ ] NO    MEDICATIONS  (STANDING):  atorvastatin 80 milliGRAM(s) Oral at bedtime  chlorhexidine 0.12% Liquid 15 milliLiter(s) Oral Mucosa every 12 hours  chlorhexidine 2% Cloths 1 Application(s) Topical <User Schedule>  dextrose 5%. 1000 milliLiter(s) (100 mL/Hr) IV Continuous <Continuous>  dextrose 5%. 1000 milliLiter(s) (50 mL/Hr) IV Continuous <Continuous>  dextrose 50% Injectable 25 Gram(s) IV Push once  dextrose 50% Injectable 25 Gram(s) IV Push once  dextrose 50% Injectable 12.5 Gram(s) IV Push once  glucagon  Injectable 1 milliGRAM(s) IntraMuscular once  insulin lispro (ADMELOG) corrective regimen sliding scale   SubCutaneous three times a day before meals  sodium chloride 3%. 500 milliLiter(s) (25 mL/Hr) IV Continuous <Continuous>  sodium chloride 3%. 500 milliLiter(s) (25 mL/Hr) IV Continuous <Continuous>    MEDICATIONS  (PRN):  dextrose Oral Gel 15 Gram(s) Oral once PRN Blood Glucose LESS THAN 70 milliGRAM(s)/deciliter      Care Discussed with Consultants/Other Providers [ x] YES  [ ] NO    RADIOLOGY & ADDITIONAL TESTS:

## 2023-09-16 NOTE — PROGRESS NOTE ADULT - SUBJECTIVE AND OBJECTIVE BOX
HPI: 89yo female with well controlled HTN, HLD and T2DM presents to ED as a tx from [facility] i/s/o suspected MCA stroke. Pt had stroke-like symptoms a couple of weeks prior with no significant deficits noted at that time and patient was DCed home on aspirin. Today, patient not answering phone and found down by neighbor in wellness check in pool of urine.  BIBEMS to [facility] where she was intubated for airway protection and then subsequently transferred to [facility] for possible neurosurgical intervention.     Per family at bedside, patient a normally active and functional and ambulating multiple city blocks at a time. Prior to stroke 2-3 weeks before, patient otherwise fully functional, living alone with well controlled comorbidities.  HPI: 87yo female with well controlled HTN, HLD and T2DM presents to ED as a tx from [facility] i/s/o suspected MCA stroke. Pt had stroke-like symptoms a couple of weeks prior with no significant deficits noted at that time and patient was DCed home on aspirin. Today, patient not answering phone and found down by neighbor in wellness check in pool of urine.  BIBEMS to [facility] where she was intubated for airway protection and then subsequently transferred to [facility] for possible neurosurgical intervention. Per family at bedside, patient a normally active and functional and ambulating multiple city blocks at a time. Prior to stroke 2-3 weeks before, patient otherwise fully functional, living alone with well controlled comorbidities.      ICU Vital Signs Last 24 Hrs  T(C): 37.6 (16 Sep 2023 14:36), Max: 37.6 (16 Sep 2023 09:59)  T(F): 99.7 (16 Sep 2023 14:36), Max: 99.7 (16 Sep 2023 09:59)  HR: 94 (16 Sep 2023 16:00) (67 - 98)  BP: 123/95 (16 Sep 2023 16:00) (116/77 - 159/88)  BP(mean): 106 (16 Sep 2023 16:00) (91 - 118)  RR: 16 (16 Sep 2023 16:00) (13 - 21)  SpO2: 100% (16 Sep 2023 16:00) (97% - 100%)    PHYSICAL EXAM  General: thin; frail  HEENT: NC/AT; PERRL, Corneal reflexes intact b/l   Neck: intubated   Cardiovascular: RRR, +S1/S2; NO M/R/G  Respiratory: CTA B/L; no W/R/R  Gastrointestinal: soft, NT/ND; +BSx4  Extremities: WWP; no edema or cyanosis  Vascular: 2+ radial, DP/PT pulses B/L  Neurological: AAOx0; intubated. Pt occasionally opens eyes and tracks (not on commannd). Withdraws to painful stimuli in 3/4 extremities (not R arm)    O2 Parameters below as of 16 Sep 2023 16:00  Patient On (Oxygen Delivery Method): ventilator, CPAP mode    O2 Concentration (%): 40    I&O's Summary    15 Sep 2023 07:01  -  16 Sep 2023 07:00  --------------------------------------------------------  IN: 111.7 mL / OUT: 460 mL / NET: -348.3 mL    16 Sep 2023 07:01  -  16 Sep 2023 16:52  --------------------------------------------------------  IN: 250 mL / OUT: 320 mL / NET: -70 mL      Mode: CPAP with PS  FiO2: 40  PEEP: 5  PS: 5  MAP: 6  PIP: 10      LABS:                        14.7   16.08 )-----------( 222      ( 16 Sep 2023 02:37 )             43.1     09-16    142  |  110<H>  |  22  ----------------------------<  171<H>  4.2   |  21<L>  |  0.80    Ca    9.2      16 Sep 2023 11:16    TPro  7.2  /  Alb  4.5  /  TBili  0.4  /  DBili  x   /  AST  27  /  ALT  11  /  AlkPhos  48  09-16    PT/INR - ( 15 Sep 2023 22:16 )   PT: 10.3 sec;   INR: 0.90          PTT - ( 15 Sep 2023 22:16 )  PTT:33.3 sec  Urinalysis Basic - ( 16 Sep 2023 11:16 )    Color: x / Appearance: x / SG: x / pH: x  Gluc: 171 mg/dL / Ketone: x  / Bili: x / Urobili: x   Blood: x / Protein: x / Nitrite: x   Leuk Esterase: x / RBC: x / WBC x   Sq Epi: x / Non Sq Epi: x / Bacteria: x      CAPILLARY BLOOD GLUCOSE  154 (15 Sep 2023 22:45)      POCT Blood Glucose.: 150 mg/dL (16 Sep 2023 16:19)  POCT Blood Glucose.: 147 mg/dL (16 Sep 2023 11:13)  POCT Blood Glucose.: 150 mg/dL (16 Sep 2023 05:43)  POCT Blood Glucose.: 154 mg/dL (15 Sep 2023 22:21)    ABG - ( 16 Sep 2023 02:18 )  pH, Arterial: 7.37  pH, Blood: x     /  pCO2: 36    /  pO2: 180   / HCO3: 21    / Base Excess: -3.9  /  SaO2: 99.7      Consultant(s) Notes Reviewed:  [x ] YES  [ ] NO    MEDICATIONS  (STANDING):  atorvastatin 80 milliGRAM(s) Oral at bedtime  chlorhexidine 0.12% Liquid 15 milliLiter(s) Oral Mucosa every 12 hours  chlorhexidine 2% Cloths 1 Application(s) Topical <User Schedule>  dextrose 5%. 1000 milliLiter(s) (50 mL/Hr) IV Continuous <Continuous>  dextrose 5%. 1000 milliLiter(s) (100 mL/Hr) IV Continuous <Continuous>  dextrose 50% Injectable 25 Gram(s) IV Push once  dextrose 50% Injectable 25 Gram(s) IV Push once  dextrose 50% Injectable 12.5 Gram(s) IV Push once  glucagon  Injectable 1 milliGRAM(s) IntraMuscular once  insulin lispro (ADMELOG) corrective regimen sliding scale   SubCutaneous three times a day before meals  sodium chloride 3%. 500 milliLiter(s) (25 mL/Hr) IV Continuous <Continuous>  sodium chloride 3%. 500 milliLiter(s) (25 mL/Hr) IV Continuous <Continuous>    MEDICATIONS  (PRN):  dextrose Oral Gel 15 Gram(s) Oral once PRN Blood Glucose LESS THAN 70 milliGRAM(s)/deciliter      Care Discussed with Consultants/Other Providers [ x] YES  [ ] NO    RADIOLOGY & ADDITIONAL TESTS:

## 2023-09-16 NOTE — H&P ADULT - ATTENDING COMMENTS
88 year old female with hx of DM2, HTN, HLD presents to the ER from outside hospital after she was found down at home. Patient is a highly functioning 88 year old with recent stroke like symptoms earlier this month which she was discharged on aspirin. 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 Leading to transfer to [facility] for neurosurgical evaluation.    Patient intubated and sedated at time of examination with sluggish pupil on the R but good response on the L. Moves bilateral LE spontaneously as well as bilateral upper extremities. After discussion with neurosurgery service there is no surgical intervention at this time as she appears to have large area of stroke. Anticipate that ICP will increase as the brain swells from the stroke. Given this will start her on hypersaline therapy to reduce ICP and increase sodium goal 145 -155. Ventilator set at goal with PCO2 of 36 and will control BP to less 160 systolic and diastolic less 100. EEG and wean sedation to monitor neurostatus and see if she can wake up. She does have WBC elevation but no consolidation on US with A line patten and normal cardiac function, will hold antibiotics for now.    Plan:  - EEG monitoring  - 3%salinewith goal sodium 145-155  - BP systolic less 160 and diastolic less 100, cardine drip if needed  - Wean sedation allow her to wake up  - Neurosurgery consulted  - Monitor off antibiotics for now .

## 2023-09-16 NOTE — H&P ADULT - HISTORY OF PRESENT ILLNESS
HPI: 87yo female with well controlled HTN, HLD and T2DM presents to ED as a tx from [facility] i/s/o suspected MCA stroke. Pt had stroke-like symptoms a couple of weeks prior with no significant deficits noted at that time and patient was DCed home on aspirin. Today, patient not answering phone and found down by neighbor in wellness check in pool of urine.  BIBEMS to [facility] where she was intubated for airway protection and then subsequently transferred to [facility] for possible neurosurgical intervention.     Per family at bedside, patient a normally active and functional and ambulating multiple city blocks at a time. Prior to stroke 2-3 weeks before, patient otherwise fully functional, living alone with well controlled comorbidities.       12 Point ROS Negative unless noted otherwise above.

## 2023-09-16 NOTE — PROGRESS NOTE ADULT - SUBJECTIVE AND OBJECTIVE BOX
Neurology Stroke Progress Note    INTERVAL HPI/OVERNIGHT EVENTS:  Patient seen and examined. Remains intubated.     MEDICATIONS  (STANDING):  atorvastatin 80 milliGRAM(s) Oral at bedtime  chlorhexidine 0.12% Liquid 15 milliLiter(s) Oral Mucosa every 12 hours  dextrose 5%. 1000 milliLiter(s) (50 mL/Hr) IV Continuous <Continuous>  dextrose 5%. 1000 milliLiter(s) (100 mL/Hr) IV Continuous <Continuous>  dextrose 50% Injectable 25 Gram(s) IV Push once  dextrose 50% Injectable 25 Gram(s) IV Push once  dextrose 50% Injectable 12.5 Gram(s) IV Push once  glucagon  Injectable 1 milliGRAM(s) IntraMuscular once  insulin lispro (ADMELOG) corrective regimen sliding scale   SubCutaneous three times a day before meals  sodium chloride 3%. 500 milliLiter(s) (25 mL/Hr) IV Continuous <Continuous>  sodium chloride 3%. 500 milliLiter(s) (25 mL/Hr) IV Continuous <Continuous>    MEDICATIONS  (PRN):  dextrose Oral Gel 15 Gram(s) Oral once PRN Blood Glucose LESS THAN 70 milliGRAM(s)/deciliter      Allergies    No Known Allergies    Intolerances        Vital Signs Last 24 Hrs  T(C): 37.6 (16 Sep 2023 14:36), Max: 37.6 (16 Sep 2023 09:59)  T(F): 99.7 (16 Sep 2023 14:36), Max: 99.7 (16 Sep 2023 09:59)  HR: 89 (16 Sep 2023 15:00) (67 - 98)  BP: 122/80 (16 Sep 2023 15:00) (116/77 - 159/88)  BP(mean): 96 (16 Sep 2023 15:00) (91 - 118)  RR: 19 (16 Sep 2023 15:00) (13 - 21)  SpO2: 100% (16 Sep 2023 15:00) (97% - 100%)    Parameters below as of 16 Sep 2023 15:00  Patient On (Oxygen Delivery Method): ventilator, CPAP mode    O2 Concentration (%): 40    Neurologic:  -Mental status: Does not open eyes to voice or noxious stimuli, does not follow commands or track, intubated   -Cranial nerves:   II: Blink to threat absent  III, IV, VI: Oculocephalic reflex absent. Pupils equally round and reactive to light  V:  Corneal reflex intact in left eye, absent in right eye   VII: Right facial droop   Motor/Sensation: Spontaneously moving LUE/LLE at least 2/5, localizes to noxious with left upper extremity. RUE/RLE 1/5, tripleflexion to noxious.   Reflexes: Upgoing toes bilaterally     LABS:                        14.7   16.08 )-----------( 222      ( 16 Sep 2023 02:37 )             43.1     09-16    142  |  110<H>  |  22  ----------------------------<  171<H>  4.2   |  21<L>  |  0.80    Ca    9.2      16 Sep 2023 11:16    TPro  7.2  /  Alb  4.5  /  TBili  0.4  /  DBili  x   /  AST  27  /  ALT  11  /  AlkPhos  48  09-16    PT/INR - ( 15 Sep 2023 22:16 )   PT: 10.3 sec;   INR: 0.90          PTT - ( 15 Sep 2023 22:16 )  PTT:33.3 sec  Urinalysis Basic - ( 16 Sep 2023 11:16 )    Color: x / Appearance: x / SG: x / pH: x  Gluc: 171 mg/dL / Ketone: x  / Bili: x / Urobili: x   Blood: x / Protein: x / Nitrite: x   Leuk Esterase: x / RBC: x / WBC x   Sq Epi: x / Non Sq Epi: x / Bacteria: x        RADIOLOGY & ADDITIONAL TESTS:  < from: CT Head No Cont (09.16.23 @ 13:46) >  IMPRESSION:    1.  Redemonstrated infarct in the left MCA distribution, more evolved   when compared prior imaging. There is a bright left MCA sign in the   distal left M1/proximal M2 segment, compatible with a probable thrombus.  2.  Decreased gray-white matter discrimination in the inferior left   temporal lobe and inferior left occipital lobe, more conspicuous when   compared prior imaging. Finding is likely compatible with a developing   left PCA distribution infarct. Given findings of multiple vascular   distributions, a embolic source should be considered.  3.  Faint increased attenuation along the sulci of the superiormargin of   the infarcted left MCA territory. This could represent small petechial   hemorrhages versus trace subarachnoid blood.    < end of copied text >

## 2023-09-16 NOTE — CONSULT NOTE ADULT - SUBJECTIVE AND OBJECTIVE BOX
AMANDA JESSICA, 88y, Female  MRN-[id number]    ****ICU Consult Note****  HPI: 89yo female with well controlled HTN, HLD and T2DM presents to ED as a tx from [facility] i/s/o suspected MCA stroke. Pt had stroke-like symptoms a couple of weeks prior with no significant deficits noted at that time and patient was DCed home on aspirin. Today, patient not answering phone and found down by neighbor in wellness check in pool of urine.  BIBEMS to [facility] where she was intubated for airway protection and then subsequently transferred to [facility] for possible neurosurgical intervention.     Per family at bedside, patient a normally active and functional and ambulating multiple city blocks at a time. Prior to stroke 2-3 weeks before, patient otherwise       12 Point ROS Negative unless noted otherwise above.  -------------------------------------------------------------------------------  VITAL SIGNS:  Vital Signs Last 24 Hrs  HR: 78 (15 Sep 2023 22:13) (78 - 78)  BP: 142/80 (15 Sep 2023 22:13) (142/80 - 142/80)  RR: 16 (15 Sep 2023 22:13) (16 - 16)  SpO2: 97% (15 Sep 2023 22:13) (97% - 97%)    Parameters below as of 15 Sep 2023 22:13  Patient On (Oxygen Delivery Method): ventilator    I&O's Summary      PHYSICAL EXAM:    General: NAD, well developed  HEENT: NC/AT; EOMI, PERRLA, anicteric sclera; moist mucosal membranes.  Neck: supple, trachea midline  Cardiovascular: RRR, +S1/S2; NO M/R/G  Respiratory: CTA B/L; no W/R/R  Gastrointestinal: soft, NT/ND; +BSx4  Extremities: WWP; no edema or cyanosis  Vascular: 2+ radial, DP/PT pulses B/L  Neurological: AAOx3; no focal deficits    ALLERGIES:  Allergies    No Known Allergies    Intolerances    MEDICATIONS:  MEDICATIONS  (STANDING):    MEDICATIONS  (PRN):  -------------------------------------------------------------------------------  LABS:                        15.6   14.52 )-----------( 255      ( 15 Sep 2023 22:16 )             46.0     09-15    136  |  101  |  22  ----------------------------<  164<H>  5.3   |  23  |  1.05    Ca    9.8      15 Sep 2023 22:16    TPro  7.9  /  Alb  4.8  /  TBili  1.0  /  DBili  x   /  AST  33  /  ALT  12  /  AlkPhos  50  09-15    LIVER FUNCTIONS - ( 15 Sep 2023 22:16 )  Alb: 4.8 g/dL / Pro: 7.9 g/dL / ALK PHOS: 50 U/L / ALT: 12 U/L / AST: 33 U/L / GGT: x           PT/INR - ( 15 Sep 2023 22:16 )   PT: 10.3 sec;   INR: 0.90          PTT - ( 15 Sep 2023 22:16 )  PTT:33.3 sec  Urinalysis Basic - ( 15 Sep 2023 22:16 )    Color: x / Appearance: x / SG: x / pH: x  Gluc: 164 mg/dL / Ketone: x  / Bili: x / Urobili: x   Blood: x / Protein: x / Nitrite: x   Leuk Esterase: x / RBC: x / WBC x   Sq Epi: x / Non Sq Epi: x / Bacteria: x      CAPILLARY BLOOD GLUCOSE  154 (15 Sep 2023 22:45)      POCT Blood Glucose.: 154 mg/dL (15 Sep 2023 22:21)    RADIOLOGY & ADDITIONAL TESTS: Reviewed AMANDA JESSICA, 88y, Female  MRN-[id number]    ****ICU Consult Note****  HPI: 87yo female with well controlled HTN, HLD and T2DM presents to ED as a tx from [facility] i/s/o suspected MCA stroke. Pt had stroke-like symptoms a couple of weeks prior with no significant deficits noted at that time and patient was DCed home on aspirin. Today, patient not answering phone and found down by neighbor in wellness check in pool of urine.  BIBEMS to [facility] where she was intubated for airway protection and then subsequently transferred to [facility] for possible neurosurgical intervention.     Per family at bedside, patient a normally active and functional and ambulating multiple city blocks at a time. Prior to stroke 2-3 weeks before, patient otherwise fully functional, living alone with well controlled comorbidities.       12 Point ROS Negative unless noted otherwise above.  -------------------------------------------------------------------------------  VITAL SIGNS:  Vital Signs Last 24 Hrs  HR: 78 (15 Sep 2023 22:13) (78 - 78)  BP: 142/80 (15 Sep 2023 22:13) (142/80 - 142/80)  RR: 16 (15 Sep 2023 22:13) (16 - 16)  SpO2: 97% (15 Sep 2023 22:13) (97% - 97%)    Parameters below as of 15 Sep 2023 22:13  Patient On (Oxygen Delivery Method): ventilator    I&O's Summary      PHYSICAL EXAM:    General: thin; frail  HEENT: NC/AT; R pupil with no corneal reflex; L pupil sluggish to corneal reflex   Neck: intubated   Cardiovascular: RRR, +S1/S2; NO M/R/G  Respiratory: CTA B/L; no W/R/R  Gastrointestinal: soft, NT/ND; +BSx4  Extremities: WWP; no edema or cyanosis  Vascular: 2+ radial, DP/PT pulses B/L  Neurological: AAOx0; intubated/sedated     ALLERGIES:  Allergies    No Known Allergies    Intolerances    MEDICATIONS:  MEDICATIONS  (STANDING):    MEDICATIONS  (PRN):  -------------------------------------------------------------------------------  LABS:                        15.6   14.52 )-----------( 255      ( 15 Sep 2023 22:16 )             46.0     09-15    136  |  101  |  22  ----------------------------<  164<H>  5.3   |  23  |  1.05    Ca    9.8      15 Sep 2023 22:16    TPro  7.9  /  Alb  4.8  /  TBili  1.0  /  DBili  x   /  AST  33  /  ALT  12  /  AlkPhos  50  09-15    LIVER FUNCTIONS - ( 15 Sep 2023 22:16 )  Alb: 4.8 g/dL / Pro: 7.9 g/dL / ALK PHOS: 50 U/L / ALT: 12 U/L / AST: 33 U/L / GGT: x           PT/INR - ( 15 Sep 2023 22:16 )   PT: 10.3 sec;   INR: 0.90          PTT - ( 15 Sep 2023 22:16 )  PTT:33.3 sec  Urinalysis Basic - ( 15 Sep 2023 22:16 )    Color: x / Appearance: x / SG: x / pH: x  Gluc: 164 mg/dL / Ketone: x  / Bili: x / Urobili: x   Blood: x / Protein: x / Nitrite: x   Leuk Esterase: x / RBC: x / WBC x   Sq Epi: x / Non Sq Epi: x / Bacteria: x      CAPILLARY BLOOD GLUCOSE  154 (15 Sep 2023 22:45)      POCT Blood Glucose.: 154 mg/dL (15 Sep 2023 22:21)    RADIOLOGY & ADDITIONAL TESTS: Reviewed

## 2023-09-16 NOTE — PROGRESS NOTE ADULT - ASSESSMENT
89yo female with PMHx of HTN, HLD, T2DM presents to [facility] as a tx from [facility] i/s/o concern for large L sided MCA stroke. ICU consulted for further management.     Neuro  #MCA Stroke  #MCA Thrombus  Pt intubated @ [facility] i/s/o airway protection for suspected large L MCA stroke. CT brain stroke protocol showing opacified b/l MCAs L>R, cannot exclude thrombus. Per Neurosurgical team, CT brain perfusion showing infarction around MCA territory which is possibly too substantial to allow for adequate brain recovery even if intervention is done on thrombus.  Per Neurosurg, no plan for intervention on thrombus and MCA stroke expected to enlarge with larger infarction to be expected with possible cerebral edema however no edema witnessed on scans at this time.   - will wean sedation to see neurologic status -- patient spontaneously moving b/l LE and LUE however no movement seen in RUE; R pupil non-reactive; L pupil sluggish to corneal reflex  - strict BP control w/ SBP b/w 120-160 -- will start cardene gtt  - Na goal 145-155 i/s/o stroke -- bolus NaCl 3% 100cc and will recheck BMP -- will continue with 25cc/hr with q4hr BMPs per neurosurgery  - Palliative consulted i/s/o possible worsening infarction with no intervention -- family support needed       Cards  #Hx of HTN  #BP Control  Will exhibit strict BP control i/s/o stroke. BPs in ED labile ranging from -190s.   - start cardene gtt with titration goal of -160    Respiratory  #AHRF i/s/o MCA Stroke w/ Asp Pna   CT Chest showing signs of possible aspiration pna i/s/o intubation. WBC 12 however likely reactive i/s/o stroke. Afebrile.   - starting CTX in NaCl 2g IV q24hr  - will obtain sputum cx  - aspiration precautions   - continue to monitor     Renal  #Permissive Hypernatremia i/s/o MCA Infarct   Na 136 on admission. Per protocol, NaCl 3% 100cc bolus.   - check BMP upon arrival to MICU  - start gtt @ 25 cc/hr with q4hr BMPs to monitor Na -- goal 145-155    #Indwelling mathew  - monitor urine output -- placed in MICU    Heme  #Elevated Hgb  HGB 15 on admission likely i/s/o hemoconcentration.   - f/u repeat on AM labs    Endo  #Hx of T2DM  Hx of T2DM. A1c and home medications unknown.  - f/u A1c in AM  - started Kaiden     ID  #Asp Pna  - See Pulm as above    Prophylaxis:  - DVT: SCDs   - GI: none    87yo female with PMHx of HTN, HLD, T2DM presents to [facility] as a tx from [facility] i/s/o concern for large L sided MCA stroke. ICU consulted for further management.     Neuro  #MCA Stroke  #MCA Thrombus  Pt intubated @ [facility] i/s/o airway protection for suspected large L MCA stroke. CT brain stroke protocol showing opacified b/l MCAs L>R, cannot exclude thrombus. Per Neurosurgical team, CT brain perfusion showing infarction around MCA territory which is possibly too substantial to allow for adequate brain recovery even if intervention is done on thrombus.  Per Neurosurg, no plan for intervention on thrombus and MCA stroke expected to enlarge with larger infarction to be expected with possible cerebral edema however no edema witnessed on scans at this time.   - will wean sedation to see neurologic status -- patient spontaneously moving b/l LE and LUE however no movement seen in RUE;  - strict BP control w/ SBP b/w 120-160 -- will start cardene gtt  - Na goal 145-155 i/s/o stroke -- bolus NaCl 3% 100cc and will recheck BMP -- will continue with 25cc/hr with q4hr BMPs per neurosurgery  - No ASA per neurosurgery  - Palliative consulted i/s/o possible worsening infarction with no intervention -- family support needed       Cards  #Hx of HTN  #BP Control  Will exhibit strict BP control i/s/o stroke. BPs in ED labile ranging from -190s.   - start cardene gtt with titration goal of -160    Respiratory  #AHRF i/s/o MCA Stroke w/ Asp Pna   CT Chest showing signs of possible aspiration pna i/s/o intubation. WBC 12 however likely reactive i/s/o stroke. Afebrile.   - starting CTX in NaCl 2g IV q24hr  - will obtain sputum cx  - aspiration precautions   - continue to monitor     Renal  #Permissive Hypernatremia i/s/o MCA Infarct   Na 136 on admission. Per protocol, NaCl 3% 100cc bolus.   - check BMP upon arrival to MICU  - start gtt @ 25 cc/hr with q4hr BMPs to monitor Na -- goal 145-155    #Indwelling mathew  - monitor urine output -- placed in MICU    Heme  #Elevated Hgb  HGB 15 on admission likely i/s/o hemoconcentration.   - f/u repeat on AM labs    Endo  #Hx of T2DM  Hx of T2DM. A1c and home medications unknown.  - f/u A1c in AM  - started Kaiden     ID  #Asp Pna  - See Pulm as above    Prophylaxis:  - DVT: SCDs   - GI: none

## 2023-09-16 NOTE — CONSULT NOTE ADULT - SUBJECTIVE AND OBJECTIVE BOX
HISTORY OF PRESENT ILLNESS:     Last known well time: 1300 on 9/15    HPI: 89yo Female with PMHX of HTN, HLD, T2DM, hx of TIA vs CVA ~2.5w ago (admitted to [place] and discharged on ASA) without residual deficits BIBEMS to [facility] ED as trasnfer from [facility] for L MCA stroke as possible thrombectomy candidate. LKW is today at 1300 when patient was speaking normally on the phone, later around 1400 patient was not picking up her phone, family was concerned and called neighbor for wellness check, was found to be unresponsive, in pool of urine with the smoke alarm going off. Patient was brought to [facility] where she was intubated for airway protection, on non-contrast CTH was found to have hyperdense left MCA sign, CTP and CTA H/N nondiagnostic due to poor contrast bolus however did note concern for hypopharynx injury 2/2 traumatic intubation. Pt transferred to [facility] for possible mechanical thrombectomy.    On arrival to [facility] ED, patient intubated and sedated on propofol, , /80. After holding propofol for ~15 mins NIHSS 32, spontaneously moving LUE/LLE, localizing to noxious throughout left side, right UE 0/5, right LE tripleflexion to noxious. Delay in obtaining CT imaging due to stabilizing airway on ventilator and setting up ventilator settings. CTH with evidence of acute L MCA infarct, CTP with mismatch volume of 238cc with mismatch ratio 6.8 within L MCA territory, CTA H/N with left MCA bifurcation occlusion, free air in right carotid deep spaces likely due to traumatic intubation. Pt out of window for thrombolytic treatment. After discussion between Dr. Ontiveros and Dr. Plascencia, patient was deemed not a candidate for mechanical thrombectomy given ischemic changes along L MCA territory on CTH with large correlating core on CTP.     Per family at bedside, patient a normally active and functional and ambulating multiple city blocks at a time. Prior to stroke 2-3 weeks before, patient otherwise fully functional, living alone with well controlled comorbidities.     NSGY consulted for hemicrani watch    PAST MEDICAL & SURGICAL HISTORY: HTN, HLD, T2DM, TIA    FAMILY HISTORY:  unknown    SOCIAL HISTORY:  Patient lives with  in apartment   Smoking status: unknown  Drinking: unknown   Drug Use: unknown     Allergies    No Known Allergies    Intolerances        REVIEW OF SYSTEMS  Unable to obtain due to mental status	      MEDICATIONS:  Antibiotics:  cefTRIAXone   IVPB 2000 milliGRAM(s) IV Intermittent every 24 hours    Neuro:    Anticoagulation:    OTHER:  chlorhexidine 0.12% Liquid 15 milliLiter(s) Oral Mucosa every 12 hours  niCARdipine Infusion 5 mG/Hr IV Continuous <Continuous>    IVF:      Vital Signs Last 24 Hrs  T(C): 36.3 (15 Sep 2023 22:52), Max: 36.3 (15 Sep 2023 22:52)  T(F): 97.3 (15 Sep 2023 22:52), Max: 97.3 (15 Sep 2023 22:52)  HR: 89 (16 Sep 2023 01:10) (67 - 89)  BP: 145/73 (16 Sep 2023 01:10) (142/80 - 159/88)  BP(mean): 104 (16 Sep 2023 01:10) (104 - 104)  RR: 14 (16 Sep 2023 01:10) (14 - 16)  SpO2: 99% (16 Sep 2023 01:10) (97% - 99%)    Parameters below as of 16 Sep 2023 01:10  Patient On (Oxygen Delivery Method): ventilator    O2 Concentration (%): 40    PHYSICAL EXAM:  Constitutional:  Eyes:  ENMT:  Neck:  Back:  Respiratory:  Cardiovascular:  Gastrointestinal:  Genitourinary:  Rectal:  Vascular:  Neurological:  Skin:    LABS:                        15.6   14.52 )-----------( 255      ( 15 Sep 2023 22:16 )             46.0     09-15    136  |  101  |  22  ----------------------------<  164<H>  5.3   |  23  |  1.05    Ca    9.8      15 Sep 2023 22:16    TPro  7.9  /  Alb  4.8  /  TBili  1.0  /  DBili  x   /  AST  33  /  ALT  12  /  AlkPhos  50  09-15    PT/INR - ( 15 Sep 2023 22:16 )   PT: 10.3 sec;   INR: 0.90          PTT - ( 15 Sep 2023 22:16 )  PTT:33.3 sec  Urinalysis Basic - ( 15 Sep 2023 22:16 )    Color: x / Appearance: x / SG: x / pH: x  Gluc: 164 mg/dL / Ketone: x  / Bili: x / Urobili: x   Blood: x / Protein: x / Nitrite: x   Leuk Esterase: x / RBC: x / WBC x   Sq Epi: x / Non Sq Epi: x / Bacteria: x      CULTURES:      RADIOLOGY & ADDITIONAL STUDIES:    Assessment:  89yo Female with PMHX of HTN, HLD, T2DM, hx of TIA vs CVA ~2.5w ago (admitted to [place] and discharged on ASA) without residual deficits BIBEMS to [facility] ED as trasnfer from [facility] for L MCA stroke as possible thrombectomy candidate. LKW is today at 1300 when patient was speaking normally on the phone, later around 1400 patient was not picking up her phone, family was concerned and called neighbor for wellness check, was found to be unresponsive, in pool of urine with the smoke alarm going off. Patient was brought to [facility] where she was intubated for airway protection, on non-contrast CTH was found to have hyperdense left MCA sign, CTP and CTA H/N nondiagnostic due to poor contrast bolus however did note concern for hypopharynx injury 2/2 traumatic intubation. Pt transferred to [facility] for possible mechanical thrombectomy. On arrival to [facility] ED, patient intubated and sedated on propofol, , /80. After holding propofol for ~15 mins NIHSS 32, spontaneously moving LUE/LLE, localizing to noxious throughout left side, right UE 0/5, right LE tripleflexion to noxious. Delay in obtaining CT imaging due to stabilizing airway on ventilator and setting up ventilator settings. CTH with evidence of acute L MCA infarct, CTP with mismatch volume of 238cc with mismatch ratio 6.8 within L MCA territory, CTA H/N with left MCA bifurcation occlusion, free air in right carotid deep spaces likely due to traumatic intubation. Pt out of window for thrombolytic treatment. After discussion between Dr. Ontiveros and Dr. Plascencia, patient was deemed not a candidate for mechanical thrombectomy given ischemic changes along L MCA territory on CTH with large correlating core on CTP. Per family at bedside, patient a normally active and functional and ambulating multiple city blocks at a time. Prior to stroke 2-3 weeks before, patient otherwise fully functional, living alone with well controlled comorbidities. NSGY consulted for hemicrani watch      Plan:     HISTORY OF PRESENT ILLNESS:     Last known well time: 1300 on 9/15    HPI: 87yo Female with PMHX of HTN, HLD, T2DM, hx of TIA vs CVA ~2.5w ago (admitted to [place] and discharged on ASA) without residual deficits BIBEMS to [facility] ED as trasnfer from [facility] for L MCA stroke as possible thrombectomy candidate. LKW is today at 1300 when patient was speaking normally on the phone, later around 1400 patient was not picking up her phone, family was concerned and called neighbor for wellness check, was found to be unresponsive, in pool of urine with the smoke alarm going off. Patient was brought to [facility] where she was intubated for airway protection, on non-contrast CTH was found to have hyperdense left MCA sign, CTP and CTA H/N nondiagnostic due to poor contrast bolus however did note concern for hypopharynx injury 2/2 traumatic intubation. Pt transferred to [facility] for possible mechanical thrombectomy.    On arrival to [facility] ED, patient intubated and sedated on propofol, , /80. After holding propofol for ~15 mins NIHSS 32, spontaneously moving LUE/LLE, localizing to noxious throughout left side, right UE 0/5, right LE tripleflexion to noxious. Delay in obtaining CT imaging due to stabilizing airway on ventilator and setting up ventilator settings. CTH with evidence of acute L MCA infarct, CTP with mismatch volume of 238cc with mismatch ratio 6.8 within L MCA territory, CTA H/N with left MCA bifurcation occlusion, free air in right carotid deep spaces likely due to traumatic intubation. Pt out of window for thrombolytic treatment. After discussion between Dr. Ontiveros and Dr. Plascencia, patient was deemed not a candidate for mechanical thrombectomy given ischemic changes along L MCA territory on CTH with large correlating core on CTP.     Per family at bedside, patient a normally active and functional and ambulating multiple city blocks at a time. Prior to stroke 2-3 weeks before, patient otherwise fully functional, living alone with well controlled comorbidities.     NSGY consulted for hemicrani watch    PAST MEDICAL & SURGICAL HISTORY: HTN, HLD, T2DM, TIA    FAMILY HISTORY:  unknown    SOCIAL HISTORY:  Patient lives with  in apartment   Smoking status: unknown  Drinking: unknown   Drug Use: unknown     Allergies    No Known Allergies    Intolerances        REVIEW OF SYSTEMS  Unable to obtain due to mental status	      MEDICATIONS:  Antibiotics:  cefTRIAXone   IVPB 2000 milliGRAM(s) IV Intermittent every 24 hours    Neuro:    Anticoagulation:    OTHER:  chlorhexidine 0.12% Liquid 15 milliLiter(s) Oral Mucosa every 12 hours  niCARdipine Infusion 5 mG/Hr IV Continuous <Continuous>    IVF:      Vital Signs Last 24 Hrs  T(C): 36.3 (15 Sep 2023 22:52), Max: 36.3 (15 Sep 2023 22:52)  T(F): 97.3 (15 Sep 2023 22:52), Max: 97.3 (15 Sep 2023 22:52)  HR: 89 (16 Sep 2023 01:10) (67 - 89)  BP: 145/73 (16 Sep 2023 01:10) (142/80 - 159/88)  BP(mean): 104 (16 Sep 2023 01:10) (104 - 104)  RR: 14 (16 Sep 2023 01:10) (14 - 16)  SpO2: 99% (16 Sep 2023 01:10) (97% - 99%)    Parameters below as of 16 Sep 2023 01:10  Patient On (Oxygen Delivery Method): ventilator    O2 Concentration (%): 40    PHYSICAL EXAM:  Constitutional:  Eyes:  ENMT:  Neck:  Back:  Respiratory:  Cardiovascular:  Gastrointestinal:  Genitourinary:  Rectal:  Vascular:  Neurological:  Skin:    LABS:                        15.6   14.52 )-----------( 255      ( 15 Sep 2023 22:16 )             46.0     09-15    136  |  101  |  22  ----------------------------<  164<H>  5.3   |  23  |  1.05    Ca    9.8      15 Sep 2023 22:16    TPro  7.9  /  Alb  4.8  /  TBili  1.0  /  DBili  x   /  AST  33  /  ALT  12  /  AlkPhos  50  09-15    PT/INR - ( 15 Sep 2023 22:16 )   PT: 10.3 sec;   INR: 0.90          PTT - ( 15 Sep 2023 22:16 )  PTT:33.3 sec  Urinalysis Basic - ( 15 Sep 2023 22:16 )    Color: x / Appearance: x / SG: x / pH: x  Gluc: 164 mg/dL / Ketone: x  / Bili: x / Urobili: x   Blood: x / Protein: x / Nitrite: x   Leuk Esterase: x / RBC: x / WBC x   Sq Epi: x / Non Sq Epi: x / Bacteria: x      CULTURES:      RADIOLOGY & ADDITIONAL STUDIES:    Assessment:  87yo Female with PMHX of HTN, HLD, T2DM, hx of TIA vs CVA ~2.5w ago (admitted to [place] and discharged on ASA) without residual deficits BIBEMS to [facility] ED as trasnfer from [facility] for L MCA stroke as possible thrombectomy candidate. LKW is today at 1300 when patient was speaking normally on the phone, later around 1400 patient was not picking up her phone, family was concerned and called neighbor for wellness check, was found to be unresponsive, in pool of urine with the smoke alarm going off. Patient was brought to [facility] where she was intubated for airway protection, on non-contrast CTH was found to have hyperdense left MCA sign, CTP and CTA H/N nondiagnostic due to poor contrast bolus however did note concern for hypopharynx injury 2/2 traumatic intubation. Pt transferred to [facility] for possible mechanical thrombectomy. On arrival to [facility] ED, patient intubated and sedated on propofol, , /80. After holding propofol for ~15 mins NIHSS 32, spontaneously moving LUE/LLE, localizing to noxious throughout left side, right UE 0/5, right LE tripleflexion to noxious. Delay in obtaining CT imaging due to stabilizing airway on ventilator and setting up ventilator settings. CTH with evidence of acute L MCA infarct, CTP with mismatch volume of 238cc with mismatch ratio 6.8 within L MCA territory, CTA H/N with left MCA bifurcation occlusion, free air in right carotid deep spaces likely due to traumatic intubation. Pt out of window for thrombolytic treatment. After discussion between Dr. Ontiveros and Dr. Plascencia, patient was deemed not a candidate for mechanical thrombectomy given ischemic changes along L MCA territory on CTH with large correlating core on CTP. Per family at bedside, patient a normally active and functional and ambulating multiple city blocks at a time. Prior to stroke 2-3 weeks before, patient otherwise fully functional, living alone with well controlled comorbidities. NSGY consulted for hemicrani watch      Plan:  - **Pending final discussion with Dr. Yu  - Repeat CTH in AM  - hold all AC  - Na goal 145-155  - Permissive hypertension <180  - Recommend GOC discussion/palliative      HISTORY OF PRESENT ILLNESS:     Last known well time: 1300 on 9/15    HPI: 87yo Female with PMHX of HTN, HLD, T2DM, hx of TIA vs CVA ~2.5w ago (admitted to [place] and discharged on ASA) without residual deficits BIBEMS to [facility] ED as trasnfer from [facility] for L MCA stroke as possible thrombectomy candidate. LKW is today at 1300 when patient was speaking normally on the phone, later around 1400 patient was not picking up her phone, family was concerned and called neighbor for wellness check, was found to be unresponsive, in pool of urine with the smoke alarm going off. Patient was brought to [facility] where she was intubated for airway protection, on non-contrast CTH was found to have hyperdense left MCA sign, CTP and CTA H/N nondiagnostic due to poor contrast bolus however did note concern for hypopharynx injury 2/2 traumatic intubation. Pt transferred to [facility] for possible mechanical thrombectomy.    On arrival to [facility] ED, patient intubated and sedated on propofol, , /80. After holding propofol for ~15 mins NIHSS 32, spontaneously moving LUE/LLE, localizing to noxious throughout left side, right UE 0/5, right LE tripleflexion to noxious. Delay in obtaining CT imaging due to stabilizing airway on ventilator and setting up ventilator settings. CTH with evidence of acute L MCA infarct, CTP with mismatch volume of 238cc with mismatch ratio 6.8 within L MCA territory, CTA H/N with left MCA bifurcation occlusion, free air in right carotid deep spaces likely due to traumatic intubation. Pt out of window for thrombolytic treatment. After discussion between Dr. Ontiveros and Dr. Plascencia, patient was deemed not a candidate for mechanical thrombectomy given ischemic changes along L MCA territory on CTH with large correlating core on CTP.     Per family at bedside, patient a normally active and functional and ambulating multiple city blocks at a time. Prior to stroke 2-3 weeks before, patient otherwise fully functional, living alone with well controlled comorbidities.     NSGY consulted for hemicrani watch    PAST MEDICAL & SURGICAL HISTORY: HTN, HLD, T2DM, TIA    FAMILY HISTORY:  unknown    SOCIAL HISTORY:  Patient lives with  in apartment   Smoking status: unknown  Drinking: unknown   Drug Use: unknown     Allergies    No Known Allergies    Intolerances        REVIEW OF SYSTEMS  Unable to obtain due to mental status	      MEDICATIONS:  Antibiotics:  cefTRIAXone   IVPB 2000 milliGRAM(s) IV Intermittent every 24 hours    Neuro:    Anticoagulation:    OTHER:  chlorhexidine 0.12% Liquid 15 milliLiter(s) Oral Mucosa every 12 hours  niCARdipine Infusion 5 mG/Hr IV Continuous <Continuous>    IVF:      Vital Signs Last 24 Hrs  T(C): 36.3 (15 Sep 2023 22:52), Max: 36.3 (15 Sep 2023 22:52)  T(F): 97.3 (15 Sep 2023 22:52), Max: 97.3 (15 Sep 2023 22:52)  HR: 89 (16 Sep 2023 01:10) (67 - 89)  BP: 145/73 (16 Sep 2023 01:10) (142/80 - 159/88)  BP(mean): 104 (16 Sep 2023 01:10) (104 - 104)  RR: 14 (16 Sep 2023 01:10) (14 - 16)  SpO2: 99% (16 Sep 2023 01:10) (97% - 99%)    Parameters below as of 16 Sep 2023 01:10  Patient On (Oxygen Delivery Method): ventilator    O2 Concentration (%): 40    PHYSICAL EXAM:    Physical exam while propofol held for 15 mins     Constitutional:  89 y/o female Intubated, eyes closed, not following commands   Eyes:  Sclera anicteric, conjunctiva noninjected.  ENMT: +intubated Oropharyngeal mucosa moist, pink. Tongue midline.    Neck: Neck supple, FROM.  No appreciable lymphadenopathy.  Back:  No pain to palpation/percussion of low back. No CVA tenderness.  Respiratory: +intubated, Clear to auscultation bilaterally.  No rales, rhonchi, wheezes.  Cardiovascular: Regular rate and rhythm.  S1, S2 heard.  Gastrointestinal:  Soft, nontender, nondistended.  +BS.  Genitourinary:  Deferred.  Rectal: Deferred.  Vascular: Extremities warm, no ulcers, no discoloration of skin.   Neurological: Gen: AA&O x 0, nonverbal, not following commands      CN II-XII - +oculocephalic reflex, pupils 3mm briskly reactive b/l to light, +L corneal, absent R corneal, +cough/gag.    Motor/Sensory: LUE/LLE moving spontaneously, RUE/RLE withdrawing to noxious    Pronator drift/hoffmans and DTRs not assessed  Skin: Warm, dry, no erythema.    LABS:                        15.6   14.52 )-----------( 255      ( 15 Sep 2023 22:16 )             46.0     09-15    136  |  101  |  22  ----------------------------<  164<H>  5.3   |  23  |  1.05    Ca    9.8      15 Sep 2023 22:16    TPro  7.9  /  Alb  4.8  /  TBili  1.0  /  DBili  x   /  AST  33  /  ALT  12  /  AlkPhos  50  09-15    PT/INR - ( 15 Sep 2023 22:16 )   PT: 10.3 sec;   INR: 0.90          PTT - ( 15 Sep 2023 22:16 )  PTT:33.3 sec  Urinalysis Basic - ( 15 Sep 2023 22:16 )    Color: x / Appearance: x / SG: x / pH: x  Gluc: 164 mg/dL / Ketone: x  / Bili: x / Urobili: x   Blood: x / Protein: x / Nitrite: x   Leuk Esterase: x / RBC: x / WBC x   Sq Epi: x / Non Sq Epi: x / Bacteria: x      CULTURES:      RADIOLOGY & ADDITIONAL STUDIES:    Assessment:  87yo Female with PMHX of HTN, HLD, T2DM, hx of TIA vs CVA ~2.5w ago (admitted to [place] and discharged on ASA) without residual deficits BIBEMS to [facility] ED as trasnfer from [facility] for L MCA stroke as possible thrombectomy candidate. LKW is today at 1300 when patient was speaking normally on the phone, later around 1400 patient was not picking up her phone, family was concerned and called neighbor for wellness check, was found to be unresponsive, in pool of urine with the smoke alarm going off. Patient was brought to [facility] where she was intubated for airway protection, on non-contrast CTH was found to have hyperdense left MCA sign, CTP and CTA H/N nondiagnostic due to poor contrast bolus however did note concern for hypopharynx injury 2/2 traumatic intubation. Pt transferred to [facility] for possible mechanical thrombectomy. On arrival to [facility] ED, patient intubated and sedated on propofol, , /80. After holding propofol for ~15 mins NIHSS 32, spontaneously moving LUE/LLE, localizing to noxious throughout left side, right UE 0/5, right LE tripleflexion to noxious. Delay in obtaining CT imaging due to stabilizing airway on ventilator and setting up ventilator settings. CTH with evidence of acute L MCA infarct, CTP with mismatch volume of 238cc with mismatch ratio 6.8 within L MCA territory, CTA H/N with left MCA bifurcation occlusion, free air in right carotid deep spaces likely due to traumatic intubation. Pt out of window for thrombolytic treatment. After discussion between Dr. Ontiveros and Dr. Plascencia, patient was deemed not a candidate for mechanical thrombectomy given ischemic changes along L MCA territory on CTH with large correlating core on CTP. Per family at bedside, patient a normally active and functional and ambulating multiple city blocks at a time. Prior to stroke 2-3 weeks before, patient otherwise fully functional, living alone with well controlled comorbidities. NSGY consulted for hemicrani watch      Plan:  - No acute neurosurgical intervention at this time  - C/w q1 neuro/vitals checks  - Repeat CTH in AM and re-scan patient with any changes in neurological status  - hold all AC  - Na goal 145-155  - Permissive hypertension  - Recommend GOC discussion/palliative     NSGY will continue to follow, please call back with any questions at [phone]

## 2023-09-16 NOTE — H&P ADULT - ASSESSMENT
89yo female with PMHx of HTN, HLD, T2DM presents to [facility] as a tx from [facility] i/s/o concern for large L sided MCA stroke. ICU consulted for further management.     Neuro  #MCA Stroke  #MCA Thrombus  Pt intubated @ [facility] i/s/o airway protection for suspected large L MCA stroke. CT brain stroke protocol showing opacified b/l MCAs L>R, cannot exclude thrombus. Per Neurosurgical team, CT brain perfusion showing infarction around MCA territory which is possibly too substantial to allow for adequate brain recovery even if intervention is done on thrombus.  Per Neurosurg, no plan for intervention on thrombus and MCA stroke expected to enlarge with larger infarction to be expected with possible cerebral edema however no edema witnessed on scans at this time.   - will wean sedation to see neurologic status -- patient spontaneously moving b/l LE and LUE however no movement seen in RUE; R pupil non-reactive; L pupil sluggish to corneal reflex  - strict BP control w/ SBP b/w 120-160 -- will start cardene gtt  - Na goal 145-155 i/s/o stroke -- bolus NaCl 3% 100cc and will recheck BMP -- will continue with 25cc/hr with q4hr BMPs per neurosurgery  - Palliative consulted i/s/o possible worsening infarction with no intervention -- family support needed       Cards  #Hx of HTN  #BP Control  Will exhibit strict BP control i/s/o stroke. BPs in ED labile ranging from -190s.   - start cardene gtt with titration goal of -160    Respiratory  #AHRF i/s/o MCA Stroke w/ Asp Pna   CT Chest showing signs of possible aspiration pna i/s/o intubation. WBC 12 however likely reactive i/s/o stroke. Afebrile.   - starting CTX in NaCl 2g IV q24hr  - will obtain sputum cx  - aspiration precautions   - continue to monitor     Renal  #Permissive Hypernatremia i/s/o MCA Infarct   Na 136 on admission. Per protocol, NaCl 3% 100cc bolus.   - check BMP upon arrival to MICU  - start gtt @ 25 cc/hr with q4hr BMPs to monitor Na -- goal 145-155    #Indwelling mathew  - monitor urine output -- placed in MICU    Heme  #Elevated Hgb  HGB 15 on admission likely i/s/o hemoconcentration.   - f/u repeat on AM labs    Endo  #Hx of T2DM  Hx of T2DM. A1c and home medications unknown.  - f/u A1c in AM  - started Kaiden     ID  #Asp Pna  - See Pulm as above    Prophylaxis:  - DVT: SCDs   - GI: none

## 2023-09-16 NOTE — CONSULT NOTE ADULT - ATTENDING COMMENTS
88 year old female with hx of DM2, HTN, HLD presents to the ER from outside hospital after she was found down at home. Patient is a highly functioning 88 year old with recent stroke like symptoms earlier this month which she was discharged on aspirin. 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 Leading to transfer to [facility] hospital for neurosurgical evaluation.    Patient intubated and sedated at time of examination with sluggish pupil on the R but good response on the L. Moves bilateral LE spontaneously as well as bilateral upper extremities. After discussion with neurosurgery service there is no surgical intervention at this time as she appears to have large area of stroke. Anticipate that ICP will increase as the brain swells from the stroke. Given this will start her on hypersaline therapy to reduce ICP and increase sodium goal 145 -155. Ventilator set at goal with PCO2 of 36 and will control BP to less 160 systolic and diastolic less 100. EEG and wean sedation to monitor neurostatus and see if she can wake up. She does have WBC elevation but no consolidation on US with A line patten and normal cardiac function, will hold antibiotics for now.    Plan:  - EEG monitoring  - 3%salinewith goal sodium 145-155  - BP systolic less 160 and diastolic less 100, cardine drip if needed  - Wean sedation allow her to wake up  - Neurosurgery consulted  - Monitor off antibiotics for now

## 2023-09-16 NOTE — CONSULT NOTE ADULT - ASSESSMENT
87yo female with PMHx of HTN, HLD, T2DM presents to [facility] as a tx from [facility] i/s/o concern for large L sided MCA stroke. ICU consulted for further management.     Neuro  #MCA Stroke  #MCA Thrombus  Pt intubated @ [facility] i/s/o airway protection for suspected large L MCA stroke. CT brain stroke protocol showing opacified b/l MCAs L>R, cannot exclude thrombus. Per Neurosurgical team, CT brain perfusion showing infarction around MCA territory which is possibly too substantial to allow for adequate brain recovery even if intervention is done on thrombus.  Per Neurosurg, no plan for intervention on thrombus and MCA stroke expected to enlarge with larger infarction to be expected with possible cerebral edema however no edema witnessed on scans at this time.   - will wean sedation to see neurologic status -- patient spontaneously moving b/l LE and LUE however no movement seen in RUE; R pupil non-reactive; L pupil sluggish to corneal reflex  - strict BP control w/ SBP b/w 120-160 -- will start cardene gtt  - Na goal 145-155 i/s/o stroke -- bolus NaCl 3% 100cc and will recheck BMP -- will continue with 25cc/hr with q4hr BMPs per neurosurgery  - Palliative consulted i/s/o possible worsening infarction with no intervention -- family support needed       Cards  #Hx of HTN  #BP Control  Will exhibit strict BP control i/s/o stroke. BPs in ED labile ranging from -190s.   - start cardene gtt with titration goal of -160    Respiratory  #AHRF i/s/o MCA Stroke w/ Asp Pna   CT Chest showing signs of possible aspiration pna i/s/o intubation. WBC 12 however likely reactive i/s/o stroke. Afebrile.   - starting CTX in NaCl 2g IV q24hr  - will obtain sputum cx  - aspiration precautions   - continue to monitor     Renal  #Permissive Hypernatremia i/s/o MCA Infarct   Na 136 on admission. Per protocol, NaCl 3% 100cc bolus.   - check BMP upon arrival to MICU  - start gtt @ 25 cc/hr with q4hr BMPs to monitor Na -- goal 145-155    #Indwelling mathew  - monitor urine output -- placed in MICU    Heme  #Elevated Hgb  HGB 15 on admission likely i/s/o hemoconcentration.   - f/u repeat on AM labs    Endo  #Hx of T2DM  Hx of T2DM. A1c and home medications unknown.  - f/u A1c in AM  - started Kaiden     ID  #Asp Pna  - See Pulm as above    Prophylaxis:  - DVT: SCDs   - GI: none     Dispo: MICU  Discussed with Intensivist Dr. Mancera

## 2023-09-16 NOTE — PROGRESS NOTE ADULT - ASSESSMENT
89yo female with PMHx of HTN, HLD, T2DM presents to [facility] as a tx from [facility] i/s/o concern for large L sided MCA stroke. ICU consulted for further management.     Neuro  #MCA Stroke  #MCA Thrombus  Pt intubated @ [facility] i/s/o airway protection for suspected large L MCA stroke. CT brain stroke protocol showing opacified b/l MCAs L>R, cannot exclude thrombus. Per Neurosurgical team, CT brain perfusion showing infarction around MCA territory which is possibly too substantial to allow for adequate brain recovery even if intervention is done on thrombus.  Per Neurosurg, no plan for intervention on thrombus and MCA stroke expected to enlarge with larger infarction to be expected with possible cerebral edema however no edema witnessed on scans at this time.   - strict BP control w/ SBP b/w 120-160 -- will start cardene gtt  - Na goal 145-155 i/s/o stroke -- bolus NaCl 3% 100cc and will recheck BMP -- will continue with 25cc/hr with q4hr BMPs per neurosurgery  - No ASA per neurosurgery  - Palliative consulted i/s/o possible worsening infarction with no intervention -- family support needed       Cards  #Hx of HTN  #BP Control  Will exhibit strict BP control i/s/o stroke. BPs in ED labile ranging from -190s.   - start cardene gtt with titration goal of -160    Respiratory  #AHRF i/s/o MCA Stroke w/ Asp PNA  CT Chest showing signs of possible aspiration pna i/s/o intubation. WBC 12 however likely reactive i/s/o stroke. Afebrile.   - starting CTX in NaCl 2g IV q24hr  - will obtain sputum cx  - aspiration precautions   - continue to monitor     Renal  #Permissive Hypernatremia i/s/o MCA Infarct   Na 136 on admission. Per protocol, NaCl 3% 100cc bolus.   - check BMP upon arrival to MICU  - start gtt @ 25 cc/hr with q4hr BMPs to monitor Na -- goal 145-155    #Indwelling mathew  - monitor urine output -- placed in MICU    Heme  #Elevated Hgb  HGB 15 on admission likely i/s/o hemoconcentration.   - f/u repeat on AM labs    Endo  #Hx of T2DM  Hx of T2DM. A1c and home medications unknown.  - f/u A1c in AM  - started Kaiden     ID  #Asp Pna  - See Pulm as above    Prophylaxis:  - DVT: SCDs   - GI: none

## 2023-09-16 NOTE — PATIENT PROFILE ADULT - FALL HARM RISK - HARM RISK INTERVENTIONS

## 2023-09-17 NOTE — PROGRESS NOTE ADULT - ASSESSMENT
87yo female with PMHx of HTN, HLD, T2DM presents to [facility] as a tx from [facility] i/s/o concern for large L sided MCA stroke. ICU consulted for further management.     Neuro  #MCA Stroke  #MCA Thrombus  Pt intubated @ [facility] i/s/o airway protection for suspected large L MCA stroke. CT brain stroke protocol showing opacified b/l MCAs L>R, cannot exclude thrombus. Per Neurosurgical team, CT brain perfusion showing infarction around MCA territory which is possibly too substantial to allow for adequate brain recovery even if intervention is done on thrombus.  Per Neurosurg, no plan for intervention on thrombus and MCA stroke expected to enlarge with larger infarction to be expected with possible cerebral edema however no edema witnessed on scans at this time.   - will wean sedation to see neurologic status -- patient spontaneously moving b/l LE and LUE however no movement seen in RUE;  - strict BP control w/ SBP <180  - Na goal 145-155 i/s/o stroke -- bolus NaCl 3% 100cc and will recheck BMP -- will continue with 25cc/hr with BID BMPs per neurosurgery  - No ASA per neurosurgery  - Palliative consulted i/s/o possible worsening infarction with no intervention -- family support needed   - As per Neuro, Non con Head CT tomorrow    Cards  #Afib  New onset Afib with HR 120s. Patient bradycardiac over last two days  - given diltiazem 5mg    #Hx of HTN  #BP Control  Will exhibit strict BP control i/s/o stroke. BPs in ED labile ranging from -190s.     Respiratory  #AHRF i/s/o MCA Stroke w/ Asp Pna   CT Chest showing signs of possible aspiration pna i/s/o intubation. WBC 12 however likely reactive i/s/o stroke. Afebrile.   - will obtain sputum cx  - aspiration precautions   - continue to monitor     Renal  #Permissive Hypernatremia i/s/o MCA Infarct   Na 136 on admission. Per protocol, NaCl 3% 100cc bolus.   - check BMP upon arrival to MICU  - start gtt @ 25 cc/hr with q4hr BMPs to monitor Na -- goal 145-155    #Indwelling mathew  - monitor urine output -- placed in MICU    Heme  #Elevated Hgb  HGB 15 on admission likely i/s/o hemoconcentration.   - f/u repeat on AM labs    Endo  #Hx of T2DM  Hx of T2DM. A1c and home medications unknown.  - f/u A1c in AM  - started Kaiden     ID  MICHELLE    Prophylaxis:  - DVT: SCDs   - GI: none

## 2023-09-17 NOTE — PROGRESS NOTE ADULT - SUBJECTIVE AND OBJECTIVE BOX
Neurology Stroke Progress Note    INTERVAL HPI/OVERNIGHT EVENTS:  Patient seen and examined. Patient remains intubated, unable to tolerate CPAP this morning.     MEDICATIONS  (STANDING):  atorvastatin 80 milliGRAM(s) Oral at bedtime  chlorhexidine 0.12% Liquid 15 milliLiter(s) Oral Mucosa every 12 hours  chlorhexidine 2% Cloths 1 Application(s) Topical <User Schedule>  dextrose 5%. 1000 milliLiter(s) (50 mL/Hr) IV Continuous <Continuous>  dextrose 5%. 1000 milliLiter(s) (100 mL/Hr) IV Continuous <Continuous>  dextrose 50% Injectable 25 Gram(s) IV Push once  dextrose 50% Injectable 25 Gram(s) IV Push once  dextrose 50% Injectable 12.5 Gram(s) IV Push once  glucagon  Injectable 1 milliGRAM(s) IntraMuscular once  insulin lispro (ADMELOG) corrective regimen sliding scale   SubCutaneous three times a day before meals  sodium chloride 3%. 500 milliLiter(s) (25 mL/Hr) IV Continuous <Continuous>    MEDICATIONS  (PRN):  dextrose Oral Gel 15 Gram(s) Oral once PRN Blood Glucose LESS THAN 70 milliGRAM(s)/deciliter      Allergies    No Known Allergies    Intolerances        Vital Signs Last 24 Hrs  T(C): 37.2 (17 Sep 2023 07:41), Max: 37.7 (16 Sep 2023 18:41)  T(F): 99 (17 Sep 2023 07:41), Max: 99.9 (16 Sep 2023 18:41)  HR: 52 (17 Sep 2023 08:00) (50 - 98)  BP: 174/74 (17 Sep 2023 08:00) (121/84 - 174/74)  BP(mean): 106 (17 Sep 2023 08:00) (90 - 111)  RR: 28 (17 Sep 2023 08:00) (16 - 28)  SpO2: 100% (17 Sep 2023 08:00) (99% - 100%)    Parameters below as of 17 Sep 2023 08:00  Patient On (Oxygen Delivery Method): ventilator    O2 Concentration (%): 40    Neurologic:  -Mental status: Does not open eyes to voice or noxious stimuli, does not follow commands or track, intubated   -Cranial nerves:   II: Blink to threat absent bilaterally  III, IV, VI: Pupils equally round and reactive to light  VII: Right facial droop   Motor/Sensation: Right upper extremity at least 2/5, right lower extremity with triple flexion, 2/5. Left upper extremity and lower extremity 2/5, not localizing to pain on left side as briskly as yesterday.  Reflexes: Upgoing toes bilaterally   LABS:                        13.4   14.74 )-----------( 189      ( 17 Sep 2023 06:14 )             40.0     09-17    147<H>  |  119<H>  |  31<H>  ----------------------------<  175<H>  4.2   |  21<L>  |  0.86    Ca    9.0      17 Sep 2023 06:14  Phos  3.1     09-17  Mg     2.3     09-17    TPro  6.6  /  Alb  3.7  /  TBili  0.6  /  DBili  x   /  AST  28  /  ALT  8<L>  /  AlkPhos  46  09-17    PT/INR - ( 15 Sep 2023 22:16 )   PT: 10.3 sec;   INR: 0.90          PTT - ( 15 Sep 2023 22:16 )  PTT:33.3 sec  Urinalysis Basic - ( 17 Sep 2023 06:14 )    Color: x / Appearance: x / SG: x / pH: x  Gluc: 175 mg/dL / Ketone: x  / Bili: x / Urobili: x   Blood: x / Protein: x / Nitrite: x   Leuk Esterase: x / RBC: x / WBC x   Sq Epi: x / Non Sq Epi: x / Bacteria: x        RADIOLOGY & ADDITIONAL TESTS:  < from: CT Head No Cont (09.16.23 @ 13:46) >  IMPRESSION:    1.  Redemonstrated infarct in the left MCA distribution, more evolved   when compared prior imaging. There is a bright left MCA sign in the   distal left M1/proximal M2 segment, compatible with a probable thrombus.  2.  Decreased gray-white matter discrimination in the inferior left   temporal lobe and inferior left occipital lobe, more conspicuous when   compared prior imaging. Finding is likely compatible with a developing   left PCA distribution infarct. Given findings of multiple vascular   distributions, a embolic source should be considered.  3.  Faint increased attenuation along the sulci of the superiormargin of   the infarcted left MCA territory. This could represent small petechial   hemorrhages versus trace subarachnoid blood.    < end of copied text >     Neurology Stroke Progress Note    INTERVAL HPI/OVERNIGHT EVENTS:  Patient seen and examined. Patient remains intubated. Will potentially attempt to extubate this morning.     MEDICATIONS  (STANDING):  atorvastatin 80 milliGRAM(s) Oral at bedtime  chlorhexidine 0.12% Liquid 15 milliLiter(s) Oral Mucosa every 12 hours  chlorhexidine 2% Cloths 1 Application(s) Topical <User Schedule>  dextrose 5%. 1000 milliLiter(s) (50 mL/Hr) IV Continuous <Continuous>  dextrose 5%. 1000 milliLiter(s) (100 mL/Hr) IV Continuous <Continuous>  dextrose 50% Injectable 25 Gram(s) IV Push once  dextrose 50% Injectable 25 Gram(s) IV Push once  dextrose 50% Injectable 12.5 Gram(s) IV Push once  glucagon  Injectable 1 milliGRAM(s) IntraMuscular once  insulin lispro (ADMELOG) corrective regimen sliding scale   SubCutaneous three times a day before meals  sodium chloride 3%. 500 milliLiter(s) (25 mL/Hr) IV Continuous <Continuous>    MEDICATIONS  (PRN):  dextrose Oral Gel 15 Gram(s) Oral once PRN Blood Glucose LESS THAN 70 milliGRAM(s)/deciliter      Allergies    No Known Allergies    Intolerances        Vital Signs Last 24 Hrs  T(C): 37.2 (17 Sep 2023 07:41), Max: 37.7 (16 Sep 2023 18:41)  T(F): 99 (17 Sep 2023 07:41), Max: 99.9 (16 Sep 2023 18:41)  HR: 52 (17 Sep 2023 08:00) (50 - 98)  BP: 174/74 (17 Sep 2023 08:00) (121/84 - 174/74)  BP(mean): 106 (17 Sep 2023 08:00) (90 - 111)  RR: 28 (17 Sep 2023 08:00) (16 - 28)  SpO2: 100% (17 Sep 2023 08:00) (99% - 100%)    Parameters below as of 17 Sep 2023 08:00  Patient On (Oxygen Delivery Method): ventilator    O2 Concentration (%): 40    Neurologic:  -Mental status: Does not open eyes to voice or noxious stimuli, does not follow commands or track, intubated   -Cranial nerves:   II: Blink to threat absent bilaterally  III, IV, VI: Pupils equally round and reactive to light  VII: Right facial droop   Motor/Sensation: Right upper extremity at least 2/5, right lower extremity with triple flexion, 2/5. Left upper extremity and lower extremity 2/5, not localizing to pain on left side as briskly as yesterday.  Reflexes: Upgoing toes bilaterally   LABS:                        13.4   14.74 )-----------( 189      ( 17 Sep 2023 06:14 )             40.0     09-17    147<H>  |  119<H>  |  31<H>  ----------------------------<  175<H>  4.2   |  21<L>  |  0.86    Ca    9.0      17 Sep 2023 06:14  Phos  3.1     09-17  Mg     2.3     09-17    TPro  6.6  /  Alb  3.7  /  TBili  0.6  /  DBili  x   /  AST  28  /  ALT  8<L>  /  AlkPhos  46  09-17    PT/INR - ( 15 Sep 2023 22:16 )   PT: 10.3 sec;   INR: 0.90          PTT - ( 15 Sep 2023 22:16 )  PTT:33.3 sec  Urinalysis Basic - ( 17 Sep 2023 06:14 )    Color: x / Appearance: x / SG: x / pH: x  Gluc: 175 mg/dL / Ketone: x  / Bili: x / Urobili: x   Blood: x / Protein: x / Nitrite: x   Leuk Esterase: x / RBC: x / WBC x   Sq Epi: x / Non Sq Epi: x / Bacteria: x        RADIOLOGY & ADDITIONAL TESTS:  < from: CT Head No Cont (09.16.23 @ 13:46) >  IMPRESSION:    1.  Redemonstrated infarct in the left MCA distribution, more evolved   when compared prior imaging. There is a bright left MCA sign in the   distal left M1/proximal M2 segment, compatible with a probable thrombus.  2.  Decreased gray-white matter discrimination in the inferior left   temporal lobe and inferior left occipital lobe, more conspicuous when   compared prior imaging. Finding is likely compatible with a developing   left PCA distribution infarct. Given findings of multiple vascular   distributions, a embolic source should be considered.  3.  Faint increased attenuation along the sulci of the superiormargin of   the infarcted left MCA territory. This could represent small petechial   hemorrhages versus trace subarachnoid blood.    < end of copied text >     Neurology Stroke Progress Note    INTERVAL HPI/OVERNIGHT EVENTS:  Patient seen and examined. Patient remains intubated. Will potentially attempt to extubate this morning.     MEDICATIONS  (STANDING):  atorvastatin 80 milliGRAM(s) Oral at bedtime  chlorhexidine 0.12% Liquid 15 milliLiter(s) Oral Mucosa every 12 hours  chlorhexidine 2% Cloths 1 Application(s) Topical <User Schedule>  dextrose 5%. 1000 milliLiter(s) (50 mL/Hr) IV Continuous <Continuous>  dextrose 5%. 1000 milliLiter(s) (100 mL/Hr) IV Continuous <Continuous>  dextrose 50% Injectable 25 Gram(s) IV Push once  dextrose 50% Injectable 25 Gram(s) IV Push once  dextrose 50% Injectable 12.5 Gram(s) IV Push once  glucagon  Injectable 1 milliGRAM(s) IntraMuscular once  insulin lispro (ADMELOG) corrective regimen sliding scale   SubCutaneous three times a day before meals  sodium chloride 3%. 500 milliLiter(s) (25 mL/Hr) IV Continuous <Continuous>    MEDICATIONS  (PRN):  dextrose Oral Gel 15 Gram(s) Oral once PRN Blood Glucose LESS THAN 70 milliGRAM(s)/deciliter      Allergies    No Known Allergies    Intolerances        Vital Signs Last 24 Hrs  T(C): 37.2 (17 Sep 2023 07:41), Max: 37.7 (16 Sep 2023 18:41)  T(F): 99 (17 Sep 2023 07:41), Max: 99.9 (16 Sep 2023 18:41)  HR: 52 (17 Sep 2023 08:00) (50 - 98)  BP: 174/74 (17 Sep 2023 08:00) (121/84 - 174/74)  BP(mean): 106 (17 Sep 2023 08:00) (90 - 111)  RR: 28 (17 Sep 2023 08:00) (16 - 28)  SpO2: 100% (17 Sep 2023 08:00) (99% - 100%)    Parameters below as of 17 Sep 2023 08:00  Patient On (Oxygen Delivery Method): ventilator    O2 Concentration (%): 40    Neurologic:  -Mental status: Does not track examiner. Appears to attempt to open eyes to strong noxious stimuli and attempts to wiggle toes on right foot to command when asked to in [place]  -Cranial nerves:   II: Blink to threat absent bilaterally  III, IV, VI: Pupils equally round and reactive to light  VII: Right facial droop   Motor/Sensation: Right upper extremity at least 3+/5 distally, right lower extremity with bending at the knee when noxious stimuli applied, 3/5. Left upper extremity and lower extremities at least 3/5 with localization to noxious stimuli applied to left lower extremity with left upper extremity    LABS:                        13.4   14.74 )-----------( 189      ( 17 Sep 2023 06:14 )             40.0     09-17    147<H>  |  119<H>  |  31<H>  ----------------------------<  175<H>  4.2   |  21<L>  |  0.86    Ca    9.0      17 Sep 2023 06:14  Phos  3.1     09-17  Mg     2.3     09-17    TPro  6.6  /  Alb  3.7  /  TBili  0.6  /  DBili  x   /  AST  28  /  ALT  8<L>  /  AlkPhos  46  09-17    PT/INR - ( 15 Sep 2023 22:16 )   PT: 10.3 sec;   INR: 0.90          PTT - ( 15 Sep 2023 22:16 )  PTT:33.3 sec  Urinalysis Basic - ( 17 Sep 2023 06:14 )    Color: x / Appearance: x / SG: x / pH: x  Gluc: 175 mg/dL / Ketone: x  / Bili: x / Urobili: x   Blood: x / Protein: x / Nitrite: x   Leuk Esterase: x / RBC: x / WBC x   Sq Epi: x / Non Sq Epi: x / Bacteria: x        RADIOLOGY & ADDITIONAL TESTS:  < from: CT Head No Cont (09.16.23 @ 13:46) >  IMPRESSION:    1.  Redemonstrated infarct in the left MCA distribution, more evolved   when compared prior imaging. There is a bright left MCA sign in the   distal left M1/proximal M2 segment, compatible with a probable thrombus.  2.  Decreased gray-white matter discrimination in the inferior left   temporal lobe and inferior left occipital lobe, more conspicuous when   compared prior imaging. Finding is likely compatible with a developing   left PCA distribution infarct. Given findings of multiple vascular   distributions, a embolic source should be considered.  3.  Faint increased attenuation along the sulci of the superiormargin of   the infarcted left MCA territory. This could represent small petechial   hemorrhages versus trace subarachnoid blood.    < end of copied text >

## 2023-09-17 NOTE — PROGRESS NOTE ADULT - ASSESSMENT
89yo Female with PMHX of HTN, HLD, T2DM, hx of TIA vs CVA ~2.5w ago (admitted to [facility] and discharged on ASA) without residual deficits BIBEMS to [facility] ED as trasnfer from [facility] for L MCA stroke as possible thrombectomy candidate. LKW is today at 1300 when patient was speaking normally on the phone, later around 1400 patient was not picking up her phone, family was concerned and called neighbor for wellness check, was found to be unresponsive, in pool of urine with the smoke alarm going off. Patient was brought to [facility] where she was intubated for airway protection, on non-contrast CTH was found to have hyperdense left MCA sign, CTP and CTA H/N nondiagnostic due to poor contrast bolus however did note concern for hypopharynx injury 2/2 traumatic intubation. Pt transferred to [facility] for possible mechanical thrombectomy. On arrival to [facility] ED, patient intubated and sedated on propofol, , /80. After holding propofol for ~15 mins NIHSS 32. CTH with evidence of acute L MCA infarct, CTP with mismatch volume of 238cc with mismatch ratio 6.8 within L MCA territory, CTA H/N with left MCA bifurcation occlusion, free air in right carotid deep spaces likely due to traumatic intubation. Pt out of window for thrombolytic treatment. After discussion between Dr. Ontiveros and Dr. Plascencia, patient was deemed not a candidate for mechanical thrombectomy given ischemic changes along L MCA territory on CTH with large correlating core on CTP.     Stroke etiology likely embolic source (cardioembolic vs hypercoagulable state)     Recommend    1)Secondary stroke prevention  - hold AC/AP at this time due to increased risk of hemorrhagic conversion given large infarct and possible small petechial hemorrhage vs. SAH  - continue Atorvastatin 80mg PO daily     2) Stroke risk factors  - A1C: 6.4%  - LDL: 72  - TSH: 0.420  - Afib    3) Further management  - recommend GOC discussion and palliative care consult; patient's family states they do not believe that patient has discussed DNR/DNI status  - recommend repeat NCHCT today due to worsening exam and to assist with prognostication   - obtain collateral from [facility]   - Na goal 145-155 to prevent interval intracranial edema   - recommend SBP goal <180  - recommend q1hr coma checks and vitals   - provide stroke education    Discussed with Neurology Attending Dr. Nisa Galloway   87yo Female with PMHX of HTN, HLD, T2DM, hx of TIA vs CVA ~2.5w ago (admitted to [facility] and discharged on ASA) without residual deficits BIBEMS to [facility] ED as trasnfer from [facility] for L MCA stroke as possible thrombectomy candidate. LKW is today at 1300 when patient was speaking normally on the phone, later around 1400 patient was not picking up her phone, family was concerned and called neighbor for wellness check, was found to be unresponsive, in pool of urine with the smoke alarm going off. Patient was brought to [facility] where she was intubated for airway protection, on non-contrast CTH was found to have hyperdense left MCA sign, CTP and CTA H/N nondiagnostic due to poor contrast bolus however did note concern for hypopharynx injury 2/2 traumatic intubation. Pt transferred to [facility] for possible mechanical thrombectomy. On arrival to [facility] ED, patient intubated and sedated on propofol, , /80. After holding propofol for ~15 mins NIHSS 32. CTH with evidence of acute L MCA infarct, CTP with mismatch volume of 238cc with mismatch ratio 6.8 within L MCA territory, CTA H/N with left MCA bifurcation occlusion, free air in right carotid deep spaces likely due to traumatic intubation. Pt out of window for thrombolytic treatment. After discussion between Dr. Ontiveros and Dr. Plascencia, patient was deemed not a candidate for mechanical thrombectomy given ischemic changes along L MCA territory on CTH with large correlating core on CTP.     Stroke etiology likely embolic source (cardioembolic vs hypercoagulable state)     Recommend    1)Secondary stroke prevention  - hold AC/AP at this time due to increased risk of hemorrhagic conversion given large infarct and possible small petechial hemorrhage vs. SAH  - continue Atorvastatin 80mg PO daily     2) Stroke risk factors  - A1C: 6.4%  - LDL: 72  - TSH: 0.420  - Afib    3) Further management  - recommend GOC discussion and palliative care consult; patient's family states they do not believe that patient has discussed DNR/DNI status  - recommend repeat NCHCT today, approaching peak swelling time and will assist with prognostication   - obtain collateral from [facility]   - Na goal 145-155 to prevent interval intracranial edema   - recommend SBP goal <180  - recommend q1hr coma checks and vitals   - provide stroke education    Discussed with Neurology Attending Dr. Nisa Galloway   87yo Female with PMHX of HTN, HLD, T2DM, hx of TIA vs CVA ~2.5w ago (admitted to [facility] and discharged on ASA) without residual deficits BIBEMS to [facility] ED as trasnfer from [facility] for L MCA stroke as possible thrombectomy candidate. LKW is today at 1300 when patient was speaking normally on the phone, later around 1400 patient was not picking up her phone, family was concerned and called neighbor for wellness check, was found to be unresponsive, in pool of urine with the smoke alarm going off. Patient was brought to [facility] where she was intubated for airway protection, on non-contrast CTH was found to have hyperdense left MCA sign, CTP and CTA H/N nondiagnostic due to poor contrast bolus however did note concern for hypopharynx injury 2/2 traumatic intubation. Pt transferred to [facility] for possible mechanical thrombectomy. On arrival to [facility] ED, patient intubated and sedated on propofol, , /80. After holding propofol for ~15 mins NIHSS 32. CTH with evidence of acute L MCA infarct, CTP with mismatch volume of 238cc with mismatch ratio 6.8 within L MCA territory, CTA H/N with left MCA bifurcation occlusion, free air in right carotid deep spaces likely due to traumatic intubation. Pt out of window for thrombolytic treatment. After discussion between Dr. Ontiveros and Dr. Plascencia, patient was deemed not a candidate for mechanical thrombectomy given ischemic changes along L MCA territory on CTH with large correlating core on CTP.     Stroke etiology likely embolic source (cardioembolic vs hypercoagulable state)     Recommend    1)Secondary stroke prevention  - hold AC/AP at this time due to increased risk of hemorrhagic conversion given large infarct and possible small petechial hemorrhage vs. SAH  - continue Atorvastatin 80mg PO daily     2) Stroke risk factors  - A1C: 6.4%  - LDL: 72  - TSH: 0.420  - Afib    3) Further management  - recommend GOC discussion and palliative care consult; patient's family states they do not believe that patient has discussed DNR/DNI status  - recommend repeat NCHCT tomorrow, 9/18; approaching peak swelling time and will assist with prognostication   - obtain collateral from [facility]   - Na goal 145-155 to prevent interval intracranial edema   - recommend SBP goal <180  - recommend q1hr coma checks and vitals   - provide stroke education    Discussed with Neurology Attending Dr. Nisa Galloway

## 2023-09-17 NOTE — EEG REPORT - NS EEG TEXT BOX
[facility] Department of Neurology  Inpatient Continuous video-Electroencephalogram    Patient Name:	AMANDA JESSICA    :	1934  MRN:	[id number]    Study Start Date/Time:  2023, 3:14:00 AM  Study End Date/Time: in progress    Referred by: Dr. Frey/Dr. Ontiveros    Brief Clinical History:  AMANDA JESSICA is a 88 year old Female with L MCA stroke; study performed to investigate for seizures or markers of epilepsy.    Diagnosis Code:   R56.9 convulsions/seizure  The live video was: unmonitored.    Pertinent Medications:  n/a    Acquisition Details:  Electroencephalography was acquired using a minimum of 21 channels on an [employer] system v 9.3.1 with electrode placement according to the standard International 10-20 system following ACNS (American Clinical Neurophysiology Society) guidelines for Long-Term Video EEG monitoring.  Anterior temporal T1 and T2 electrodes were utilized whenever possible.   The [employer] automated spike & seizure detections were all reviewed in detail, in addition to extensive portions of raw EEG.    Daily Updates (from 07:00 am until 07:00 am):  Day 2  -2023:   Background:  continuous, with predominantly alpha and beta frequencies over the right hemisphere and theta-delta frequencies over the left hemisphere.  Symmetry:  continuous polymorphic delta slowing was present over the left frontotemporal areas, at times sharply contoured.   Posterior Dominant Rhythm:  10 Hz, better formed over the right.   Organization: Appropriate anterior-posterior gradient.  Voltage:  Normal (20+ uV)  Variability: Yes. 		Reactivity: Yes.  N2 sleep: Symmetric, synchronous spindles and K complexes.  Spontaneous Activity:  No epileptiform discharges.  Periodic/rhythmic activity:  None  Events:  No electrographic seizures or significant clinical events.  Provocations:  Hyperventilation and Photic stimulation: was not performed.    Daily Summary:    Unchanged study indicative of focal slowing suggestive of focal cerebral dysfunction in the left frontotemporal areas.    May consider d/c VEEG if low suspicion for seizure.    Johana Lewis DO  Attending Neurologist, [facility]

## 2023-09-17 NOTE — PROGRESS NOTE ADULT - SUBJECTIVE AND OBJECTIVE BOX
HOSPITAL COURSE:  87yo female with well controlled HTN, HLD and T2DM presents to ED as a tx from [facility] i/s/o suspected MCA stroke. Pt had stroke-like symptoms a couple of weeks prior with no significant deficits noted at that time and patient was DCed home on aspirin. Today, patient not answering phone and found down by neighbor in wellness check in pool of urine.  BIBEMS to [facility] where she was intubated for airway protection and then subsequently transferred to [facility] for possible neurosurgical intervention. Per family at bedside, patient a normally active and functional and ambulating multiple city blocks at a time. Prior to stroke 2-3 weeks before, patient otherwise fully functional, living alone with well controlled comorbidities. CT of the head - infarct in MCA distribution,   developing left PCA distribution infarct. Patient given hypertonic saline with NA goal 145-155 to prevent interval intracranial edema. SBP goal <180.     INTERVAL HPI/OVERNIGHT EVENTS:   No overnight events. MN Na 145, continued 3%. AM Na 147  Afebrile, hemodynamically stable     Subjective: Patient seen and examined at bedside. Stroke rec repeat CT head tomorrow. Family not interested in transfer at this time.  Afib 120s, diltiazem IVP 5mg x1 given. Patient responsive to painful stimuli.    ICU Vital Signs Last 24 Hrs  T(C): 37.5 (17 Sep 2023 14:11), Max: 37.7 (16 Sep 2023 18:41)  T(F): 99.5 (17 Sep 2023 14:11), Max: 99.9 (16 Sep 2023 18:41)  HR: 119 (17 Sep 2023 13:00) (47 - 119)  BP: 161/83 (17 Sep 2023 13:00) (123/67 - 174/74)  BP(mean): 111 (17 Sep 2023 13:00) (90 - 111)  ABP: --  ABP(mean): --  RR: 25 (17 Sep 2023 13:00) (16 - 32)  SpO2: 100% (17 Sep 2023 13:00) (100% - 100%)    O2 Parameters below as of 17 Sep 2023 13:00  Patient On (Oxygen Delivery Method): ventilator, AC mode    O2 Concentration (%): 40      I&O's Summary    16 Sep 2023 07:01  -  17 Sep 2023 07:00  --------------------------------------------------------  IN: 575 mL / OUT: 637 mL / NET: -62 mL    17 Sep 2023 07:01  -  17 Sep 2023 15:13  --------------------------------------------------------  IN: 150 mL / OUT: 175 mL / NET: -25 mL      Mode: CPAP with PS  FiO2: 40  PEEP: 5  PS: 5  MAP: 6.8  PIP: 10    PHYSICAL EXAM:  General: thin; frail  HEENT: NC/AT; PERRL  Neck: intubated   Cardiovascular: RRR, +S1/S2; NO M/R/G  Respiratory: CTA B/L; no W/R/R  Gastrointestinal: soft, NT/ND; +BSx4  Extremities: WWP; no edema or cyanosis  Vascular: 2+ radial, DP/PT pulses B/L  Neurological: AAOx0; intubated. Pt occasionally opens eyes and tracks. Withdraws to painful stimuli in 3/4 extremities (not R arm)    LABS:                        13.4   14.74 )-----------( 189      ( 17 Sep 2023 06:14 )             40.0     09-17    147<H>  |  119<H>  |  31<H>  ----------------------------<  175<H>  4.2   |  21<L>  |  0.86    Ca    9.0      17 Sep 2023 06:14  Phos  3.1     09-17  Mg     2.3     09-17    TPro  6.6  /  Alb  3.7  /  TBili  0.6  /  DBili  x   /  AST  28  /  ALT  8<L>  /  AlkPhos  46  09-17    PT/INR - ( 15 Sep 2023 22:16 )   PT: 10.3 sec;   INR: 0.90        PTT - ( 15 Sep 2023 22:16 )  PTT:33.3 sec  Urinalysis Basic - ( 17 Sep 2023 06:14 )    Color: x / Appearance: x / SG: x / pH: x  Gluc: 175 mg/dL / Ketone: x  / Bili: x / Urobili: x   Blood: x / Protein: x / Nitrite: x   Leuk Esterase: x / RBC: x / WBC x   Sq Epi: x / Non Sq Epi: x / Bacteria: x    CAPILLARY BLOOD GLUCOSE    POCT Blood Glucose.: 167 mg/dL (17 Sep 2023 10:51)  POCT Blood Glucose.: 151 mg/dL (17 Sep 2023 07:01)  POCT Blood Glucose.: 150 mg/dL (16 Sep 2023 16:19)    ABG - ( 16 Sep 2023 02:18 )  pH, Arterial: 7.37  pH, Blood: x     /  pCO2: 36    /  pO2: 180   / HCO3: 21    / Base Excess: -3.9  /  SaO2: 99.7      Consultant(s) Notes Reviewed:  [x ] YES  [ ] NO    MEDICATIONS  (STANDING):  atorvastatin 80 milliGRAM(s) Oral at bedtime  chlorhexidine 0.12% Liquid 15 milliLiter(s) Oral Mucosa every 12 hours  chlorhexidine 2% Cloths 1 Application(s) Topical <User Schedule>  dextrose 5%. 1000 milliLiter(s) (100 mL/Hr) IV Continuous <Continuous>  dextrose 5%. 1000 milliLiter(s) (50 mL/Hr) IV Continuous <Continuous>  dextrose 50% Injectable 25 Gram(s) IV Push once  dextrose 50% Injectable 25 Gram(s) IV Push once  dextrose 50% Injectable 12.5 Gram(s) IV Push once  glucagon  Injectable 1 milliGRAM(s) IntraMuscular once  insulin lispro (ADMELOG) corrective regimen sliding scale   SubCutaneous three times a day before meals  sodium chloride 3%. 500 milliLiter(s) (25 mL/Hr) IV Continuous <Continuous>    MEDICATIONS  (PRN):  dextrose Oral Gel 15 Gram(s) Oral once PRN Blood Glucose LESS THAN 70 milliGRAM(s)/deciliter      Care Discussed with Consultants/Other Providers [ x] YES  [ ] NO    RADIOLOGY & ADDITIONAL TESTS:  < from: CT Head No Cont (09.16.23 @ 13:46) >  IMPRESSION:    1.  Redemonstrated infarct in the left MCA distribution, more evolved   when compared prior imaging. There is a bright left MCA sign in the   distal left M1/proximal M2 segment, compatible with a probable thrombus.  2.  Decreased gray-white matter discrimination in the inferior left   temporal lobe and inferior left occipital lobe, more conspicuous when   compared prior imaging. Finding is likely compatible with a developing   left PCA distribution infarct. Given findings of multiple vascular   distributions, a embolic source should be considered.  3.  Faint increased attenuation along the sulci of the superiormargin of   the infarcted left MCA territory. This could represent small petechial   hemorrhages versus trace subarachnoid blood.     HOSPITAL COURSE:  89yo female with well controlled HTN, HLD and T2DM presents to ED as a tx from [facility] i/s/o suspected MCA stroke. Pt had stroke-like symptoms a couple of weeks prior with no significant deficits noted at that time and patient was DCed home on aspirin. Today, patient not answering phone and found down by neighbor in wellness check in pool of urine.  BIBEMS to [facility] where she was intubated for airway protection and then subsequently transferred to [facility] for possible neurosurgical intervention. Per family at bedside, patient a normally active and functional and ambulating multiple city blocks at a time. Prior to stroke 2-3 weeks before, patient otherwise fully functional, living alone with well controlled comorbidities. CT of the head - infarct in MCA distribution,   developing left PCA distribution infarct. Patient given hypertonic saline with NA goal 145-155 to prevent interval intracranial edema. SBP goal <180.     INTERVAL HPI/OVERNIGHT EVENTS:   No overnight events. MN Na 145, continued 3%. AM Na 147  Afebrile, hemodynamically stable     Subjective: Patient seen and examined at bedside. Stroke rec repeat CT head tomorrow. Family not interested in transfer at this time.  Afib 120s, diltiazem IVP 5mg x1 given. Patient responsive to painful stimuli. NG tube placed.    ICU Vital Signs Last 24 Hrs  T(C): 37.5 (17 Sep 2023 14:11), Max: 37.7 (16 Sep 2023 18:41)  T(F): 99.5 (17 Sep 2023 14:11), Max: 99.9 (16 Sep 2023 18:41)  HR: 119 (17 Sep 2023 13:00) (47 - 119)  BP: 161/83 (17 Sep 2023 13:00) (123/67 - 174/74)  BP(mean): 111 (17 Sep 2023 13:00) (90 - 111)  ABP: --  ABP(mean): --  RR: 25 (17 Sep 2023 13:00) (16 - 32)  SpO2: 100% (17 Sep 2023 13:00) (100% - 100%)    O2 Parameters below as of 17 Sep 2023 13:00  Patient On (Oxygen Delivery Method): ventilator, AC mode    O2 Concentration (%): 40      I&O's Summary    16 Sep 2023 07:01  -  17 Sep 2023 07:00  --------------------------------------------------------  IN: 575 mL / OUT: 637 mL / NET: -62 mL    17 Sep 2023 07:01  -  17 Sep 2023 15:13  --------------------------------------------------------  IN: 150 mL / OUT: 175 mL / NET: -25 mL      Mode: CPAP with PS  FiO2: 40  PEEP: 5  PS: 5  MAP: 6.8  PIP: 10    PHYSICAL EXAM:  General: thin; frail  HEENT: NC/AT; PERRL  Neck: intubated   Cardiovascular: RRR, +S1/S2; NO M/R/G  Respiratory: CTA B/L; no W/R/R  Gastrointestinal: soft, NT/ND; +BSx4  Extremities: WWP; no edema or cyanosis  Vascular: 2+ radial, DP/PT pulses B/L  Neurological: AAOx0; intubated. Pt occasionally opens eyes and tracks. Withdraws to painful stimuli in 3/4 extremities (not R arm)    LABS:                        13.4   14.74 )-----------( 189      ( 17 Sep 2023 06:14 )             40.0     09-17    147<H>  |  119<H>  |  31<H>  ----------------------------<  175<H>  4.2   |  21<L>  |  0.86    Ca    9.0      17 Sep 2023 06:14  Phos  3.1     09-17  Mg     2.3     09-17    TPro  6.6  /  Alb  3.7  /  TBili  0.6  /  DBili  x   /  AST  28  /  ALT  8<L>  /  AlkPhos  46  09-17    PT/INR - ( 15 Sep 2023 22:16 )   PT: 10.3 sec;   INR: 0.90        PTT - ( 15 Sep 2023 22:16 )  PTT:33.3 sec  Urinalysis Basic - ( 17 Sep 2023 06:14 )    Color: x / Appearance: x / SG: x / pH: x  Gluc: 175 mg/dL / Ketone: x  / Bili: x / Urobili: x   Blood: x / Protein: x / Nitrite: x   Leuk Esterase: x / RBC: x / WBC x   Sq Epi: x / Non Sq Epi: x / Bacteria: x    CAPILLARY BLOOD GLUCOSE    POCT Blood Glucose.: 167 mg/dL (17 Sep 2023 10:51)  POCT Blood Glucose.: 151 mg/dL (17 Sep 2023 07:01)  POCT Blood Glucose.: 150 mg/dL (16 Sep 2023 16:19)    ABG - ( 16 Sep 2023 02:18 )  pH, Arterial: 7.37  pH, Blood: x     /  pCO2: 36    /  pO2: 180   / HCO3: 21    / Base Excess: -3.9  /  SaO2: 99.7      Consultant(s) Notes Reviewed:  [x ] YES  [ ] NO    MEDICATIONS  (STANDING):  atorvastatin 80 milliGRAM(s) Oral at bedtime  chlorhexidine 0.12% Liquid 15 milliLiter(s) Oral Mucosa every 12 hours  chlorhexidine 2% Cloths 1 Application(s) Topical <User Schedule>  dextrose 5%. 1000 milliLiter(s) (100 mL/Hr) IV Continuous <Continuous>  dextrose 5%. 1000 milliLiter(s) (50 mL/Hr) IV Continuous <Continuous>  dextrose 50% Injectable 25 Gram(s) IV Push once  dextrose 50% Injectable 25 Gram(s) IV Push once  dextrose 50% Injectable 12.5 Gram(s) IV Push once  glucagon  Injectable 1 milliGRAM(s) IntraMuscular once  insulin lispro (ADMELOG) corrective regimen sliding scale   SubCutaneous three times a day before meals  sodium chloride 3%. 500 milliLiter(s) (25 mL/Hr) IV Continuous <Continuous>    MEDICATIONS  (PRN):  dextrose Oral Gel 15 Gram(s) Oral once PRN Blood Glucose LESS THAN 70 milliGRAM(s)/deciliter      Care Discussed with Consultants/Other Providers [ x] YES  [ ] NO    RADIOLOGY & ADDITIONAL TESTS:  < from: CT Head No Cont (09.16.23 @ 13:46) >  IMPRESSION:    1.  Redemonstrated infarct in the left MCA distribution, more evolved   when compared prior imaging. There is a bright left MCA sign in the   distal left M1/proximal M2 segment, compatible with a probable thrombus.  2.  Decreased gray-white matter discrimination in the inferior left   temporal lobe and inferior left occipital lobe, more conspicuous when   compared prior imaging. Finding is likely compatible with a developing   left PCA distribution infarct. Given findings of multiple vascular   distributions, a embolic source should be considered.  3.  Faint increased attenuation along the sulci of the superiormargin of   the infarcted left MCA territory. This could represent small petechial   hemorrhages versus trace subarachnoid blood.

## 2023-09-18 NOTE — DIETITIAN INITIAL EVALUATION ADULT - OTHER INFO
89yo female with PMHx of HTN, HLD, T2DM presents to [facility] as a tx from [facility] i/s/o concern for large L sided MCA stroke. ICU consulted for further management.     Pt care discussed in interdisciplinary care team rounds. Rx and labs reviewed. Pt intubated at time of assessment; vent to CPAP, , no pressors, no propofol at time of assessment. Ongoing goals of care discussions for benefit of extubation with risk for reintubation. Currently on Jevity 1.2 with goal of 45 ml/hr with LPS x2/day continuously over 24hrs. It pt to remain intubated in the ICU, recommend transition to 18hr cyclic feeds to allow for therapy and breathing trails; see recs below. No other reports GI distress or further nutritional concerns at this time. RDN will continue to reassess, intervene, and monitor as appropriate.     Pain: No non-verbal indicators   GI: Abdomen non-distended/non-tender, +BS x4, last bowel movement 9/18  Skin: Warm/Dry/Intact, no edema noted

## 2023-09-18 NOTE — DIETITIAN INITIAL EVALUATION ADULT - OTHER CALCULATIONS
Estimated nutritional needs determined using [facility] Standards of Nutrition Care for vented pt.

## 2023-09-18 NOTE — DIETITIAN INITIAL EVALUATION ADULT - PERTINENT MEDS FT
MEDICATIONS  (STANDING):  atorvastatin 80 milliGRAM(s) Oral at bedtime  cefTRIAXone   IVPB 1000 milliGRAM(s) IV Intermittent every 24 hours  chlorhexidine 0.12% Liquid 15 milliLiter(s) Oral Mucosa every 12 hours  chlorhexidine 2% Cloths 1 Application(s) Topical <User Schedule>  dextrose 5%. 1000 milliLiter(s) (50 mL/Hr) IV Continuous <Continuous>  dextrose 5%. 1000 milliLiter(s) (100 mL/Hr) IV Continuous <Continuous>  dextrose 50% Injectable 25 Gram(s) IV Push once  dextrose 50% Injectable 25 Gram(s) IV Push once  dextrose 50% Injectable 12.5 Gram(s) IV Push once  glucagon  Injectable 1 milliGRAM(s) IntraMuscular once  insulin lispro (ADMELOG) corrective regimen sliding scale   SubCutaneous every 6 hours    MEDICATIONS  (PRN):  dextrose Oral Gel 15 Gram(s) Oral once PRN Blood Glucose LESS THAN 70 milliGRAM(s)/deciliter

## 2023-09-18 NOTE — DIETITIAN INITIAL EVALUATION ADULT - ADD RECOMMEND
-Continue enteral nutrition support   *Recommend Jevity 1.2 @56 ml/hr with LPS x1/day from [id number] to provide 1008 ml tube feed, 1310 calories, 71 gProt., and 813 ml free water. This is 22.5 nonprotein calories and 1.56 gProt. per kg ideal body weight 45.5 kg.   -Monitor pressor and propofol demands; adjust tube feed as necessary   -Align nutrition with goals of care at all times   -Maintain aspiration precautions at all times  -Monitor chemistry, GI function, and skin integrity

## 2023-09-18 NOTE — PROGRESS NOTE ADULT - SUBJECTIVE AND OBJECTIVE BOX
HPI:  HPI: 89yo female with well controlled HTN, HLD and T2DM presents to ED as a tx from [facility] i/s/o suspected MCA stroke. Pt had stroke-like symptoms a couple of weeks prior with no significant deficits noted at that time and patient was DCed home on aspirin. Today, patient not answering phone and found down by neighbor in wellness check in pool of urine.  BIBEMS to [facility] where she was intubated for airway protection and then subsequently transferred to [facility] for possible neurosurgical intervention.     Per family at bedside, patient a normally active and functional and ambulating multiple city blocks at a time. Prior to stroke 2-3 weeks before, patient otherwise fully functional, living alone with well controlled comorbidities.       12 Point ROS Negative unless noted otherwise above. (16 Sep 2023 02:37)    Vital Signs Last 24 Hrs  T(C): 36.7 (18 Sep 2023 14:01), Max: 38.1 (18 Sep 2023 12:50)  T(F): 98.1 (18 Sep 2023 14:01), Max: 100.6 (18 Sep 2023 12:50)  HR: 109 (18 Sep 2023 18:00) (88 - 119)  BP: 148/98 (18 Sep 2023 18:00) (103/65 - 169/89)  BP(mean): 118 (18 Sep 2023 18:00) (80 - 126)  RR: 18 (18 Sep 2023 18:00) (13 - 21)  SpO2: 100% (18 Sep 2023 18:00) (98% - 100%)    Parameters below as of 18 Sep 2023 18:00  Patient On (Oxygen Delivery Method): ventilator    O2 Concentration (%): 35    I&O's Summary    17 Sep 2023 07:01  -  18 Sep 2023 07:00  --------------------------------------------------------  IN: 655 mL / OUT: 885 mL / NET: -230 mL    18 Sep 2023 07:01  -  18 Sep 2023 18:49  --------------------------------------------------------  IN: 765 mL / OUT: 490 mL / NET: 275 mL        PHYSICAL EXAM:  GEN: laying in bed, NAD  NEURO: AOx0. does not FC, OE minimally to noxious, face symmetric. +corneals/cough/gag. PERRL 3mm. LUE localizes antigravity, LLE moves spontaneously in plane of bed. RUE w/d, RLE trace spontaneous movement.  CV: RRR +S1/S2  PULM: intubated. CTAB  GI: Abd soft, NT/ND  EXT: ext warm, dry, nontender      LABS:                        13.2   15.07 )-----------( 176      ( 18 Sep 2023 04:43 )             40.5     09-18    145  |  119<H>  |  31<H>  ----------------------------<  178<H>  See Note   |  16<L>  |  0.71    Ca    8.9      18 Sep 2023 15:38  Phos  2.1     09-18  Mg     2.6     09-18    TPro  6.7  /  Alb  3.7  /  TBili  1.1  /  DBili  x   /  AST  37  /  ALT  9<L>  /  AlkPhos  53  09-18      Urinalysis Basic - ( 18 Sep 2023 15:38 )    Color: x / Appearance: x / SG: x / pH: x  Gluc: 178 mg/dL / Ketone: x  / Bili: x / Urobili: x   Blood: x / Protein: x / Nitrite: x   Leuk Esterase: x / RBC: x / WBC x   Sq Epi: x / Non Sq Epi: x / Bacteria: x          CAPILLARY BLOOD GLUCOSE      POCT Blood Glucose.: 146 mg/dL (18 Sep 2023 16:49)  POCT Blood Glucose.: 235 mg/dL (18 Sep 2023 11:14)  POCT Blood Glucose.: 195 mg/dL (18 Sep 2023 07:10)      Drug Levels: [] N/A    CSF Analysis: [] N/A      Allergies    No Known Allergies    Intolerances      MEDICATIONS:  Antibiotics:  cefTRIAXone   IVPB 1000 milliGRAM(s) IV Intermittent every 24 hours    Neuro:    Anticoagulation:    OTHER:  atorvastatin 80 milliGRAM(s) Oral at bedtime  chlorhexidine 0.12% Liquid 15 milliLiter(s) Oral Mucosa every 12 hours  chlorhexidine 2% Cloths 1 Application(s) Topical <User Schedule>  dextrose 50% Injectable 25 Gram(s) IV Push once  dextrose 50% Injectable 25 Gram(s) IV Push once  dextrose 50% Injectable 12.5 Gram(s) IV Push once  dextrose Oral Gel 15 Gram(s) Oral once PRN  glucagon  Injectable 1 milliGRAM(s) IntraMuscular once  insulin lispro (ADMELOG) corrective regimen sliding scale   SubCutaneous every 6 hours    IVF:  dextrose 5%. 1000 milliLiter(s) IV Continuous <Continuous>  dextrose 5%. 1000 milliLiter(s) IV Continuous <Continuous>  sodium chloride 3%. 500 milliLiter(s) IV Continuous <Continuous>    CULTURES:  Culture Results:   Numerous Escherichia coli Susceptibility to follow. (09-17 @ 11:50)    RADIOLOGY & ADDITIONAL TESTS:  CTH 9/18:  Since prior CT head 9/16/2023, continued evolution of left   MCA and PCA territorial infarctions with increased mass effect and   midline shift measuring 8 mm previously 5 mm. No evidence of hemorrhagic   transformation.  Acute infarct in the left thalamus.      ASSESSMENT:  89yo Female with PMHX of HTN, HLD, T2DM, hx of TIA vs CVA ~2.5w ago (admitted to [place] and discharged on ASA) without residual deficits BIBEMS to [facility] ED as trasnfer from [facility] for L MCA stroke as possible thrombectomy candidate. LKW is today at 1300 when patient was speaking normally on the phone, later around 1400 patient was not picking up her phone, family was concerned and called neighbor for wellness check, was found to be unresponsive, in pool of urine with the smoke alarm going off. Patient was brought to [facility] where she was intubated for airway protection, on non-contrast CTH was found to have hyperdense left MCA sign, CTP and CTA H/N nondiagnostic due to poor contrast bolus however did note concern for hypopharynx injury 2/2 traumatic intubation. Pt transferred to [facility] for possible mechanical thrombectomy. On arrival to [facility] ED, patient intubated and sedated on propofol, , /80. After holding propofol for ~15 mins NIHSS 32, spontaneously moving LUE/LLE, localizing to noxious throughout left side, right UE 0/5, right LE tripleflexion to noxious. Delay in obtaining CT imaging due to stabilizing airway on ventilator and setting up ventilator settings. CTH with evidence of acute L MCA infarct, CTP with mismatch volume of 238cc with mismatch ratio 6.8 within L MCA territory, CTA H/N with left MCA bifurcation occlusion, free air in right carotid deep spaces likely due to traumatic intubation. Pt out of window for thrombolytic treatment. After discussion between Dr. Ontiveros and Dr. Plascencia, patient was deemed not a candidate for mechanical thrombectomy given ischemic changes along L MCA territory on CTH with large correlating core on CTP. Per family at bedside, patient a normally active and functional and ambulating multiple city blocks at a time. Prior to stroke 2-3 weeks before, patient otherwise fully functional, living alone with well controlled comorbidities. NSGY consulted for hemicrani watch    PLAN:  - recommend max medical management: mannitol 50g, Na goal 145-150, ETCO2 30-35, HOB >30 degrees  - will hold off on surgery for now   - continue care as per MICU    above plan discussed with Dr. Yu HPI:  HPI: 87yo female with well controlled HTN, HLD and T2DM presents to ED as a tx from [facility] i/s/o suspected MCA stroke. Pt had stroke-like symptoms a couple of weeks prior with no significant deficits noted at that time and patient was DCed home on aspirin. Today, patient not answering phone and found down by neighbor in wellness check in pool of urine.  BIBEMS to [facility] where she was intubated for airway protection and then subsequently transferred to [facility] for possible neurosurgical intervention.     Per family at bedside, patient a normally active and functional and ambulating multiple city blocks at a time. Prior to stroke 2-3 weeks before, patient otherwise fully functional, living alone with well controlled comorbidities.       12 Point ROS Negative unless noted otherwise above. (16 Sep 2023 02:37)    Vital Signs Last 24 Hrs  T(C): 36.7 (18 Sep 2023 14:01), Max: 38.1 (18 Sep 2023 12:50)  T(F): 98.1 (18 Sep 2023 14:01), Max: 100.6 (18 Sep 2023 12:50)  HR: 109 (18 Sep 2023 18:00) (88 - 119)  BP: 148/98 (18 Sep 2023 18:00) (103/65 - 169/89)  BP(mean): 118 (18 Sep 2023 18:00) (80 - 126)  RR: 18 (18 Sep 2023 18:00) (13 - 21)  SpO2: 100% (18 Sep 2023 18:00) (98% - 100%)    Parameters below as of 18 Sep 2023 18:00  Patient On (Oxygen Delivery Method): ventilator    O2 Concentration (%): 35    I&O's Summary    17 Sep 2023 07:01  -  18 Sep 2023 07:00  --------------------------------------------------------  IN: 655 mL / OUT: 885 mL / NET: -230 mL    18 Sep 2023 07:01  -  18 Sep 2023 18:49  --------------------------------------------------------  IN: 765 mL / OUT: 490 mL / NET: 275 mL        PHYSICAL EXAM:  GEN: laying in bed, NAD  NEURO: AOx0. does not FC, OE minimally to noxious, face symmetric. +corneals/cough/gag. PERRL 3mm. LUE localizes antigravity, LLE moves spontaneously in plane of bed. RUE w/d, RLE trace spontaneous movement.  CV: RRR +S1/S2  PULM: intubated. CTAB  GI: Abd soft, NT/ND  EXT: ext warm, dry, nontender      LABS:                        13.2   15.07 )-----------( 176      ( 18 Sep 2023 04:43 )             40.5     09-18    145  |  119<H>  |  31<H>  ----------------------------<  178<H>  See Note   |  16<L>  |  0.71    Ca    8.9      18 Sep 2023 15:38  Phos  2.1     09-18  Mg     2.6     09-18    TPro  6.7  /  Alb  3.7  /  TBili  1.1  /  DBili  x   /  AST  37  /  ALT  9<L>  /  AlkPhos  53  09-18      Urinalysis Basic - ( 18 Sep 2023 15:38 )    Color: x / Appearance: x / SG: x / pH: x  Gluc: 178 mg/dL / Ketone: x  / Bili: x / Urobili: x   Blood: x / Protein: x / Nitrite: x   Leuk Esterase: x / RBC: x / WBC x   Sq Epi: x / Non Sq Epi: x / Bacteria: x          CAPILLARY BLOOD GLUCOSE      POCT Blood Glucose.: 146 mg/dL (18 Sep 2023 16:49)  POCT Blood Glucose.: 235 mg/dL (18 Sep 2023 11:14)  POCT Blood Glucose.: 195 mg/dL (18 Sep 2023 07:10)      Drug Levels: [] N/A    CSF Analysis: [] N/A      Allergies    No Known Allergies    Intolerances      MEDICATIONS:  Antibiotics:  cefTRIAXone   IVPB 1000 milliGRAM(s) IV Intermittent every 24 hours    Neuro:    Anticoagulation:    OTHER:  atorvastatin 80 milliGRAM(s) Oral at bedtime  chlorhexidine 0.12% Liquid 15 milliLiter(s) Oral Mucosa every 12 hours  chlorhexidine 2% Cloths 1 Application(s) Topical <User Schedule>  dextrose 50% Injectable 25 Gram(s) IV Push once  dextrose 50% Injectable 25 Gram(s) IV Push once  dextrose 50% Injectable 12.5 Gram(s) IV Push once  dextrose Oral Gel 15 Gram(s) Oral once PRN  glucagon  Injectable 1 milliGRAM(s) IntraMuscular once  insulin lispro (ADMELOG) corrective regimen sliding scale   SubCutaneous every 6 hours    IVF:  dextrose 5%. 1000 milliLiter(s) IV Continuous <Continuous>  dextrose 5%. 1000 milliLiter(s) IV Continuous <Continuous>  sodium chloride 3%. 500 milliLiter(s) IV Continuous <Continuous>    CULTURES:  Culture Results:   Numerous Escherichia coli Susceptibility to follow. (09-17 @ 11:50)    RADIOLOGY & ADDITIONAL TESTS:  CTH 9/18:  Since prior CT head 9/16/2023, continued evolution of left   MCA and PCA territorial infarctions with increased mass effect and   midline shift measuring 8 mm previously 5 mm. No evidence of hemorrhagic   transformation.  Acute infarct in the left thalamus.      ASSESSMENT:  87yo Female with PMHX of HTN, HLD, T2DM, hx of TIA vs CVA ~2.5w ago (admitted to [place] and discharged on ASA) without residual deficits BIBEMS to [facility] ED as trasnfer from [facility] for L MCA stroke as possible thrombectomy candidate. LKW is today at 1300 when patient was speaking normally on the phone, later around 1400 patient was not picking up her phone, family was concerned and called neighbor for wellness check, was found to be unresponsive, in pool of urine with the smoke alarm going off. Patient was brought to [facility] where she was intubated for airway protection, on non-contrast CTH was found to have hyperdense left MCA sign, CTP and CTA H/N nondiagnostic due to poor contrast bolus however did note concern for hypopharynx injury 2/2 traumatic intubation. Pt transferred to [facility] for possible mechanical thrombectomy. On arrival to [facility] ED, patient intubated and sedated on propofol, , /80. After holding propofol for ~15 mins NIHSS 32, spontaneously moving LUE/LLE, localizing to noxious throughout left side, right UE 0/5, right LE tripleflexion to noxious. Delay in obtaining CT imaging due to stabilizing airway on ventilator and setting up ventilator settings. CTH with evidence of acute L MCA infarct, CTP with mismatch volume of 238cc with mismatch ratio 6.8 within L MCA territory, CTA H/N with left MCA bifurcation occlusion, free air in right carotid deep spaces likely due to traumatic intubation. Pt out of window for thrombolytic treatment. After discussion between Dr. Ontiveros and Dr. Plascencia, patient was deemed not a candidate for mechanical thrombectomy given ischemic changes along L MCA territory on CTH with large correlating core on CTP. Per family at bedside, patient a normally active and functional and ambulating multiple city blocks at a time. Prior to stroke 2-3 weeks before, patient otherwise fully functional, living alone with well controlled comorbidities. NSGY consulted for hemicrani watch    PLAN:  - recommend max medical management: mannitol 50g, Na goal 145-150, ETCO2 30-35, HOB >30 degrees  - Recommend repeat CTH in 12 hours  - will hold off on surgery for now   - continue care as per MICU    above plan discussed with Dr. Yu

## 2023-09-18 NOTE — EEG REPORT - NS EEG TEXT BOX
[facility] Department of Neurology  Inpatient Continuous video-Electroencephalogram    Patient Name:	AMANDA JESSICA    :	1934  MRN:	[id number]    Study Start Date/Time:  2023, 3:14:00 AM  Study End Date/Time: in progress    Referred by: Dr. Frey/Dr. Ontiveros    Brief Clinical History:  AMANDA JESSICA is a 88 year old Female with L MCA stroke; study performed to investigate for seizures or markers of epilepsy.    Diagnosis Code:   R56.9 convulsions/seizure  The live video was: unmonitored.    Pertinent Medications:  n/a    Acquisition Details:  Electroencephalography was acquired using a minimum of 21 channels on an [employer] system v 9.3.1 with electrode placement according to the standard International 10-20 system following ACNS (American Clinical Neurophysiology Society) guidelines for Long-Term Video EEG monitoring.  Anterior temporal T1 and T2 electrodes were utilized whenever possible.   The [employer] automated spike & seizure detections were all reviewed in detail, in addition to extensive portions of raw EEG.      Day 1: 2023 @ 3:14:00 AM to next morning @ 07:00 am  Background:  continuous, with predominantly alpha and beta frequencies over the right hemisphere and theta-delta frequencies over the left hemisphere.  Symmetry:  frequent polymorphic delta slowing was present over the left frontotemporal areas.   Posterior Dominant Rhythm:  10 Hz, better formed over the right.   Organization: Appropriate anterior-posterior gradient.  Voltage:  Normal (20+ uV)  Variability: Yes. 		Reactivity: Yes.  N2 sleep: Symmetric, synchronous spindles and K complexes.  Spontaneous Activity:  No epileptiform discharges.  Periodic/rhythmic activity:  None  Events:  No electrographic seizures or significant clinical events.  Provocations:  Hyperventilation and Photic stimulation: was not performed.    Daily Summary:    Focal  slowing suggestive of focal cerebral dysfunction in the left frontotemporal areas.    Johana Lewis DO  Attending Neurologist, [facility]      Daily Updates (from 07:00 am until 07:00 am):  Day 2  -2023:   Background:  continuous, with predominantly alpha and beta frequencies over the right hemisphere and theta-delta frequencies over the left hemisphere.  Symmetry:  continuous polymorphic delta slowing was present over the left frontotemporal areas, at times sharply contoured.   Posterior Dominant Rhythm:  10 Hz, better formed over the right.   Organization: Appropriate anterior-posterior gradient.  Voltage:  Normal (20+ uV)  Variability: Yes. 		Reactivity: Yes.  N2 sleep: Symmetric, synchronous spindles and K complexes.  Spontaneous Activity:  No epileptiform discharges.  Periodic/rhythmic activity:  None  Events:  No electrographic seizures or significant clinical events.  Provocations:  Hyperventilation and Photic stimulation: was not performed.    Daily Summary:    Unchanged study indicative of focal slowing suggestive of focal cerebral dysfunction in the left frontotemporal areas.    Johana Lewis DO  Attending Neurologist, [facility]        Daily Updates  Day 3  9/17 07:00 to 12:15 PM   Background:  continuous, with predominantly alpha and beta frequencies over the right hemisphere and theta-delta frequencies over the left hemisphere.  Symmetry:  continuous polymorphic delta slowing was present over the left frontotemporal areas, at times sharply contoured.   Posterior Dominant Rhythm:  10 Hz, better formed over the right.   Organization: Appropriate anterior-posterior gradient.  Voltage:  Normal (20+ uV)  Variability: Yes. 		Reactivity: Yes.  N2 sleep: Symmetric, synchronous spindles and K complexes.  Spontaneous Activity:  No epileptiform discharges.  Rarely there was an increase in sharply contoured sporadic left frontal delta waves, nearly periodic.  Periodic/rhythmic activity:  None  Events:  No electrographic seizures or significant clinical events.  Provocations:  Hyperventilation and Photic stimulation: was not performed.    Daily Summary:    Continued focal slowing, sometimes sharply contoured, suggestive of focal cerebral dysfunction in the left frontotemporal areas, but no definite epileptiform discharges.    Dev Gutierrez MD  Attending Neurologist, [facility]      FINAL Summary:  Abnormal continuous av-EEG study.  1)	Continuous left frontal focal slowing, sometimes sharply contoured  2)	Diffuse theta-delta slowing.  3)	No definite epileptiform discharges.  Final Clinical Correlation:  Findings suggestive of cerebral dysfunction, focally worse in the left frontotemporal areas.  There were no findings of active epilepsy, however this alone does not rule out the diagnosis.       Dev Gutierrez MD  Attending Neurologist, [facility]

## 2023-09-18 NOTE — PROGRESS NOTE ADULT - ASSESSMENT
87yo Female with PMHX of HTN, HLD, T2DM, hx of TIA vs CVA ~2.5w ago (L sided weakness admitted to [place] and discharged on ASA without residual deficits) BIBEMS to [facility] ED as transfer from [facility] for L MCA stroke as possible thrombectomy candidate. LKW is today at 1300 when patient was speaking normally on the phone, later around 1400 patient was not picking up her phone, family was concerned and called neighbor for wellness check, was found to be unresponsive, in pool of urine with the smoke alarm going off. Patient was brought to [facility] where she was intubated for airway protection, on non-contrast CTH was found to have hyperdense left MCA sign, CTP and CTA H/N nondiagnostic due to poor contrast bolus however did note concern for hypopharynx injury 2/2 traumatic intubation. Pt transferred to [facility] for possible mechanical thrombectomy. On arrival to [facility] ED, patient intubated and sedated on propofol, , /80. After holding propofol for ~15 mins NIHSS 32. CTH with evidence of acute L MCA infarct, CTP with mismatch volume of 238cc with mismatch ratio 6.8 within L MCA territory, CTA H/N with left MCA bifurcation occlusion, free air in right carotid deep spaces likely due to traumatic intubation. Pt out of window for thrombolytic treatment. After discussion between Dr. Ontiveros and Dr. Plascencia, patient was deemed not a candidate for mechanical thrombectomy given ischemic changes along L MCA territory on CTH with large correlating core on CTP.     Stroke etiology likely embolic source (cardioembolic vs hypercoagulable state)     Recommend repeat CTH today    1)Secondary stroke prevention  - hold AC/AP at this time due to increased risk of hemorrhagic conversion given large infarct   - continue Atorvastatin 80mg PO daily     2) Stroke risk factors  -     3) Further management  - recommend GOC discussion and palliative care consult; patient's family states they do not believe that patient has discussed DNR/DNI status  - obtain collateral from [facility]   - Na goal 145-155 to prevent interval intracranial edema   - recommend SBP goal <180  - recommend q1hr coma checks and vitals   - provide stroke education    Discussed with Neurology Attending Dr. Mustafa  89yo Female with PMHX of HTN, HLD, T2DM, and TIA (2.5 weeks ago; pt with L sided weakness, was admitted to [place] and discharged on ASA without residual deficits) BIBEMS to [facility] ED as transfer from [facility] for L MCA stroke as possible thrombectomy candidate. Pt intubated at [place] and found to hyperdense L MCA on noncon CTH. NIHSS 32 on arrival. CTH with evidence of acute L MCA infarct, CTP with mismatch volume of 238cc with mismatch ratio 6.8 within L MCA territory, CTA H/N with left MCA bifurcation occlusion, free air in right carotid deep spaces likely due to traumatic intubation. Pt out of window for thrombolytic treatment. After discussion between Dr. Ontiveros and Dr. Plascencia, patient was deemed not a candidate for mechanical thrombectomy given ischemic changes along L MCA territory on CTH with large correlating core on CTP.     Stroke etiology likely embolic source (cardioembolic vs hypercoagulable state)     CVA workup  - Recommend repeat CTH today  - EEG and Keppra 500mg BID; lip twitching noted on exam  - hold AC/AP at this time due to increased risk of hemorrhagic conversion given large infarct pending repeat CTH  - continue Atorvastatin 80mg PO daily   - recommend GOC discussion and palliative care consult; patient's family states they do not believe that patient has discussed DNR/DNI status  - Na goal 145-155 to prevent interval intracranial edema   - NRSGY following for enmanuel-crani watch, f/u recs  - recommend SBP goal <180  - recommend q1hr coma checks and vitals   - provide stroke education  - obtain collateral from [facility]     Discussed with Neurology Fellow Dr. Elizondo and Attending Dr. Mustafa 89yo Female with PMHX of HTN, HLD, T2DM, and TIA (2.5 weeks ago; pt with L sided weakness, was admitted to [place] and discharged on ASA without residual deficits) BIBEMS to [facility] ED as transfer from [facility] for L MCA stroke as possible thrombectomy candidate. Pt intubated at [place] and found to hyperdense L MCA on noncon CTH. NIHSS 32 on arrival. CTH with evidence of acute L MCA infarct, CTP with mismatch volume of 238cc with mismatch ratio 6.8 within L MCA territory, CTA H/N with left MCA bifurcation occlusion, free air in right carotid deep spaces likely due to traumatic intubation. Pt out of window for thrombolytic treatment. After discussion between Dr. Ontiveros and Dr. Plascencia, patient was deemed not a candidate for mechanical thrombectomy given ischemic changes along L MCA territory on CTH with large correlating core on CTP. On exam, Afib noted on the monitor. New-onset afib in RVR with heart rate >100.     Stroke etiology likely embolic source (cardioembolic vs hypercoagulable state)     CVA ( L MCA and L PCA infarct)  - Recommend repeat CTH today  - EEG and Keppra 500mg BID; lip twitching noted on exam  - hold AC/AP at this time due to increased risk of hemorrhagic conversion given large infarct pending repeat CTH  - continue Atorvastatin 80mg PO daily   - Na goal 145-155 to prevent interval intracranial edema   - NRSGY following for enmanuel-crani watch, f/u recs  - recommend SBP goal <180  - recommend q1hr coma checks and vitals   - provide stroke education  - obtain collateral from [facility]     Recommend GOC discussion and palliative care consult; patient's family states they do not believe that patient has discussed DNR/DNI status    Discussed with Neurology Fellow Dr. Elizondo and Attending Dr. Mustafa

## 2023-09-18 NOTE — PROGRESS NOTE ADULT - SUBJECTIVE AND OBJECTIVE BOX
Neurology Stroke Progress Note    INTERVAL HPI/OVERNIGHT EVENTS:  Patient seen and examined at bedside with family present. Unchanged EEG with focal slowing suggestive of focal cerebral dysfunction in the left frontotemporal areas. EEG discontinued     MEDICATIONS  (STANDING):  atorvastatin 80 milliGRAM(s) Oral at bedtime  cefTRIAXone   IVPB 1000 milliGRAM(s) IV Intermittent every 24 hours  chlorhexidine 0.12% Liquid 15 milliLiter(s) Oral Mucosa every 12 hours  chlorhexidine 2% Cloths 1 Application(s) Topical <User Schedule>  dextrose 5%. 1000 milliLiter(s) (100 mL/Hr) IV Continuous <Continuous>  dextrose 5%. 1000 milliLiter(s) (50 mL/Hr) IV Continuous <Continuous>  dextrose 50% Injectable 25 Gram(s) IV Push once  dextrose 50% Injectable 25 Gram(s) IV Push once  dextrose 50% Injectable 12.5 Gram(s) IV Push once  glucagon  Injectable 1 milliGRAM(s) IntraMuscular once  insulin lispro (ADMELOG) corrective regimen sliding scale   SubCutaneous every 6 hours  sodium chloride 3%. 500 milliLiter(s) (25 mL/Hr) IV Continuous <Continuous>    MEDICATIONS  (PRN):  dextrose Oral Gel 15 Gram(s) Oral once PRN Blood Glucose LESS THAN 70 milliGRAM(s)/deciliter      Allergies    No Known Allergies    Intolerances        Vital Signs Last 24 Hrs  T(C): 38.1 (18 Sep 2023 12:50), Max: 38.1 (18 Sep 2023 12:50)  T(F): 100.6 (18 Sep 2023 12:50), Max: 100.6 (18 Sep 2023 12:50)  HR: 102 (18 Sep 2023 13:46) (88 - 125)  BP: 169/89 (18 Sep 2023 13:00) (103/65 - 169/89)  BP(mean): 119 (18 Sep 2023 13:00) (80 - 120)  RR: 20 (18 Sep 2023 13:00) (14 - 23)  SpO2: 100% (18 Sep 2023 13:46) (98% - 100%)    Parameters below as of 18 Sep 2023 13:00  Patient On (Oxygen Delivery Method): ventilator, CPAP    O2 Concentration (%): 35    Physical exam:  General: No acute distress, awake and alert  Eyes: Anicteric sclerae, moist conjunctivae, see below for CNs  Neck: trachea midline, FROM, supple, no thyromegaly or lymphadenopathy  Cardiovascular: Regular rate and rhythm, no murmurs, rubs, or gallops. No carotid bruits.   Pulmonary: Anterior breath sounds clear bilaterally, no crackles or wheezing. No use of accessory muscles  GI: Abdomen soft, non-distended, non-tender  Extremities: Radial and DP pulses +2, no edema    Neurologic:  -Mental status: AAOx0; intubated; Attempts to open eyes after eyes held open for pupil examination; does not follow commands in [place] (by family)  -Cranial nerves:   II: BTT not intact in R visual field of R eye  III, IV, VI: Pupils equally round and reactive to light; unable to test EOM  V:  Unable to test facial sensation; Corneal reflex intact  VII:  Right facial droop   VIII: Unable to test hearing  Motor: LUE: 3/5 spontaneously and to noxious; LLE: **** 1/5 RUE/RLE: tripleflexion to noxious  Sensation: localizes noxious stimulus with sternal rub with LUE; withdraws to pain in LUE/LLE and RLE, not RUE.   Coordination: Unable to test      LABS:                        13.2   15.07 )-----------( 176      ( 18 Sep 2023 04:43 )             40.5     09-18    149<H>  |  119<H>  |  33<H>  ----------------------------<  222<H>  4.1   |  22  |  0.77    Ca    9.1      18 Sep 2023 04:43  Phos  2.1     09-18  Mg     2.6     09-18    TPro  6.7  /  Alb  3.7  /  TBili  1.1  /  DBili  x   /  AST  37  /  ALT  9<L>  /  AlkPhos  53  09-18      Urinalysis Basic - ( 18 Sep 2023 04:43 )    Color: x / Appearance: x / SG: x / pH: x  Gluc: 222 mg/dL / Ketone: x  / Bili: x / Urobili: x   Blood: x / Protein: x / Nitrite: x   Leuk Esterase: x / RBC: x / WBC x   Sq Epi: x / Non Sq Epi: x / Bacteria: x        RADIOLOGY & ADDITIONAL TESTS:     CT Head No Cont (09.16.23 @ 13:46) >  IMPRESSION:    1.  Redemonstrated infarct in the left MCA distribution, more evolved   when compared prior imaging. There is a bright left MCA sign in the   distal left M1/proximal M2 segment, compatible with a probable thrombus.    2.  Decreased gray-white matter discrimination in the inferior left   temporal lobe and inferior left occipital lobe, more conspicuous when   compared prior imaging. Finding is likely compatible with a developing   left PCA distribution infarct. Given findings of multiple vascular   distributions, a embolic source should be considered.    3.  Faint increased attenuation along the sulci of the superiormargin of   the infarcted left MCA territory. This could represent small petechial   hemorrhages versus trace subarachnoid blood.     Neurology Stroke Progress Note    INTERVAL HPI/OVERNIGHT EVENTS:  Patient seen and examined at bedside with family present. Unchanged EEG with focal slowing suggestive of focal cerebral dysfunction in the left frontotemporal areas. EEG discontinued     MEDICATIONS  (STANDING):  atorvastatin 80 milliGRAM(s) Oral at bedtime  cefTRIAXone   IVPB 1000 milliGRAM(s) IV Intermittent every 24 hours  chlorhexidine 0.12% Liquid 15 milliLiter(s) Oral Mucosa every 12 hours  chlorhexidine 2% Cloths 1 Application(s) Topical <User Schedule>  dextrose 5%. 1000 milliLiter(s) (100 mL/Hr) IV Continuous <Continuous>  dextrose 5%. 1000 milliLiter(s) (50 mL/Hr) IV Continuous <Continuous>  dextrose 50% Injectable 25 Gram(s) IV Push once  dextrose 50% Injectable 25 Gram(s) IV Push once  dextrose 50% Injectable 12.5 Gram(s) IV Push once  glucagon  Injectable 1 milliGRAM(s) IntraMuscular once  insulin lispro (ADMELOG) corrective regimen sliding scale   SubCutaneous every 6 hours  sodium chloride 3%. 500 milliLiter(s) (25 mL/Hr) IV Continuous <Continuous>    MEDICATIONS  (PRN):  dextrose Oral Gel 15 Gram(s) Oral once PRN Blood Glucose LESS THAN 70 milliGRAM(s)/deciliter    Allergies  No Known Allergies  Intolerances    Vital Signs Last 24 Hrs  T(C): 38.1 (18 Sep 2023 12:50), Max: 38.1 (18 Sep 2023 12:50)  T(F): 100.6 (18 Sep 2023 12:50), Max: 100.6 (18 Sep 2023 12:50)  HR: 102 (18 Sep 2023 13:46) (88 - 125)  BP: 169/89 (18 Sep 2023 13:00) (103/65 - 169/89)  BP(mean): 119 (18 Sep 2023 13:00) (80 - 120)  RR: 20 (18 Sep 2023 13:00) (14 - 23)  SpO2: 100% (18 Sep 2023 13:46) (98% - 100%)    Parameters below as of 18 Sep 2023 13:00  Patient On (Oxygen Delivery Method): ventilator, CPAP    O2 Concentration (%): 35    Physical exam:  General: No acute distress, awake and alert  Eyes: Anicteric sclerae, moist conjunctivae, see below for CNs  Neck: trachea midline, FROM, supple, no thyromegaly or lymphadenopathy  Cardiovascular: Regular rate and rhythm, no murmurs, rubs, or gallops. No carotid bruits.   Pulmonary: Anterior breath sounds clear bilaterally, no crackles or wheezing. No use of accessory muscles  GI: Abdomen soft, non-distended, non-tender  Extremities: Radial and DP pulses +2, no edema    Neurologic:  -Mental status: AAOx0; intubated; Attempts to open eyes after eyes held open for pupil examination; does not follow commands in [place] (by family)  -Cranial nerves:   II: BTT not intact in R visual field of R eye  III, IV, VI: Pupils equally round and reactive to light; unable to test EOM  V:  Unable to test facial sensation; Corneal reflex intact  VII:  Right facial droop   VIII: Unable to test hearing  Motor: LUE: 3/5 spontaneously and to noxious; LLE: **** 1/5 RUE/RLE: tripleflexion to noxious  Sensation: localizes noxious stimulus with sternal rub with LUE; withdraws to pain in LUE/LLE and RLE, not RUE.   Coordination: Unable to test      LABS:                        13.2   15.07 )-----------( 176      ( 18 Sep 2023 04:43 )             40.5     09-18    149<H>  |  119<H>  |  33<H>  ----------------------------<  222<H>  4.1   |  22  |  0.77    Ca    9.1      18 Sep 2023 04:43  Phos  2.1     09-18  Mg     2.6     09-18    TPro  6.7  /  Alb  3.7  /  TBili  1.1  /  DBili  x   /  AST  37  /  ALT  9<L>  /  AlkPhos  53  09-18      Urinalysis Basic - ( 18 Sep 2023 04:43 )    Color: x / Appearance: x / SG: x / pH: x  Gluc: 222 mg/dL / Ketone: x  / Bili: x / Urobili: x   Blood: x / Protein: x / Nitrite: x   Leuk Esterase: x / RBC: x / WBC x   Sq Epi: x / Non Sq Epi: x / Bacteria: x      RADIOLOGY & ADDITIONAL TESTS:   CT Head No Cont (09.16.23 @ 13:46) >  IMPRESSION:  1.  Redemonstrated infarct in the left MCA distribution, more evolved   when compared prior imaging. There is a bright left MCA sign in the   distal left M1/proximal M2 segment, compatible with a probable thrombus.    2.  Decreased gray-white matter discrimination in the inferior left   temporal lobe and inferior left occipital lobe, more conspicuous when   compared prior imaging. Finding is likely compatible with a developing   left PCA distribution infarct. Given findings of multiple vascular   distributions, a embolic source should be considered.    3.  Faint increased attenuation along the sulci of the superiormargin of   the infarcted left MCA territory. This could represent small petechial   hemorrhages versus trace subarachnoid blood.     Neurology Stroke Progress Note    INTERVAL HPI/OVERNIGHT EVENTS:  Patient seen and examined at bedside with family present. Unchanged EEG with focal slowing suggestive of focal cerebral dysfunction in the left frontotemporal areas. EEG discontinued     MEDICATIONS  (STANDING):  atorvastatin 80 milliGRAM(s) Oral at bedtime  cefTRIAXone   IVPB 1000 milliGRAM(s) IV Intermittent every 24 hours  chlorhexidine 0.12% Liquid 15 milliLiter(s) Oral Mucosa every 12 hours  chlorhexidine 2% Cloths 1 Application(s) Topical <User Schedule>  dextrose 5%. 1000 milliLiter(s) (100 mL/Hr) IV Continuous <Continuous>  dextrose 5%. 1000 milliLiter(s) (50 mL/Hr) IV Continuous <Continuous>  dextrose 50% Injectable 25 Gram(s) IV Push once  dextrose 50% Injectable 25 Gram(s) IV Push once  dextrose 50% Injectable 12.5 Gram(s) IV Push once  glucagon  Injectable 1 milliGRAM(s) IntraMuscular once  insulin lispro (ADMELOG) corrective regimen sliding scale   SubCutaneous every 6 hours  sodium chloride 3%. 500 milliLiter(s) (25 mL/Hr) IV Continuous <Continuous>    MEDICATIONS  (PRN):  dextrose Oral Gel 15 Gram(s) Oral once PRN Blood Glucose LESS THAN 70 milliGRAM(s)/deciliter    Allergies  No Known Allergies  Intolerances    Vital Signs Last 24 Hrs  T(C): 38.1 (18 Sep 2023 12:50), Max: 38.1 (18 Sep 2023 12:50)  T(F): 100.6 (18 Sep 2023 12:50), Max: 100.6 (18 Sep 2023 12:50)  HR: 102 (18 Sep 2023 13:46) (88 - 125)  BP: 169/89 (18 Sep 2023 13:00) (103/65 - 169/89)  BP(mean): 119 (18 Sep 2023 13:00) (80 - 120)  RR: 20 (18 Sep 2023 13:00) (14 - 23)  SpO2: 100% (18 Sep 2023 13:46) (98% - 100%)    Parameters below as of 18 Sep 2023 13:00  Patient On (Oxygen Delivery Method): ventilator, CPAP    O2 Concentration (%): 35    Physical exam:  General: No acute distress, awake and alert  Eyes: Anicteric sclerae, moist conjunctivae, see below for CNs  Neck: trachea midline, FROM, supple, no thyromegaly or lymphadenopathy  Cardiovascular: Regular rate and rhythm, no murmurs, rubs, or gallops. No carotid bruits.   Pulmonary: Anterior breath sounds clear bilaterally, no crackles or wheezing. No use of accessory muscles  GI: Abdomen soft, non-distended, non-tender  Extremities: Radial and DP pulses +2, no edema    Neurologic:  -Mental status: AAOx0; intubated; Attempts to open eyes after eyes held open for pupil examination; does not follow commands in [place] (by family)  -Cranial nerves:   II: BTT not intact in R visual field of R eye  III, IV, VI: Pupils equally round and reactive to light; unable to test EOM  V:  Unable to test facial sensation; Corneal reflex intact  VII:  Right facial droop   VIII: Unable to test hearing  Motor: LUE: 3/5 spontaneously and to noxious; LLE: 2/5 to noxious  RUE: 0/5   RLE: 1/5 to noxious     Sensation: localizes noxious stimulus with sternal rub with LUE; withdraws to pain in LUE/LLE and RLE, not RUE.   Coordination: Unable to test      LABS:                        13.2   15.07 )-----------( 176      ( 18 Sep 2023 04:43 )             40.5     09-18    149<H>  |  119<H>  |  33<H>  ----------------------------<  222<H>  4.1   |  22  |  0.77    Ca    9.1      18 Sep 2023 04:43  Phos  2.1     09-18  Mg     2.6     09-18    TPro  6.7  /  Alb  3.7  /  TBili  1.1  /  DBili  x   /  AST  37  /  ALT  9<L>  /  AlkPhos  53  09-18      Urinalysis Basic - ( 18 Sep 2023 04:43 )    Color: x / Appearance: x / SG: x / pH: x  Gluc: 222 mg/dL / Ketone: x  / Bili: x / Urobili: x   Blood: x / Protein: x / Nitrite: x   Leuk Esterase: x / RBC: x / WBC x   Sq Epi: x / Non Sq Epi: x / Bacteria: x      RADIOLOGY & ADDITIONAL TESTS:   CT Head No Cont (09.16.23 @ 13:46) >  IMPRESSION:  1.  Redemonstrated infarct in the left MCA distribution, more evolved   when compared prior imaging. There is a bright left MCA sign in the   distal left M1/proximal M2 segment, compatible with a probable thrombus.    2.  Decreased gray-white matter discrimination in the inferior left   temporal lobe and inferior left occipital lobe, more conspicuous when   compared prior imaging. Finding is likely compatible with a developing   left PCA distribution infarct. Given findings of multiple vascular   distributions, a embolic source should be considered.    3.  Faint increased attenuation along the sulci of the superiormargin of   the infarcted left MCA territory. This could represent small petechial   hemorrhages versus trace subarachnoid blood.     Neurology Stroke Progress Note    INTERVAL HPI/OVERNIGHT EVENTS:  Patient seen and examined at bedside with family present. Unchanged EEG with focal slowing suggestive of focal cerebral dysfunction in the left frontotemporal areas. EEG discontinued. 100.2 rectal probe temp at 10am    MEDICATIONS  (STANDING):  atorvastatin 80 milliGRAM(s) Oral at bedtime  cefTRIAXone   IVPB 1000 milliGRAM(s) IV Intermittent every 24 hours  chlorhexidine 0.12% Liquid 15 milliLiter(s) Oral Mucosa every 12 hours  chlorhexidine 2% Cloths 1 Application(s) Topical <User Schedule>  dextrose 5%. 1000 milliLiter(s) (100 mL/Hr) IV Continuous <Continuous>  dextrose 5%. 1000 milliLiter(s) (50 mL/Hr) IV Continuous <Continuous>  dextrose 50% Injectable 25 Gram(s) IV Push once  dextrose 50% Injectable 25 Gram(s) IV Push once  dextrose 50% Injectable 12.5 Gram(s) IV Push once  glucagon  Injectable 1 milliGRAM(s) IntraMuscular once  insulin lispro (ADMELOG) corrective regimen sliding scale   SubCutaneous every 6 hours  sodium chloride 3%. 500 milliLiter(s) (25 mL/Hr) IV Continuous <Continuous>    MEDICATIONS  (PRN):  dextrose Oral Gel 15 Gram(s) Oral once PRN Blood Glucose LESS THAN 70 milliGRAM(s)/deciliter    Allergies  No Known Allergies  Intolerances    Vital Signs Last 24 Hrs  T(C): 38.1 (18 Sep 2023 12:50), Max: 38.1 (18 Sep 2023 12:50)  T(F): 100.6 (18 Sep 2023 12:50), Max: 100.6 (18 Sep 2023 12:50)  HR: 102 (18 Sep 2023 13:46) (88 - 125)  BP: 169/89 (18 Sep 2023 13:00) (103/65 - 169/89)  BP(mean): 119 (18 Sep 2023 13:00) (80 - 120)  RR: 20 (18 Sep 2023 13:00) (14 - 23)  SpO2: 100% (18 Sep 2023 13:46) (98% - 100%)    Parameters below as of 18 Sep 2023 13:00  Patient On (Oxygen Delivery Method): ventilator, CPAP    O2 Concentration (%): 35    Physical exam:  General: No acute distress, awake and alert  Eyes: Anicteric sclerae, moist conjunctivae, see below for CNs  Neck: trachea midline, FROM, supple, no thyromegaly or lymphadenopathy  Cardiovascular: Regular rate and rhythm, no murmurs, rubs, or gallops. No carotid bruits.   Pulmonary: Anterior breath sounds clear bilaterally, no crackles or wheezing. No use of accessory muscles  GI: Abdomen soft, non-distended, non-tender  Extremities: Radial and DP pulses +2, no edema    Neurologic:  -Mental status: AAOx0; intubated; Attempts to open eyes after eyes held open for pupil examination; does not follow commands in [place] (by family)  -Cranial nerves:   II: BTT not intact in R visual field of R eye  III, IV, VI: Pupils equally round and reactive to light; unable to test EOM  V:  Unable to test facial sensation; Corneal reflex intact  VII:  Right facial droop   VIII: Unable to test hearing  Motor: LUE: 3/5 spontaneously and to noxious; LLE: 2/5 to noxious  RUE: 0/5   RLE: 1/5 to noxious     Sensation: localizes noxious stimulus with sternal rub with LUE; withdraws to pain in LUE/LLE and RLE, not RUE.   Coordination: Unable to test      LABS:                        13.2   15.07 )-----------( 176      ( 18 Sep 2023 04:43 )             40.5     09-18    149<H>  |  119<H>  |  33<H>  ----------------------------<  222<H>  4.1   |  22  |  0.77    Ca    9.1      18 Sep 2023 04:43  Phos  2.1     09-18  Mg     2.6     09-18    TPro  6.7  /  Alb  3.7  /  TBili  1.1  /  DBili  x   /  AST  37  /  ALT  9<L>  /  AlkPhos  53  09-18      Urinalysis Basic - ( 18 Sep 2023 04:43 )    Color: x / Appearance: x / SG: x / pH: x  Gluc: 222 mg/dL / Ketone: x  / Bili: x / Urobili: x   Blood: x / Protein: x / Nitrite: x   Leuk Esterase: x / RBC: x / WBC x   Sq Epi: x / Non Sq Epi: x / Bacteria: x      RADIOLOGY & ADDITIONAL TESTS:   CT Head No Cont (09.16.23 @ 13:46) >  IMPRESSION:  1.  Redemonstrated infarct in the left MCA distribution, more evolved   when compared prior imaging. There is a bright left MCA sign in the   distal left M1/proximal M2 segment, compatible with a probable thrombus.    2.  Decreased gray-white matter discrimination in the inferior left   temporal lobe and inferior left occipital lobe, more conspicuous when   compared prior imaging. Finding is likely compatible with a developing   left PCA distribution infarct. Given findings of multiple vascular   distributions, a embolic source should be considered.    3.  Faint increased attenuation along the sulci of the superiormargin of   the infarcted left MCA territory. This could represent small petechial   hemorrhages versus trace subarachnoid blood.

## 2023-09-18 NOTE — PROGRESS NOTE ADULT - SUBJECTIVE AND OBJECTIVE BOX
HOSPITAL COURSE:  87yo female with well controlled HTN, HLD and T2DM presents to ED as a tx from [facility] i/s/o suspected MCA stroke. Pt had stroke-like symptoms a couple of weeks prior with no significant deficits noted at that time and patient was DCed home on aspirin. Today, patient not answering phone and found down by neighbor in wellness check in pool of urine.  BIBEMS to [facility] where she was intubated for airway protection and then subsequently transferred to [facility] for possible neurosurgical intervention. Per family at bedside, patient a normally active and functional and ambulating multiple city blocks at a time. Prior to stroke 2-3 weeks before, patient otherwise fully functional, living alone with well controlled comorbidities. CT of the head - infarct in MCA distribution, developing left PCA distribution infarct. Patient given hypertonic saline with NA goal 145-155 to prevent interval intracranial edema. SBP goal <180.     INTERVAL HPI/OVERNIGHT EVENTS:   No overnight events. AM Na 149, d/c'd 3%. Mild fever 100.6 likely 2/2 to neurogenic injury, hemodynamically stable     Subjective: Patient seen and examined at bedside. Stroke rec repeat CT head tday. Discussion ongoing w/ family currently full code, but leaning towards comfort.  Afib 120's will hold off unless pt becomes uncontrolled agian. Patient responsive to painful stimuli in 3/4 extremities. Pupils reactive to light, moves spontaneously occasionally      ICU Vital Signs Last 24 Hrs  T(C): 38.1 (18 Sep 2023 12:50), Max: 38.1 (18 Sep 2023 12:50)  T(F): 100.6 (18 Sep 2023 12:50), Max: 100.6 (18 Sep 2023 12:50)  HR: 103 (18 Sep 2023 12:00) (88 - 144)  BP: 154/92 (18 Sep 2023 12:00) (103/65 - 162/102)  BP(mean): 117 (18 Sep 2023 12:00) (80 - 126)  RR: 20 (18 Sep 2023 12:00) (14 - 26)  SpO2: 100% (18 Sep 2023 12:00) (98% - 100%)    O2 Parameters below as of 18 Sep 2023 12:00  Patient On (Oxygen Delivery Method): ventilator, CPAP    O2 Concentration (%): 35    I&O's Summary    17 Sep 2023 07:01  -  18 Sep 2023 07:00  --------------------------------------------------------  IN: 655 mL / OUT: 885 mL / NET: -230 mL    18 Sep 2023 07:01  -  18 Sep 2023 13:23  --------------------------------------------------------  IN: 475 mL / OUT: 185 mL / NET: 290 mL      Mode: CPAP with PS  FiO2: 35  PEEP: 5  PS: 5  MAP: 6.6  PIP: 10      PHYSICAL EXAM:  General: thin; frail  HEENT: NC/AT; PERRL  Neck: intubated   Cardiovascular: RRR, +S1/S2; NO M/R/G  Respiratory: CTA B/L; no W/R/R  Gastrointestinal: soft, NT/ND; +BSx4  Extremities: WWP; no edema or cyanosis  Vascular: 2+ radial, DP/PT pulses B/L  Neurological: AAOx0; intubated. Pt occasionally opens eyes and tracks. Withdraws to painful stimuli in 3/4 extremities (not R arm)    LABS:                        13.2   15.07 )-----------( 176      ( 18 Sep 2023 04:43 )             40.5     09-18    149<H>  |  119<H>  |  33<H>  ----------------------------<  222<H>  4.1   |  22  |  0.77    Ca    9.1      18 Sep 2023 04:43  Phos  2.1     09-18  Mg     2.6     09-18    TPro  6.7  /  Alb  3.7  /  TBili  1.1  /  DBili  x   /  AST  37  /  ALT  9<L>  /  AlkPhos  53  09-18      Urinalysis Basic - ( 18 Sep 2023 04:43 )    Color: x / Appearance: x / SG: x / pH: x  Gluc: 222 mg/dL / Ketone: x  / Bili: x / Urobili: x   Blood: x / Protein: x / Nitrite: x   Leuk Esterase: x / RBC: x / WBC x   Sq Epi: x / Non Sq Epi: x / Bacteria: x      CAPILLARY BLOOD GLUCOSE      POCT Blood Glucose.: 235 mg/dL (18 Sep 2023 11:14)  POCT Blood Glucose.: 195 mg/dL (18 Sep 2023 07:10)  POCT Blood Glucose.: 151 mg/dL (17 Sep 2023 17:37)        Consultant(s) Notes Reviewed:  [x ] YES  [ ] NO    MEDICATIONS  (STANDING):  atorvastatin 80 milliGRAM(s) Oral at bedtime  cefTRIAXone   IVPB 1000 milliGRAM(s) IV Intermittent every 24 hours  chlorhexidine 0.12% Liquid 15 milliLiter(s) Oral Mucosa every 12 hours  chlorhexidine 2% Cloths 1 Application(s) Topical <User Schedule>  dextrose 5%. 1000 milliLiter(s) (100 mL/Hr) IV Continuous <Continuous>  dextrose 5%. 1000 milliLiter(s) (50 mL/Hr) IV Continuous <Continuous>  dextrose 50% Injectable 25 Gram(s) IV Push once  dextrose 50% Injectable 25 Gram(s) IV Push once  dextrose 50% Injectable 12.5 Gram(s) IV Push once  glucagon  Injectable 1 milliGRAM(s) IntraMuscular once  insulin lispro (ADMELOG) corrective regimen sliding scale   SubCutaneous every 6 hours  sodium chloride 3%. 500 milliLiter(s) (25 mL/Hr) IV Continuous <Continuous>    MEDICATIONS  (PRN):  dextrose Oral Gel 15 Gram(s) Oral once PRN Blood Glucose LESS THAN 70 milliGRAM(s)/deciliter      Care Discussed with Consultants/Other Providers [ x] YES  [ ] NO    RADIOLOGY & ADDITIONAL TESTS:

## 2023-09-18 NOTE — DIETITIAN INITIAL EVALUATION ADULT - PERTINENT LABORATORY DATA
09-18    149<H>  |  119<H>  |  33<H>  ----------------------------<  222<H>  4.1   |  22  |  0.77    Ca    9.1      18 Sep 2023 04:43  Phos  2.1     09-18  Mg     2.6     09-18    TPro  6.7  /  Alb  3.7  /  TBili  1.1  /  DBili  x   /  AST  37  /  ALT  9<L>  /  AlkPhos  53  09-18  POCT Blood Glucose.: 235 mg/dL (09-18-23 @ 11:14)  A1C with Estimated Average Glucose Result: 6.4 % (09-17-23 @ 06:14)

## 2023-09-18 NOTE — DIETITIAN INITIAL EVALUATION ADULT - [id number]_GEN_A_CORE
09-17-23 @ 07:01  -  09-18-23 @ 07:00  --------------------------------------------------------  OUT:  Total OUT: 0 mL    Total NET: 280 mL      09-18-23 @ 07:01  -  09-18-23 @ 15:19  --------------------------------------------------------  OUT:  Total OUT: 0 mL    Total NET: 270 mL

## 2023-09-18 NOTE — PROGRESS NOTE ADULT - ASSESSMENT
89yo female with PMHx of HTN, HLD, T2DM presents to [facility] as a tx from [facility] i/s/o concern for large L sided MCA stroke. ICU consulted for further management.     Neuro  #MCA Stroke  #MCA Thrombus  Pt intubated @ [facility] i/s/o airway protection for suspected large L MCA stroke. CT brain stroke protocol showing opacified b/l MCAs L>R, cannot exclude thrombus. Per Neurosurgical team, CT brain perfusion showing infarction around MCA territory which is possibly too substantial to allow for adequate brain recovery even if intervention is done on thrombus.  Per Neurosurg, no plan for intervention on thrombus and MCA stroke expected to enlarge with larger infarction to be expected with possible cerebral edema however no edema witnessed on scans at this time.     - will wean sedation to see neurologic status -- patient spontaneously moving b/l LE and LUE however no movement seen in RUE;  - strict BP control w/ SBP <180  - Na goal 145-155 i/s/o stroke -- will check with BID BMPs per neurosurgery  - No ASA per neurosurgery  - Palliative consulted i/s/o possible worsening infarction with no intervention -- family support needed   - As per Neuro, Non con Head CT today    Cards  #Afib  New onset Afib with HR 120s. Patient bradycardiac over last two days  - Hold off on medications but can give diltiazem 5mg if pt becomes uncontrolled    #Hx of HTN  #BP Control  Will exhibit strict BP control i/s/o stroke. BPs in ED labile ranging from -190s.     Respiratory  #AHRF i/s/o MCA Stroke w/ Asp Pna   CT Chest showing signs of possible aspiration pna i/s/o intubation. WBC 12 however likely reactive i/s/o stroke. Afebrile.   - will obtain sputum cx  - aspiration precautions   - CTX 1g q24 7 days (through 9/23)    Renal  #Permissive Hypernatremia i/s/o MCA Infarct   Na 136 on admission. Per protocol, NaCl 3% 100cc bolus.    Na -- goal 145-155    #Indwelling mathew  - monitor urine output -- placed in MICU    Heme  #Elevated Hgb  HGB 15 on admission likely i/s/o hemoconcentration.   - f/u repeat on AM labs    Endo  #Hx of T2DM  Hx of T2DM. A1c and home medications unknown.  - f/u A1c in AM  - started Kaiden     ID  MICHELLE    Prophylaxis:  - DVT: SCDs   - GI: none

## 2023-09-18 NOTE — DIETITIAN INITIAL EVALUATION ADULT - NS FNS DIET ORDER
Diet, NPO with Tube Feed:   Tube Feeding Modality: Nasogastric  Jevity 1.2 Moses (JEVITY1.2RTH)  Continuous  Starting Tube Feed Rate {mL per Hour}: 10  Increase Tube Feed Rate by (mL): 10     Every 4 hours  Until Goal Tube Feed Rate (mL per Hour): 45  Tube Feed Duration (in Hours): 24  Tube Feed Start Time: 18:00  Liquid Protein Supplement     Qty per Day:  2 (09-17-23 @ 17:53)

## 2023-09-19 NOTE — PROGRESS NOTE ADULT - ASSESSMENT
89yo female with PMHx of HTN, HLD, T2DM presents to [facility] as a tx from [facility] i/s/o concern for large L sided MCA stroke. ICU consulted for further management.     Neuro  #MCA Stroke  #MCA Thrombus  Pt intubated @ [facility] i/s/o airway protection for suspected large L MCA stroke. CT brain stroke protocol showing opacified b/l MCAs L>R, cannot exclude thrombus. Per Neurosurgical team, CT brain perfusion showing infarction around MCA territory which is possibly too substantial to allow for adequate brain recovery even if intervention is done on thrombus.  Per Neurosurg, no plan for intervention on thrombus and MCA stroke expected to enlarge with larger infarction to be expected with possible cerebral edema however no edema witnessed on scans at this time.     - will wean sedation to see neurologic status -- patient spontaneously moving b/l LE and LUE however no movement seen in RUE;  - strict BP control w/ SBP <180  - Na goal 145-155 i/s/o stroke -- will check with BID BMPs per neurosurgery  - No ASA per neurosurgery  - Palliative consulted i/s/o possible worsening infarction with no intervention -- family support needed   - As per Neuro, Non con Head CT today    Cards  #Afib  New onset Afib with HR 120s. Patient bradycardiac over last two days  - Hold off on medications but can give diltiazem 5mg if pt becomes uncontrolled    #Hx of HTN  #BP Control  Will exhibit strict BP control i/s/o stroke. BPs in ED labile ranging from -190s.     Respiratory  #AHRF i/s/o MCA Stroke w/ Asp Pna   CT Chest showing signs of possible aspiration pna i/s/o intubation. WBC 12 however likely reactive i/s/o stroke. Afebrile.   - will obtain sputum cx  - aspiration precautions   - switch to Ertapenam 1000mg IVP now that sensitivities are back    Renal  #Permissive Hypernatremia i/s/o MCA Infarct   Na 136 on admission. Per protocol, NaCl 3% 100cc bolus.    Na -- goal 145-155    #Indwelling mathew  - monitor urine output -- placed in MICU    Heme  #Elevated Hgb  HGB 15 on admission likely i/s/o hemoconcentration.   - f/u repeat on AM labs    Endo  #Hx of T2DM  Hx of T2DM. A1c and home medications unknown.  - f/u A1c in AM  - started Kaiden     ID  MICHELLE    Prophylaxis:  - DVT: SCDs   - GI: none      87yo female with PMHx of HTN, HLD, T2DM presents to [facility] as a tx from [facility] i/s/o concern for large L sided MCA stroke. ICU consulted for further management.     Neuro  #MCA Stroke  #MCA Thrombus  Pt intubated @ [facility] i/s/o airway protection for suspected large L MCA stroke. CT brain stroke protocol showing opacified b/l MCAs L>R, cannot exclude thrombus. Per Neurosurgical team, CT brain perfusion showing infarction around MCA territory which is possibly too substantial to allow for adequate brain recovery even if intervention is done on thrombus.  Per Neurosurg, no plan for intervention on thrombus and MCA stroke expected to enlarge with larger infarction to be expected with possible cerebral edema however no edema witnessed on scans at this time.     - will wean sedation to see neurologic status -- patient spontaneously moving b/l LE and LUE however no movement seen in RUE;  - strict BP control w/ SBP <180  - Na goal 145-155 i/s/o stroke -- will check with BID BMPs per neurosurgery  - No ASA per neurosurgery  - Palliative consulted i/s/o possible worsening infarction with no intervention -- family support needed   - As per Neuro, Non con Head CT today    Cards  #Afib  New onset Afib with HR 120s. Patient bradycardiac over last two days  - Hold off on medications but can give diltiazem 5mg if pt becomes uncontrolled    #Hx of HTN  #BP Control  Will exhibit strict BP control i/s/o stroke. BPs in ED labile ranging from -190s.     Respiratory  #AHRF i/s/o MCA Stroke w/ Asp Pna   CT Chest showing signs of possible aspiration pna i/s/o intubation. WBC 12 however likely reactive i/s/o stroke. Afebrile.   - will obtain sputum cx  - aspiration precautions   - switch to Ertapenam 1000mg IVP now that sensitivities are back    Renal  #Permissive Hypernatremia i/s/o MCA Infarct   Na 136 on admission. Per protocol, NaCl 3% 100cc bolus.    Na -- goal 145-155    #Indwelling mathew  - monitor urine output -- placed in MICU    Heme  #Elevated Hgb  HGB 15 on admission likely i/s/o hemoconcentration.   - f/u repeat on AM labs    Endo  #Hx of T2DM  Hx of T2DM. A1c and home medications unknown.  - f/u A1c in AM  - started Kaiden     ID  See pulm    Prophylaxis:  - DVT: SCDs   - GI: none

## 2023-09-19 NOTE — PROGRESS NOTE ADULT - SUBJECTIVE AND OBJECTIVE BOX
HOSPITAL COURSE:  89yo female with well controlled HTN, HLD and T2DM presents to ED as a tx from [facility] i/s/o suspected MCA stroke. Pt had stroke-like symptoms a couple of weeks prior with no significant deficits noted at that time and patient was DCed home on aspirin. Today, patient not answering phone and found down by neighbor in wellness check in pool of urine.  BIBEMS to [facility] where she was intubated for airway protection and then subsequently transferred to [facility] for possible neurosurgical intervention. Per family at bedside, patient a normally active and functional and ambulating multiple city blocks at a time. Prior to stroke 2-3 weeks before, patient otherwise fully functional, living alone with well controlled comorbidities. CT of the head - infarct in MCA distribution, developing left PCA distribution infarct. Patient given hypertonic saline with NA goal 145-155, Midodrine 50g, EtCO2 30-35, head of the bed elevated to 45, to prevent interval intracranial edema. SBP goal <180.     INTERVAL HPI/OVERNIGHT EVENTS:   No overnight events. AM Na 150, d/c'd 3%. Mild fever 100.6 likely 2/2 to aspiration pneumonia    Subjective: Patient seen and examined at bedside. Stroke rec repeat CT head tday. Discussion ongoing w/ family currently full code, but leaning towards comfort.  Afib 120's will hold off unless pt becomes uncontrolled agian. Patient responsive to painful stimuli in 4/4 extremities. Pupils reactive to light, moves spontaneously occasionally      ICU Vital Signs Last 24 Hrs  T(C): 37.9 (19 Sep 2023 13:54), Max: 38.2 (19 Sep 2023 01:03)  T(F): 100.2 (19 Sep 2023 13:54), Max: 100.8 (19 Sep 2023 01:03)  HR: 101 (19 Sep 2023 14:00) (89 - 122)  BP: 144/98 (19 Sep 2023 14:00) (111/74 - 168/89)  BP(mean): 117 (19 Sep 2023 14:00) (84 - 126)  RR: 15 (19 Sep 2023 14:00) (13 - 27)  SpO2: 100% (19 Sep 2023 14:00) (99% - 100%)    O2 Parameters below as of 19 Sep 2023 12:41  Patient On (Oxygen Delivery Method): ventilator    O2 Concentration (%): 35    PHYSICAL EXAM:  General: thin; frail  HEENT: NC/AT; PERRL  Neck: intubated   Cardiovascular: Irregular rate w/ normal rhythym, +S1/S2; NO M/R/G  Respiratory: CTA B/L; no W/R/R  Gastrointestinal: soft, NT/ND; +BSx4  Extremities: WWP; no edema or cyanosis  Vascular: 2+ radial, DP/PT pulses B/L  Neurological: AAOx0; intubated. Pt occasionally opens eyes and tracks. Withdraws to painful stimuli in 4/4 extremities     I&O's Summary    18 Sep 2023 07:01  -  19 Sep 2023 07:00  --------------------------------------------------------  IN: 965 mL / OUT: 1745 mL / NET: -780 mL    19 Sep 2023 07:01  -  19 Sep 2023 14:33  --------------------------------------------------------  IN: 100 mL / OUT: 268 mL / NET: -168 mL      Mode: AC/ CMV (Assist Control/ Continuous Mandatory Ventilation)  RR (machine): 12  TV (machine): 300  FiO2: 35  PEEP: 5  ITime: 1  MAP: 7  PIP: 14      LABS:                        13.8   12.28 )-----------( 185      ( 19 Sep 2023 04:45 )             42.8     09-19    150<H>  |  121<H>  |  24<H>  ----------------------------<  182<H>  3.8   |  23  |  0.65    Ca    9.1      19 Sep 2023 04:45  Phos  2.2     09-19  Mg     2.5     09-19    TPro  7.0  /  Alb  3.5  /  TBili  0.8  /  DBili  x   /  AST  41<H>  /  ALT  8<L>  /  AlkPhos  58  09-19      Urinalysis Basic - ( 19 Sep 2023 04:45 )    Color: x / Appearance: x / SG: x / pH: x  Gluc: 182 mg/dL / Ketone: x  / Bili: x / Urobili: x   Blood: x / Protein: x / Nitrite: x   Leuk Esterase: x / RBC: x / WBC x   Sq Epi: x / Non Sq Epi: x / Bacteria: x      CAPILLARY BLOOD GLUCOSE      POCT Blood Glucose.: 158 mg/dL (19 Sep 2023 11:23)  POCT Blood Glucose.: 146 mg/dL (18 Sep 2023 16:49)        Consultant(s) Notes Reviewed:  [x ] YES  [ ] NO    MEDICATIONS  (STANDING):  atorvastatin 80 milliGRAM(s) Oral at bedtime  chlorhexidine 0.12% Liquid 15 milliLiter(s) Oral Mucosa every 12 hours  chlorhexidine 2% Cloths 1 Application(s) Topical <User Schedule>  dextrose 5%. 1000 milliLiter(s) (50 mL/Hr) IV Continuous <Continuous>  dextrose 5%. 1000 milliLiter(s) (100 mL/Hr) IV Continuous <Continuous>  dextrose 50% Injectable 25 Gram(s) IV Push once  dextrose 50% Injectable 25 Gram(s) IV Push once  dextrose 50% Injectable 12.5 Gram(s) IV Push once  glucagon  Injectable 1 milliGRAM(s) IntraMuscular once  insulin lispro (ADMELOG) corrective regimen sliding scale   SubCutaneous every 6 hours  levETIRAcetam  IVPB 500 milliGRAM(s) IV Intermittent every 12 hours    MEDICATIONS  (PRN):  dextrose Oral Gel 15 Gram(s) Oral once PRN Blood Glucose LESS THAN 70 milliGRAM(s)/deciliter      Care Discussed with Consultants/Other Providers [ x] YES  [ ] NO    RADIOLOGY & ADDITIONAL TESTS:

## 2023-09-19 NOTE — CONSULT NOTE ADULT - PROBLEM SELECTOR RECOMMENDATION 4
Following for GOC. Will continue to follow.    Saima Knapp MD  Palliative Care Attending  Geriatrics and Palliative Consult Service

## 2023-09-19 NOTE — CONSULT NOTE ADULT - CONVERSATION DETAILS
Discussed patient's overall medical condition and her poor prognosis given extend of infarct and findings on CT scan. Both patient's daughter and son-in-law were having difficulty with the sudden change from the stroke and the lack of interventions available to improve her condition. We discussed what she enjoyed doing and He explained that her mother was very active, didn't like to just sit at home all day. I explained that given the territory of the stroke, she would never be independent again and would likely need a tracheostomy and a peg tube. Her daughter stated that she wouldn't want that but began crying and returned the conversation to wondering what they could have done to prevent this and how her life has so drastically changed. We discussed her quality of life moving forward and her daughter felt she would not want to live this way so I spoke about how if that is the case, we should consider focusing on her comfort and taking off some of the machines she is on. Her daughter again cried and said she needed to discuss with her family and mentioned that her step-father, her mom's , has been having a difficult time coping with the stress of the situation. I explained that any decisions would have to include him and she asked to give her time to talk with her family before we reached out to him.

## 2023-09-19 NOTE — CONSULT NOTE ADULT - SUBJECTIVE AND OBJECTIVE BOX
HPI:  HPI: 87yo female with well controlled HTN, HLD and T2DM presents to ED as a tx from [facility] i/s/o suspected MCA stroke. Pt had stroke-like symptoms a couple of weeks prior with no significant deficits noted at that time and patient was DCed home on aspirin. Today, patient not answering phone and found down by neighbor in wellness check in pool of urine.  BIBEMS to [facility] where she was intubated for airway protection and then subsequently transferred to [facility] for possible neurosurgical intervention.     Per family at bedside, patient a normally active and functional and ambulating multiple city blocks at a time. Prior to stroke 2-3 weeks before, patient otherwise fully functional, living alone with well controlled comorbidities.      12 Point ROS Negative unless noted otherwise above. (16 Sep 2023 02:37)    PERTINENT PM/SXH:   HTN, DM2    FAMILY HISTORY:  No history of stroke in first degree relatives according to chart  Unable to obtain due to patient's encephalopathy    ITEMS NOT CHECKED ARE NOT PRESENT  SOCIAL HISTORY:   Significant other/partner:  [X]  Children:  [X]   Substance hx:  []   Tobacco hx:  []   Alcohol hx: []   Home Opioid hx:  [] I-Stop Reference No:  - no active Rx's / see chart note  Living Situation: [X]Home  []Long term care  []Rehab []Other  Quaker/Spiritual practice: ; Role of organized Congregation [ ] important [ ] some [X] unable to assess  Coping: [] well [] with difficulty [] poor coping [X] unable to assess  Support system: [X] strong [] adequate [] inadequate    ADVANCE DIRECTIVES:    []MOLST  []Living Will  DECISION MAKER(s):  [] Health Care Proxy(s)  [] Surrogate(s)  [] Guardian           Name(s)/Phone Number(s):     BASELINE (I)ADLs (prior to admission):  Blaine: [X]Total  [] Moderate []Dependent    ALLERGIES:  No Known Allergies    MEDICATIONS  (STANDING):  atorvastatin 80 milliGRAM(s) Oral at bedtime  chlorhexidine 0.12% Liquid 15 milliLiter(s) Oral Mucosa every 12 hours  chlorhexidine 2% Cloths 1 Application(s) Topical <User Schedule>  dextrose 5%. 1000 milliLiter(s) (50 mL/Hr) IV Continuous <Continuous>  dextrose 5%. 1000 milliLiter(s) (100 mL/Hr) IV Continuous <Continuous>  dextrose 50% Injectable 25 Gram(s) IV Push once  dextrose 50% Injectable 25 Gram(s) IV Push once  dextrose 50% Injectable 12.5 Gram(s) IV Push once  glucagon  Injectable 1 milliGRAM(s) IntraMuscular once  insulin lispro (ADMELOG) corrective regimen sliding scale   SubCutaneous every 6 hours  levETIRAcetam  IVPB 500 milliGRAM(s) IV Intermittent every 12 hours  mannitol 20% IVPB 50 Gram(s) IV Intermittent every 6 hours    MEDICATIONS  (PRN):  dextrose Oral Gel 15 Gram(s) Oral once PRN Blood Glucose LESS THAN 70 milliGRAM(s)/deciliter    PRESENT SYMPTOMS/REVIEW OF SYSTEMS: [X]Unable to obtain due to poor mentation/encephalopathy  Source if other than patient:  []Family   []Team     Pain: [] yes [] no  QOL Impact -   Location -                    Aggravating Factors -  Quality -  Radiation -  Timing -  Severity (0-10 scale) -   Minimal Acceptable Level (0-10 scale) -    Dyspnea:                           []Mild  []Moderate []Severe  Anxiety:                             []Mild []Moderate []Severe  Fatigue:                             []Mild []Moderate []Severe  Nausea:                             []Mild []Moderate []Severe  Loss of Appetite:              []Mild []Moderate []Severe  Constipation:                    []Mild []Moderate []Severe    Other Symptoms:  [x]All Other Review Of Systems Negative     Palliative Performance Status Version 2:  10%  (Functional Assessment Tool)    PHYSICAL EXAM:  GENERAL:  []Alert  []Oriented x   []Lethargic  []Cachexia  [X]Unarousable  []Verbal  []Non-Verbal  Behavioral:   []Anxiety  []Delirium []Agitation [X]Calm  HEENT:  []Normal   []Dry mouth   [X]ET Tube  []Oral lesions  PULMONARY:   [X]Clear []Tachypnea  []Audible excessive secretions  []Normal Work of Breathing []Labored Breathing  []Rhonchi []Crackles []Wheezing  CARDIOVASCULAR:    [X]Regular Rate & Rhythm []Irregular []Tachy  []Isma  GASTROINTESTINAL:  [X]Soft  []Distended   []+BS  [X]Non tender []Tender  []PEG []OGT/ NGT  Last BM:  GENITOURINARY:  []Normal [] Incontinent   []Oliguria/Anuria   [X]Carlos  MUSCULOSKELETAL:   []Normal   [X]Weakness  [X]Bed/Wheelchair bound []Edema  NEUROLOGIC:  []No focal deficits  [ ]Cognitive impairment  []Dysphagia []Dysarthria []Paresis [X]Encephalopathic  SKIN:   [X]Normal   []Pressure ulcer(s)  []Rash    CRITICAL CARE:  []Shock Present  []Septic []Cardiogenic []Neurologic []Hypovolemic  []Vasopressors []Inotropes   []Respiratory failure present []Mechanical Ventilation []Non-invasive ventilatory support []High-Flow  []Acute  []Chronic []Hypoxic  []Hypercarbic  []Other organ failure    Vital Signs Last 24 Hrs  T(C): 37.9 (19 Sep 2023 13:54), Max: 38.2 (19 Sep 2023 01:03)  T(F): 100.2 (19 Sep 2023 13:54), Max: 100.8 (19 Sep 2023 01:03)  HR: 112 (19 Sep 2023 16:00) (99 - 122)  BP: 142/99 (19 Sep 2023 16:00) (111/74 - 168/89)  BP(mean): 115 (19 Sep 2023 16:00) (84 - 125)  RR: 16 (19 Sep 2023 16:00) (14 - 27)  SpO2: 100% (19 Sep 2023 16:00) (100% - 100%)    Parameters below as of 19 Sep 2023 12:41  Patient On (Oxygen Delivery Method): ventilator    O2 Concentration (%): 35    LABS:                        13.8   12.28 )-----------( 185      ( 19 Sep 2023 04:45 )             42.8   09-19    156<H>  |  122<H>  |  31<H>  ----------------------------<  166<H>  3.8   |  24  |  0.71    Ca    9.2      19 Sep 2023 14:18  Phos  2.2     09-19  Mg     2.5     09-19    TPro  7.0  /  Alb  3.5  /  TBili  0.8  /  DBili  x   /  AST  41<H>  /  ALT  8<L>  /  AlkPhos  58  09-19    RADIOLOGY & ADDITIONAL STUDIES:  < from: CT Head No Cont (09.18.23 @ 16:16) >  ACC: [id number] EXAM:  CT BRAIN   ORDERED BY: CHRISTOPHER PRATT     PROCEDURE DATE:  09/18/2023          INTERPRETATION:  CT HEAD WITHOUT CONTRAST    INDICATIONS: Stroke    TECHNIQUE: Serial axial images were obtained from the skull base to the   vertex without the use of intravenous contrast. Sagittal and coronal   reformatted images were created from the axial data set. Images were   reviewed in the bone, brain and subdural windows.    COMPARISON: CT head 9/16/2023, 9/15/2023.    FINDINGS:  Since prior exam, there is interval evolution of the acute infarction in   the left MCA and PCA territories including the left basal ganglia. There   is increased mass effect on the left lateral ventricle with increased   left to right midline shift measuring8 mm previously 5 mm. There are   linear densities within the MCA territorial infarction which may   represent preserved cortex versus petechial hemorrhage. There is no   evidence of gross hemorrhagic transformation.    There is a new focal hypodensity within the left thalamus consistent with   infarction.    There is no acute calvarial fracture. The paranasal sinuses and mastoid   air cells are well-aerated.      IMPRESSION: Since prior CT head 9/16/2023, continued evolution of left   MCA and PCA territorial infarctions with increased mass effect and   midline shift measuring 8 mm previously 5 mm. No evidence of hemorrhagic   transformation.    Acute infarct in the left thalamus.    Findings were discussed with Dr. Pratt by Dr. Willy Geronimo on   9/18/2023 4:54 PM    --- End of Report ---    < end of copied text >    REFERRALS:  [x]Social Work  []Case management []PT/OT []Chaplaincy  []Hospice  []Patient/Family Support []Massage Therapy []Music Therapy    DISCUSSION OF CASE: Family - obtained additional history and to provide emotional support;  Primary team - discussed plan of care

## 2023-09-19 NOTE — PROGRESS NOTE ADULT - ASSESSMENT
INCOMPLETE 87yo Female with PMHX of HTN, HLD, T2DM, and TIA (2.5 weeks ago; pt with L sided weakness, was admitted to [place] and discharged on ASA without residual deficits) BIBEMS to [facility] ED as transfer from [facility] for L MCA stroke as possible thrombectomy candidate. Pt intubated at [place] and found to hyperdense L MCA on noncon CTH. NIHSS 32 on arrival. CTH with evidence of acute L MCA infarct, CTP with mismatch volume of 238cc with mismatch ratio 6.8 within L MCA territory, CTA H/N with left MCA bifurcation occlusion, free air in right carotid deep spaces likely due to traumatic intubation. Pt out of window for thrombolytic treatment. After discussion between Dr. Ontiveros and Dr. Plascencia, patient was deemed not a candidate for mechanical thrombectomy given ischemic changes along L MCA territory on CTH with large correlating core on CTP. Throughout admission Afib noted on the monitor. New-onset afib in RVR with heart rate >100, today up to 120-144. CTH on 9/18 with increase in mass shift 5 to 8mm, 3% started as well as 50mg mannitol 20%. Today, Na: 150 this Am and Na:156 with afternoon BMP. Repeat CTH today with stable mass effect. Neuro ICU consulted for recommendations 3%/mannitol.     Stroke etiology likely embolic source (cardioembolic vs hypercoagulable state), fetal PCA noted on CTA possibly explaining multiple territory CVA    Cytotoxic swelling management ( peak swelling 3-5 days after CVA)  - Q6 BMP and osm checks  - Give 1g/kg mannitol if osm <320  - Restart 3% saline if needed to keep Na goal 145-155 to prevent interval intracranial edema   - Repeat CTH tomorrow AM  - NRSGY following for enmanuel-crani watch, f/u recs  - If any questions overnight about hyperosmotic/hypetonic therapy, please reach out to neuro ICU on call    CVA ( L MCA and L PCA infarct)  - Hold AC/AP at this time due to increased risk of hemorrhagic conversion given large infarct  - continue Atorvastatin 80mg PO daily  - recommend SBP goal <180  - recommend q1hr coma checks and vitals   - provide stroke education  - obtain collateral from [facility]     Seizure-like activity   - Continue Keppra 500mg BID; lip twitching noted on exam    Palliative care consulted today, will follow recs; GOC discussion ongoing with family; Pt full code     Discussed with Neurology Fellow Dr. Elizondo and Attending Dr. Mustafa 89yo Female with PMHX of HTN, HLD, T2DM, and TIA (2.5 weeks ago; pt with L sided weakness, was admitted to [place] and discharged on ASA without residual deficits) BIBEMS to [facility] ED as transfer from [facility] for L MCA stroke as possible thrombectomy candidate. Pt intubated at [place] and found to hyperdense L MCA on noncon CTH. NIHSS 32 on arrival. CTH with evidence of acute L MCA infarct, CTP with mismatch volume of 238cc with mismatch ratio 6.8 within L MCA territory, CTA H/N with left MCA bifurcation occlusion, free air in right carotid deep spaces likely due to traumatic intubation. Pt out of window for thrombolytic treatment. After discussion between Dr. Ontiveros and Dr. Plascencia, patient was deemed not a candidate for mechanical thrombectomy given ischemic changes along L MCA territory on CTH with large correlating core on CTP. Throughout admission Afib noted on the monitor. New-onset afib in RVR with heart rate >100, today up to 120-144. CTH on 9/18 with increase in mass shift 5 to 8mm, 3% started as well as 50mg mannitol 20%. Today, Na: 150 this Am and Na:156 with afternoon BMP. Repeat CTH today with stable mass effect. Neuro ICU consulted for recommendations 3%/mannitol.     Stroke etiology likely embolic source (cardioembolic vs hypercoagulable state), fetal PCA noted on CTA possibly explaining multiple territory CVA    Cytotoxic swelling management ( peak swelling 3-5 days after CVA)  - Q6 BMP and osm checks  - Give 1g/kg mannitol if osm <320  - Restart 3% saline if needed to keep Na goal 145-155 to prevent interval intracranial edema   - Repeat CTH tomorrow AM  - NRSGY following for enmanuel-crani watch, f/u recs  - If any questions overnight about hyperosmotic/hypertonic therapy, please reach out to neuro ICU on call    CVA ( L MCA and L PCA infarct)  - Start ASA 81mg   - continue Atorvastatin 80mg PO daily  - recommend SBP goal <180  - recommend q1hr coma checks and vitals   - provide stroke education  - obtain collateral from [facility]     Seizure-like activity   - Continue Keppra 500mg BID; lip twitching noted on exam    Palliative care consulted today, will follow recs; GOC discussion ongoing with family; Pt full code     Discussed with Neurology Fellow Dr. Elizondo and Attending Dr. Mustafa 89yo Female with PMHX of HTN, HLD, T2DM, and TIA (2.5 weeks ago; pt with L sided weakness, was admitted to [place] and discharged on ASA without residual deficits) BIBEMS to [facility] ED as transfer from [facility] for L MCA stroke as possible thrombectomy candidate. Pt intubated at [place] and found to hyperdense L MCA on noncon CTH. NIHSS 32 on arrival. CTH with evidence of acute L MCA infarct, CTP with mismatch volume of 238cc with mismatch ratio 6.8 within L MCA territory, CTA H/N with left MCA bifurcation occlusion, free air in right carotid deep spaces likely due to traumatic intubation. Pt out of window for thrombolytic treatment. After discussion between Dr. Ontiveros and Dr. Plascencia, patient was deemed not a candidate for mechanical thrombectomy given ischemic changes along L MCA territory on CTH with large correlating core on CTP. Throughout admission Afib noted on the monitor. New-onset afib in RVR with heart rate >100, today up to 120-144. CTH on 9/18 with increase in mass shift 5 to 8mm, 3% started as well as 50mg mannitol 20%. Today, Na: 150 this Am and Na:156 with afternoon BMP. Repeat CTH today with stable mass effect. Neuro ICU consulted for recommendations 3%/mannitol.     Stroke etiology likely embolic source (cardioembolic vs hypercoagulable state), fetal PCA noted on CTA possibly explaining multiple territory CVA    Rec:   Cytotoxic swelling management ( peak swelling 3-5 days after CVA)  - Q6 BMP and osm checks  - Give 1g/kg mannitol if osm <320  - Restart 3% saline if needed to keep Na goal 145-155 to prevent interval intracranial edema   - Repeat CTH tomorrow AM  - NRSGY following for enmanuel-crani watch, f/u recs  - If any questions overnight about hyperosmotic/hypertonic therapy, please reach out to neuro ICU on call    CVA ( L MCA and L PCA infarct)  - ASA 81mg (holding)  - continue Atorvastatin 80mg PO daily  - recommend SBP goal <180  - recommend q1hr coma checks and vitals   - provide stroke education  - obtain collateral from [facility]     Seizure-like activity   - Continue Keppra 500mg BID; lip twitching noted on exam    Palliative care consulted today, will follow recs; GOC discussion ongoing with family; Pt full code     Discussed with Neurology Fellow Dr. Elizondo and Attending Dr. Mustafa

## 2023-09-19 NOTE — PROGRESS NOTE ADULT - NSPROGADDITIONALINFOA_GEN_ALL_CORE
Recommendations see above: reviewed and edited as needed.  On 9/19/23 PM, called daughter for any question. Said all questions and concerns were addressed. Said overwhelmed     Ugo Elizondo MD PHD  Vascular neurology fellow

## 2023-09-19 NOTE — CONSULT NOTE ADULT - PROBLEM SELECTOR RECOMMENDATION 9
CT brain stroke protocol showing opacified b/l MCAs L>R, cannot exclude thrombus. Per Neurosurgical team, CT brain perfusion showing infarction around MCA territory which is possibly too substantial to allow for adequate brain recovery even if intervention is done on thrombus.  - GOC ongoing. Family still trying to come to terms with new

## 2023-09-19 NOTE — CONSULT NOTE ADULT - ASSESSMENT
87yo female with PMHx of HTN, HLD, T2DM presents to [facility] as a tx from [facility] i/s/o concern for large L sided MCA stroke. ICU consulted for further management.

## 2023-09-19 NOTE — PROGRESS NOTE ADULT - SUBJECTIVE AND OBJECTIVE BOX
INCOMPLETE    Neurology Stroke Progress Note    INTERVAL HPI/OVERNIGHT EVENTS:  Patient seen and examined at bedside. Rectal temp of 100.2 this AM. Actively in Afib sine arrival HR tachycardic to 102-122.     MEDICATIONS  (STANDING):  atorvastatin 80 milliGRAM(s) Oral at bedtime  cefTRIAXone   IVPB 1000 milliGRAM(s) IV Intermittent every 24 hours  chlorhexidine 0.12% Liquid 15 milliLiter(s) Oral Mucosa every 12 hours  chlorhexidine 2% Cloths 1 Application(s) Topical <User Schedule>  dextrose 5%. 1000 milliLiter(s) (50 mL/Hr) IV Continuous <Continuous>  dextrose 5%. 1000 milliLiter(s) (100 mL/Hr) IV Continuous <Continuous>  dextrose 50% Injectable 25 Gram(s) IV Push once  dextrose 50% Injectable 25 Gram(s) IV Push once  dextrose 50% Injectable 12.5 Gram(s) IV Push once  glucagon  Injectable 1 milliGRAM(s) IntraMuscular once  insulin lispro (ADMELOG) corrective regimen sliding scale   SubCutaneous every 6 hours  levETIRAcetam  IVPB 500 milliGRAM(s) IV Intermittent every 12 hours    MEDICATIONS  (PRN):  dextrose Oral Gel 15 Gram(s) Oral once PRN Blood Glucose LESS THAN 70 milliGRAM(s)/deciliter      Allergies    No Known Allergies    Intolerances        Vital Signs Last 24 Hrs  T(C): 37.9 (19 Sep 2023 06:04), Max: 38.2 (19 Sep 2023 01:03)  T(F): 100.2 (19 Sep 2023 06:04), Max: 100.8 (19 Sep 2023 01:03)  HR: 105 (19 Sep 2023 05:42) (88 - 119)  BP: 153/87 (19 Sep 2023 05:00) (115/78 - 169/89)  BP(mean): 114 (19 Sep 2023 05:00) (91 - 126)  RR: 17 (19 Sep 2023 05:42) (13 - 27)  SpO2: 100% (19 Sep 2023 05:42) (98% - 100%)    Parameters below as of 19 Sep 2023 05:42  Patient On (Oxygen Delivery Method): ventilator    O2 Concentration (%): 35    INCOMPLETE    Physical exam:  General: No acute distress, awake and alert  Eyes: Anicteric sclerae, moist conjunctivae, see below for CNs  Neck: trachea midline, FROM, supple, no thyromegaly or lymphadenopathy  Cardiovascular: Regular rate and rhythm, no murmurs, rubs, or gallops. No carotid bruits.   Pulmonary: Anterior breath sounds clear bilaterally, no crackles or wheezing. No use of accessory muscles  GI: Abdomen soft, non-distended, non-tender  Extremities: Radial and DP pulses +2, no edema    Neurologic:  -Mental status: Awake, alert, oriented to person, place, and time. Speech is fluent with intact naming, repetition, and comprehension, no dysarthria. Recent and remote memory intact. Follows commands. Attention/concentration intact. Fund of knowledge appropriate.  -Cranial nerves:   II: Visual fields are full to confrontation.  III, IV, VI: Extraocular movements are intact without nystagmus. Pupils equally round and reactive to light  V:  Facial sensation V1-V3 equal and intact   VII: Face is symmetric with normal eye closure and smile  VIII: Hearing is bilaterally intact to finger rub  IX, X: Uvula is midline and soft palate rises symmetrically  XI: Head turning and shoulder shrug are intact.  XII: Tongue protrudes midline  Motor: Normal bulk and tone. No pronator drift. Strength bilateral upper extremity 5/5, bilateral lower extremities 5/5.  Rapid alternating movements intact and symmetric  Sensation: Intact to light touch bilaterally. No neglect or extinction on double simultaneous testing.  Coordination: No dysmetria on finger-to-nose and heel-to-shin bilaterally  Reflexes: Downgoing toes bilaterally   Gait: Narrow gait and steady    LABS:                        13.8   12.28 )-----------( 185      ( 19 Sep 2023 04:45 )             42.8     09-19    150<H>  |  121<H>  |  24<H>  ----------------------------<  182<H>  3.8   |  23  |  0.65    Ca    9.1      19 Sep 2023 04:45  Phos  2.2     09-19  Mg     2.5     09-19    TPro  7.0  /  Alb  3.5  /  TBili  0.8  /  DBili  x   /  AST  41<H>  /  ALT  8<L>  /  AlkPhos  58  09-19      Urinalysis Basic - ( 19 Sep 2023 04:45 )    Color: x / Appearance: x / SG: x / pH: x  Gluc: 182 mg/dL / Ketone: x  / Bili: x / Urobili: x   Blood: x / Protein: x / Nitrite: x   Leuk Esterase: x / RBC: x / WBC x   Sq Epi: x / Non Sq Epi: x / Bacteria: x        RADIOLOGY & ADDITIONAL TESTS:     Neurology Stroke Progress Note    INTERVAL HPI/OVERNIGHT EVENTS:  Patient seen and examined at bedside. Rectal temp of 100.2 this AM. Actively in Afib since arrival; today HR tachycardic to 102-144. Suspected aspiration pneumonia, culture grew E.coli ESBL,only sensitive to Ertapenem, Meropenem, or Amikacin     MEDICATIONS  (STANDING):  atorvastatin 80 milliGRAM(s) Oral at bedtime  cefTRIAXone   IVPB 1000 milliGRAM(s) IV Intermittent every 24 hours  chlorhexidine 0.12% Liquid 15 milliLiter(s) Oral Mucosa every 12 hours  chlorhexidine 2% Cloths 1 Application(s) Topical <User Schedule>  dextrose 5%. 1000 milliLiter(s) (50 mL/Hr) IV Continuous <Continuous>  dextrose 5%. 1000 milliLiter(s) (100 mL/Hr) IV Continuous <Continuous>  dextrose 50% Injectable 25 Gram(s) IV Push once  dextrose 50% Injectable 25 Gram(s) IV Push once  dextrose 50% Injectable 12.5 Gram(s) IV Push once  glucagon  Injectable 1 milliGRAM(s) IntraMuscular once  insulin lispro (ADMELOG) corrective regimen sliding scale   SubCutaneous every 6 hours  levETIRAcetam  IVPB 500 milliGRAM(s) IV Intermittent every 12 hours    MEDICATIONS  (PRN):  dextrose Oral Gel 15 Gram(s) Oral once PRN Blood Glucose LESS THAN 70 milliGRAM(s)/deciliter    Allergies  No Known Allergies  Intolerances      Vital Signs Last 24 Hrs  T(C): 37.9 (19 Sep 2023 06:04), Max: 38.2 (19 Sep 2023 01:03)  T(F): 100.2 (19 Sep 2023 06:04), Max: 100.8 (19 Sep 2023 01:03)  HR: 105 (19 Sep 2023 05:42) (88 - 119)  BP: 153/87 (19 Sep 2023 05:00) (115/78 - 169/89)  BP(mean): 114 (19 Sep 2023 05:00) (91 - 126)  RR: 17 (19 Sep 2023 05:42) (13 - 27)  SpO2: 100% (19 Sep 2023 05:42) (98% - 100%)    Parameters below as of 19 Sep 2023 05:42  Patient On (Oxygen Delivery Method): ventilator    O2 Concentration (%): 35      Physical exam: Intubated   General: Eye closed, will not open to noxious, intubated; turned to the right  Eyes: Anicteric sclerae  Neck: trachea midline; ET tube in place  Cardiovascular: Afib with RVR on monitor   Pulmonary: No use of accessory muscles  Extremities: no edema    Neurologic:  -Mental status: AAOx0; intubated; does not open eyes to noxious; does not follow commands in Maltese (by family)  -Cranial nerves:   II: BTT not intact in R visual field of R eye  III, IV, VI: Pupils equally round and reactive to light; unable to test EOM  V:  Unable to test facial sensation; Corneal reflex intact  VII:  Right facial droop   VIII: Unable to test hearing  Motor: LUE: 3/5 spontaneously and withdraws to noxious; LLE: 1/5 to noxious; RUE: 1/5 with finger movement; RLE: 1/5 to noxious     Sensation: localizes noxious stimulus with sternal rub with LUE; withdraws to pain in LUE/LLE but not RUE/RLE.   Coordination: Unable to test      LABS:                        13.8   12.28 )-----------( 185      ( 19 Sep 2023 04:45 )             42.8     09-19    150<H>  |  121<H>  |  24<H>  ----------------------------<  182<H>  3.8   |  23  |  0.65    Ca    9.1      19 Sep 2023 04:45  Phos  2.2     09-19  Mg     2.5     09-19    TPro  7.0  /  Alb  3.5  /  TBili  0.8  /  DBili  x   /  AST  41<H>  /  ALT  8<L>  /  AlkPhos  58  09-19      Urinalysis Basic - ( 19 Sep 2023 04:45 )    Color: x / Appearance: x / SG: x / pH: x  Gluc: 182 mg/dL / Ketone: x  / Bili: x / Urobili: x   Blood: x / Protein: x / Nitrite: x   Leuk Esterase: x / RBC: x / WBC x   Sq Epi: x / Non Sq Epi: x / Bacteria: x        RADIOLOGY & ADDITIONAL TESTS:    CT Head No Cont (09.19.23 @ 13:40) >  Impression:  Since prior scan, no significant change in large left MCA and PCA territorial   infarctions withstable mass effect or midline shift. No hemorrhagic   transformation.    CT Head No Cont (09.18.23 @ 16:16) >  IMPRESSION: Since prior CT head 9/16/2023, continued evolution of left   MCA and PCA territorial infarctions with increased mass effect and   midline shift measuring 8 mm previously 5 mm. No evidence of hemorrhagic   transformation.    Acute infarct in the left thalamus.

## 2023-09-19 NOTE — CONSULT NOTE ADULT - PROBLEM SELECTOR RECOMMENDATION 3
GOC ongoing. Family still coming to terms with information.  - Full code  - Surrogate: patient's  is surrogate. Will need to get his information for further discussions with family. Today he was not ready to speak with providers.    In addition to the E/M visit, an advance care planning meeting was performed. Start time: 2:00pm; End time: 2:16pm; Total time: 16min. A face to face meeting to discuss advance care planning was held today regarding: AMANDA JESSICA. Primary decision maker: Patient is unable to participate in decision making; Alternate/surrogate: He Anaya. Discussed advance directives including, but not limited to, healthcare proxy and code status. Decision regarding code status: FULL CODE; Documentation completed today: [facility] note

## 2023-09-20 NOTE — PROGRESS NOTE ADULT - SUBJECTIVE AND OBJECTIVE BOX
Neurology Stroke Progress Note    INTERVAL HPI/OVERNIGHT EVENTS:  Patient seen and examined at bedside with family. Pt E.coli ESBL +, 100.9 fever today ertapenem started yesterday x 5 days.       MEDICATIONS  (STANDING):  atorvastatin 80 milliGRAM(s) Oral at bedtime  chlorhexidine 0.12% Liquid 15 milliLiter(s) Oral Mucosa every 12 hours  chlorhexidine 2% Cloths 1 Application(s) Topical <User Schedule>  dextrose 5%. 1000 milliLiter(s) (100 mL/Hr) IV Continuous <Continuous>  dextrose 5%. 1000 milliLiter(s) (50 mL/Hr) IV Continuous <Continuous>  dextrose 50% Injectable 25 Gram(s) IV Push once  dextrose 50% Injectable 25 Gram(s) IV Push once  dextrose 50% Injectable 12.5 Gram(s) IV Push once  ertapenem  IVPB 1000 milliGRAM(s) IV Intermittent every 24 hours  glucagon  Injectable 1 milliGRAM(s) IntraMuscular once  insulin lispro (ADMELOG) corrective regimen sliding scale   SubCutaneous every 6 hours  levETIRAcetam  IVPB 250 milliGRAM(s) IV Intermittent every 12 hours    MEDICATIONS  (PRN):  dextrose Oral Gel 15 Gram(s) Oral once PRN Blood Glucose LESS THAN 70 milliGRAM(s)/deciliter      Allergies  No Known Allergies  Intolerances        Vital Signs Last 24 Hrs  T(C): 38.3 (20 Sep 2023 13:00), Max: 38.3 (20 Sep 2023 13:00)  T(F): 100.9 (20 Sep 2023 13:00), Max: 100.9 (20 Sep 2023 13:00)  HR: 120 (20 Sep 2023 13:00) (88 - 126)  BP: 121/69 (20 Sep 2023 13:00) (111/77 - 173/103)  BP(mean): 92 (20 Sep 2023 13:00) (89 - 130)  RR: 18 (20 Sep 2023 13:00) (12 - 19)  SpO2: 97% (20 Sep 2023 13:00) (97% - 100%)    Parameters below as of 20 Sep 2023 13:00  Patient On (Oxygen Delivery Method): ventilator    O2 Concentration (%): 35    Physical exam: Intubated   General: Eye closed, will not open to noxious, intubated; turned to the right  Eyes: Anicteric sclerae  Neck: trachea midline; ET tube in place  Cardiovascular: Afib with RVR on monitor   Pulmonary: No use of accessory muscles  Extremities: no edema    Neurologic:  -Mental status: AAOx0; intubated; does not open eyes to noxious; does not follow commands in Italian (by family)  -Cranial nerves:   II: BTT not intact in R visual field of R eye  III, IV, VI: Pupils equally round and reactive to light; unable to test EOM  V:  Unable to test facial sensation; Corneal reflex intact  VII:  Right facial droop   VIII: Unable to test hearing  Motor: Withdraws to noxious on LUE/LLE; RUE: 1/5 with finger movement; RLE triple flexion     Sensation: localizes noxious stimulus with sternal rub with LUE; withdraws to pain in LUE/LLE but not RUE/RLE.   Coordination: Unable to test      LABS:                        13.0   9.99  )-----------( 193      ( 20 Sep 2023 05:56 )             41.4     09-20    154<H>  |  121<H>  |  25<H>  ----------------------------<  268<H>  4.2   |  25  |  0.67    Ca    9.5      20 Sep 2023 12:18  Phos  2.7     09-20  Mg     2.7     09-20    TPro  6.5  /  Alb  3.5  /  TBili  0.7  /  DBili  x   /  AST  32  /  ALT  10  /  AlkPhos  77  09-20      Urinalysis Basic - ( 20 Sep 2023 12:18 )    Color: x / Appearance: x / SG: x / pH: x  Gluc: 268 mg/dL / Ketone: x  / Bili: x / Urobili: x   Blood: x / Protein: x / Nitrite: x   Leuk Esterase: x / RBC: x / WBC x   Sq Epi: x / Non Sq Epi: x / Bacteria: x        RADIOLOGY & ADDITIONAL TESTS:    CT Head No Cont (09.19.23 @ 13:40) >  Impression:  Since prior scan, no significant change in large left MCA and PCA territorial   infarctions withstable mass effect or midline shift. No hemorrhagic   transformation.    CT Head No Cont (09.18.23 @ 16:16) >  IMPRESSION: Since prior CT head 9/16/2023, continued evolution of left   MCA and PCA territorial infarctions with increased mass effect and   midline shift measuring 8 mm previously 5 mm. No evidence of hemorrhagic   transformation.    Acute infarct in the left thalamus.

## 2023-09-20 NOTE — PROGRESS NOTE ADULT - SUBJECTIVE AND OBJECTIVE BOX
HOSPITAL COURSE:  87yo female with well controlled HTN, HLD and T2DM presents to ED as a tx from [facility] i/s/o suspected MCA stroke. Pt had stroke-like symptoms a couple of weeks prior with no significant deficits noted at that time and patient was DCed home on aspirin. Today, patient not answering phone and found down by neighbor in wellness check in pool of urine.  BIBEMS to [facility] where she was intubated for airway protection and then subsequently transferred to [facility] for possible neurosurgical intervention. Per family at bedside, patient a normally active and functional and ambulating multiple city blocks at a time. Prior to stroke 2-3 weeks before, patient otherwise fully functional, living alone with well controlled comorbidities. CT of the head - infarct in MCA distribution, developing left PCA distribution infarct. Patient given hypertonic saline with NA goal 145-155, Midodrine 50g, EtCO2 30-35, head of the bed elevated to 45, to prevent interval intracranial edema. SBP goal <180.     INTERVAL HPI/OVERNIGHT EVENTS:   No overnight events. AM Na 150, d/c'd 3%. Mild fever 100.6 likely 2/2 to aspiration pneumonia    Subjective: Patient seen and examined at bedside. Stroke rec repeat CT head tday. Discussion ongoing w/ family currently full code, but leaning towards comfort.  Afib 120's will hold off unless pt becomes uncontrolled again. Patient responsive to painful stimuli in 4/4 extremities. Pupils reactive to light, moves spontaneously occasionally      ICU Vital Signs Last 24 Hrs  ICU Vital Signs Last 24 Hrs  T(C): 37.6 (20 Sep 2023 01:03), Max: 38 (19 Sep 2023 09:05)  T(F): 99.7 (20 Sep 2023 01:03), Max: 100.4 (19 Sep 2023 09:05)  HR: 94 (20 Sep 2023 06:00) (88 - 126)  BP: 158/89 (20 Sep 2023 06:00) (111/74 - 168/89)  BP(mean): 117 (20 Sep 2023 06:00) (84 - 124)  ABP: --  ABP(mean): --  RR: 16 (20 Sep 2023 06:00) (12 - 21)  SpO2: 100% (20 Sep 2023 06:00) (100% - 100%)    O2 Parameters below as of 20 Sep 2023 07:00  Patient On (Oxygen Delivery Method): ventilator    O2 Concentration (%): 35    PHYSICAL EXAM:  General: thin; frail  HEENT: NC/AT; PERRL  Neck: intubated   Cardiovascular: Irregular rate w/ normal rhythym, +S1/S2; NO M/R/G  Respiratory: CTA B/L; no W/R/R  Gastrointestinal: soft, NT/ND; +BSx4  Extremities: WWP; no edema or cyanosis  Vascular: 2+ radial, DP/PT pulses B/L  Neurological: AAOx0; intubated. Pt occasionally opens eyes and tracks. Withdraws to painful stimuli in 4/4 extremities     I&O's Summary    18 Sep 2023 07:01  -  19 Sep 2023 07:00  --------------------------------------------------------  IN: 965 mL / OUT: 1745 mL / NET: -780 mL    19 Sep 2023 07:01  -  19 Sep 2023 14:33  --------------------------------------------------------  IN: 100 mL / OUT: 268 mL / NET: -168 mL      Mode: AC/ CMV (Assist Control/ Continuous Mandatory Ventilation)  RR (machine): 12  TV (machine): 300  FiO2: 35  PEEP: 5  ITime: 1  MAP: 7  PIP: 14      LABS:                                   13.0   9.99  )-----------( 193      ( 20 Sep 2023 05:56 )             41.4     09-20    152<H>  |  120<H>  |  28<H>  ----------------------------<  250<H>  3.7   |  25  |  0.74    Ca    8.8      20 Sep 2023 02:51  Phos  2.7     09-20  Mg     2.5     09-19    TPro  7.0  /  Alb  3.5  /  TBili  0.8  /  DBili  x   /  AST  41<H>  /  ALT  8<L>  /  AlkPhos  58  09-19      Urinalysis Basic - ( 20 Sep 2023 02:51 )    Color: x / Appearance: x / SG: x / pH: x  Gluc: 250 mg/dL / Ketone: x  / Bili: x / Urobili: x   Blood: x / Protein: x / Nitrite: x   Leuk Esterase: x / RBC: x / WBC x   Sq Epi: x / Non Sq Epi: x / Bacteria: x                RADIOLOGY, EKG & ADDITIONAL TESTS: Reviewed.       CAPILLARY BLOOD GLUCOSE      POCT Blood Glucose.: 158 mg/dL (19 Sep 2023 11:23)  POCT Blood Glucose.: 146 mg/dL (18 Sep 2023 16:49)      Consultant(s) Notes Reviewed:  [x ] YES  [ ] NO    MEDICATIONS  (STANDING):  atorvastatin 80 milliGRAM(s) Oral at bedtime  chlorhexidine 0.12% Liquid 15 milliLiter(s) Oral Mucosa every 12 hours  chlorhexidine 2% Cloths 1 Application(s) Topical <User Schedule>  dextrose 5%. 1000 milliLiter(s) (50 mL/Hr) IV Continuous <Continuous>  dextrose 5%. 1000 milliLiter(s) (100 mL/Hr) IV Continuous <Continuous>  dextrose 50% Injectable 25 Gram(s) IV Push once  dextrose 50% Injectable 25 Gram(s) IV Push once  dextrose 50% Injectable 12.5 Gram(s) IV Push once  glucagon  Injectable 1 milliGRAM(s) IntraMuscular once  insulin lispro (ADMELOG) corrective regimen sliding scale   SubCutaneous every 6 hours  levETIRAcetam  IVPB 500 milliGRAM(s) IV Intermittent every 12 hours    MEDICATIONS  (PRN):  dextrose Oral Gel 15 Gram(s) Oral once PRN Blood Glucose LESS THAN 70 milliGRAM(s)/deciliter      Care Discussed with Consultants/Other Providers [ x] YES  [ ] NO    RADIOLOGY & ADDITIONAL TESTS: HOSPITAL COURSE:  87yo female with well controlled HTN, HLD and T2DM presents to ED as a tx from [facility] i/s/o suspected MCA stroke. Pt had stroke-like symptoms a couple of weeks prior with no significant deficits noted at that time and patient was DCed home on aspirin. Today, patient not answering phone and found down by neighbor in wellness check in pool of urine.  BIBEMS to [facility] where she was intubated for airway protection and then subsequently transferred to [facility] for possible neurosurgical intervention. Per family at bedside, patient a normally active and functional and ambulating multiple city blocks at a time. Prior to stroke 2-3 weeks before, patient otherwise fully functional, living alone with well controlled comorbidities. CT of the head - infarct in MCA distribution, developing left PCA distribution infarct. Patient given hypertonic saline with NA goal 145-155, Midodrine 50g, EtCO2 30-35, head of the bed elevated to 45, to prevent interval intracranial edema. SBP goal <180.     INTERVAL HPI/OVERNIGHT EVENTS:   Was started on d5 at 6 pm. stopped at 345 am for Na of 152       Subjective: Patient seen and examined at bedside. Stroke rec repeat CT head tday. Discussion ongoing w/ family currently full code, but leaning towards comfort.  Afib 120's will hold off unless pt becomes uncontrolled again. Patient responsive to painful stimuli in 4/4 extremities. Pupils reactive to light, moves spontaneously occasionally      ICU Vital Signs Last 24 Hrs  ICU Vital Signs Last 24 Hrs  T(C): 37.6 (20 Sep 2023 01:03), Max: 38 (19 Sep 2023 09:05)  T(F): 99.7 (20 Sep 2023 01:03), Max: 100.4 (19 Sep 2023 09:05)  HR: 94 (20 Sep 2023 06:00) (88 - 126)  BP: 158/89 (20 Sep 2023 06:00) (111/74 - 168/89)  BP(mean): 117 (20 Sep 2023 06:00) (84 - 124)  ABP: --  ABP(mean): --  RR: 16 (20 Sep 2023 06:00) (12 - 21)  SpO2: 100% (20 Sep 2023 06:00) (100% - 100%)    O2 Parameters below as of 20 Sep 2023 07:00  Patient On (Oxygen Delivery Method): ventilator    O2 Concentration (%): 35    PHYSICAL EXAM:  General: thin; frail  HEENT: NC/AT; PERRL  Neck: intubated   Cardiovascular: Irregular rate w/ normal rhythym, +S1/S2; NO M/R/G  Respiratory: CTA B/L; no W/R/R  Gastrointestinal: soft, NT/ND; +BSx4  Extremities: WWP; no edema or cyanosis  Vascular: 2+ radial, DP/PT pulses B/L  Neurological: AAOx0; intubated. Pt occasionally opens eyes and tracks. Withdraws to painful stimuli in 4/4 extremities     I&O's Summary    18 Sep 2023 07:01  -  19 Sep 2023 07:00  --------------------------------------------------------  IN: 965 mL / OUT: 1745 mL / NET: -780 mL    19 Sep 2023 07:01  -  19 Sep 2023 14:33  --------------------------------------------------------  IN: 100 mL / OUT: 268 mL / NET: -168 mL      Mode: AC/ CMV (Assist Control/ Continuous Mandatory Ventilation)  RR (machine): 12  TV (machine): 300  FiO2: 35  PEEP: 5  ITime: 1  MAP: 7  PIP: 14      LABS:                                   13.0   9.99  )-----------( 193      ( 20 Sep 2023 05:56 )             41.4     09-20    152<H>  |  120<H>  |  28<H>  ----------------------------<  250<H>  3.7   |  25  |  0.74    Ca    8.8      20 Sep 2023 02:51  Phos  2.7     09-20  Mg     2.5     09-19    TPro  7.0  /  Alb  3.5  /  TBili  0.8  /  DBili  x   /  AST  41<H>  /  ALT  8<L>  /  AlkPhos  58  09-19      Urinalysis Basic - ( 20 Sep 2023 02:51 )    Color: x / Appearance: x / SG: x / pH: x  Gluc: 250 mg/dL / Ketone: x  / Bili: x / Urobili: x   Blood: x / Protein: x / Nitrite: x   Leuk Esterase: x / RBC: x / WBC x   Sq Epi: x / Non Sq Epi: x / Bacteria: x                RADIOLOGY, EKG & ADDITIONAL TESTS: Reviewed.       CAPILLARY BLOOD GLUCOSE      POCT Blood Glucose.: 158 mg/dL (19 Sep 2023 11:23)  POCT Blood Glucose.: 146 mg/dL (18 Sep 2023 16:49)      Consultant(s) Notes Reviewed:  [x ] YES  [ ] NO    MEDICATIONS  (STANDING):  atorvastatin 80 milliGRAM(s) Oral at bedtime  chlorhexidine 0.12% Liquid 15 milliLiter(s) Oral Mucosa every 12 hours  chlorhexidine 2% Cloths 1 Application(s) Topical <User Schedule>  dextrose 5%. 1000 milliLiter(s) (50 mL/Hr) IV Continuous <Continuous>  dextrose 5%. 1000 milliLiter(s) (100 mL/Hr) IV Continuous <Continuous>  dextrose 50% Injectable 25 Gram(s) IV Push once  dextrose 50% Injectable 25 Gram(s) IV Push once  dextrose 50% Injectable 12.5 Gram(s) IV Push once  glucagon  Injectable 1 milliGRAM(s) IntraMuscular once  insulin lispro (ADMELOG) corrective regimen sliding scale   SubCutaneous every 6 hours  levETIRAcetam  IVPB 500 milliGRAM(s) IV Intermittent every 12 hours    MEDICATIONS  (PRN):  dextrose Oral Gel 15 Gram(s) Oral once PRN Blood Glucose LESS THAN 70 milliGRAM(s)/deciliter      Care Discussed with Consultants/Other Providers [ x] YES  [ ] NO    RADIOLOGY & ADDITIONAL TESTS: HOSPITAL COURSE:  89yo female with well controlled HTN, HLD and T2DM presents to ED as a tx from [facility] i/s/o suspected MCA stroke. Pt had stroke-like symptoms a couple of weeks prior with no significant deficits noted at that time and patient was DCed home on aspirin. Today, patient not answering phone and found down by neighbor in wellness check in pool of urine.  BIBEMS to [facility] where she was intubated for airway protection and then subsequently transferred to [facility] for possible neurosurgical intervention. Per family at bedside, patient a normally active and functional and ambulating multiple city blocks at a time. Prior to stroke 2-3 weeks before, patient otherwise fully functional, living alone with well controlled comorbidities. CT of the head - infarct in MCA distribution, developing left PCA distribution infarct. Patient given hypertonic saline with NA goal 145-155, Midodrine 50g, EtCO2 30-35, head of the bed elevated to 45, to prevent interval intracranial edema. SBP goal <180.     INTERVAL HPI/OVERNIGHT EVENTS: Was started on d5 at 6 pm. Stopped at 345 am for Na of 152. Repeat Na of 155.     Subjective: Patient seen and examined at bedside. AAOx0.     ICU Vital Signs Last 24 Hrs  ICU Vital Signs Last 24 Hrs  T(C): 37.6 (20 Sep 2023 01:03), Max: 38 (19 Sep 2023 09:05)  T(F): 99.7 (20 Sep 2023 01:03), Max: 100.4 (19 Sep 2023 09:05)  HR: 94 (20 Sep 2023 06:00) (88 - 126)  BP: 158/89 (20 Sep 2023 06:00) (111/74 - 168/89)  BP(mean): 117 (20 Sep 2023 06:00) (84 - 124)  ABP: --  ABP(mean): --  RR: 16 (20 Sep 2023 06:00) (12 - 21)  SpO2: 100% (20 Sep 2023 06:00) (100% - 100%)    O2 Parameters below as of 20 Sep 2023 07:00  Patient On (Oxygen Delivery Method): ventilator    O2 Concentration (%): 35    PHYSICAL EXAM:  General: thin; frail  HEENT: NC/AT; PERRL  Neck: intubated   Cardiovascular: Irregular rate w/ normal rhythym, +S1/S2; NO M/R/G  Respiratory: CTA B/L; no W/R/R  Gastrointestinal: soft, NT/ND; +BSx4  Extremities: WWP; no edema or cyanosis  Vascular: 2+ radial, DP/PT pulses B/L  Neurological: AAOx0; intubated. Pt occasionally opens eyes and tracks. Withdraws to painful stimuli in 4/4 extremities     I&O's Summary    I&O's Detail    19 Sep 2023 07:01  -  20 Sep 2023 07:00  --------------------------------------------------------  IN:    dextrose 5%: 320 mL    IV PiggyBack: 100 mL    IV PiggyBack: 250 mL    Jevity 1.2: 340 mL    Sodium Chloride 0.9% Bolus: 250 mL  Total IN: 1260 mL    OUT:    Indwelling Catheter - Urethral (mL): 1383 mL  Total OUT: 1383 mL    Total NET: -123 mL      20 Sep 2023 07:01  -  20 Sep 2023 17:57  --------------------------------------------------------  IN:    IV PiggyBack: 100 mL    Jevity 1.2: 505 mL  Total IN: 605 mL    OUT:    Indwelling Catheter - Urethral (mL): 555 mL  Total OUT: 555 mL    Total NET: 50 mL      VENT SETTINGS:  Mode: AC/ CMV (Assist Control/ Continuous Mandatory Ventilation)  RR (machine): 12  TV (machine): 300  FiO2: 35  PEEP: 5  ITime: 1  MAP: 7  PIP: 14      LABS:                                   13.0   9.99  )-----------( 193      ( 20 Sep 2023 05:56 )             41.4     09-20    152<H>  |  120<H>  |  28<H>  ----------------------------<  250<H>  3.7   |  25  |  0.74    Ca    8.8      20 Sep 2023 02:51  Phos  2.7     09-20  Mg     2.5     09-19    TPro  7.0  /  Alb  3.5  /  TBili  0.8  /  DBili  x   /  AST  41<H>  /  ALT  8<L>  /  AlkPhos  58  09-19      Urinalysis Basic - ( 20 Sep 2023 02:51 )    Color: x / Appearance: x / SG: x / pH: x  Gluc: 250 mg/dL / Ketone: x  / Bili: x / Urobili: x   Blood: x / Protein: x / Nitrite: x   Leuk Esterase: x / RBC: x / WBC x   Sq Epi: x / Non Sq Epi: x / Bacteria: x    CAPILLARY BLOOD GLUCOSE      POCT Blood Glucose.: 158 mg/dL (19 Sep 2023 11:23)  POCT Blood Glucose.: 146 mg/dL (18 Sep 2023 16:49)      Consultant(s) Notes Reviewed:  [x ] YES  [ ] NO    MEDICATIONS  (STANDING):  atorvastatin 80 milliGRAM(s) Oral at bedtime  chlorhexidine 0.12% Liquid 15 milliLiter(s) Oral Mucosa every 12 hours  chlorhexidine 2% Cloths 1 Application(s) Topical <User Schedule>  dextrose 5%. 1000 milliLiter(s) (50 mL/Hr) IV Continuous <Continuous>  dextrose 5%. 1000 milliLiter(s) (100 mL/Hr) IV Continuous <Continuous>  dextrose 50% Injectable 25 Gram(s) IV Push once  dextrose 50% Injectable 25 Gram(s) IV Push once  dextrose 50% Injectable 12.5 Gram(s) IV Push once  glucagon  Injectable 1 milliGRAM(s) IntraMuscular once  insulin lispro (ADMELOG) corrective regimen sliding scale   SubCutaneous every 6 hours  levETIRAcetam  IVPB 500 milliGRAM(s) IV Intermittent every 12 hours    MEDICATIONS  (PRN):  dextrose Oral Gel 15 Gram(s) Oral once PRN Blood Glucose LESS THAN 70 milliGRAM(s)/deciliter      Care Discussed with Consultants/Other Providers [ x] YES  [ ] NO    RADIOLOGY & ADDITIONAL TESTS:

## 2023-09-20 NOTE — PROGRESS NOTE ADULT - ASSESSMENT
89yo female with PMHx of HTN, HLD, T2DM presents to [facility] as a tx from [facility] i/s/o concern for large L sided MCA stroke. ICU consulted for further management.     Neuro  #MCA Stroke  #MCA Thrombus  Pt intubated @ [facility] i/s/o airway protection for suspected large L MCA stroke. CT brain stroke protocol showing opacified b/l MCAs L>R, cannot exclude thrombus. Per Neurosurgical team, CT brain perfusion showing infarction around MCA territory which is possibly too substantial to allow for adequate brain recovery even if intervention is done on thrombus.  Per Neurosurg, no plan for intervention on thrombus and MCA stroke expected to enlarge with larger infarction to be expected with possible cerebral edema however no edema witnessed on scans at this time.     - will wean sedation to see neurologic status -- patient spontaneously moving b/l LE and LUE however no movement seen in RUE;  - strict BP control w/ SBP <180  - Na goal 145-155 i/s/o stroke -- will check with BID BMPs per neurosurgery; Na of 152 today   - No ASA per neurosurgery  - Palliative consulted i/s/o possible worsening infarction with no intervention -- family support needed   - Non con Head CT performed 09/19 with no significant change in large left MCA and PCA territorial   infarctions with stable mass effect or midline shift. No hemorrhagic   transformation.      Cards  #Afib  New onset Afib with HR 120s. Patient bradycardiac over last two days  - Hold off on medications but can give diltiazem 5mg if pt becomes uncontrolled    #Hx of HTN  #BP Control  Will exhibit strict BP control i/s/o stroke. BPs in ED labile ranging from -190s.     Respiratory  #AHRF i/s/o MCA Stroke w/ Asp Pna   CT Chest showing signs of possible aspiration pna i/s/o intubation. WBC 12 however likely reactive i/s/o stroke. Afebrile.   - will obtain sputum cx  - aspiration precautions   - started Ertapenam 1000mg IVP on 09/19- requires ID approval for continued dosing     Renal  #Permissive Hypernatremia i/s/o MCA Infarct   Na 136 on admission. Per protocol, NaCl 3% 100cc bolus.    Na -- goal 145-155    #Indwelling mathew  - monitor urine output -- placed in MICU    Heme  #Elevated Hgb  HGB 15 on admission likely i/s/o hemoconcentration.   - f/u repeat on AM labs    Endo  #Hx of T2DM  Hx of T2DM. A1c and home medications unknown.  - f/u A1c in AM  - started Kaiden     ID  See pulm    Prophylaxis:  - DVT: SCDs   - GI: none      87yo female with PMHx of HTN, HLD, T2DM presents to [facility] as a tx from [facility] i/s/o concern for large L sided MCA stroke. ICU consulted for further management.     Neuro  #MCA Stroke  #MCA Thrombus  Pt intubated @ [facility] i/s/o airway protection for suspected large L MCA stroke. CT brain stroke protocol showing opacified b/l MCAs L>R, cannot exclude thrombus. Per Neurosurgical team, CT brain perfusion showing infarction around MCA territory which is possibly too substantial to allow for adequate brain recovery even if intervention is done on thrombus.  Per Neurosurg, no plan for intervention on thrombus and MCA stroke expected to enlarge with larger infarction to be expected with possible cerebral edema however no edema witnessed on scans at this time.     - Patient weaned off sedation  - strict BP control w/ SBP <180  - Na goal 145-155 i/s/o stroke -- Na of 155 today   - No ASA per neurosurgery  - Palliative consulted i/s/o possible worsening infarction with no intervention -- family support needed   - Non con Head CT performed 09/20 with no significant change in large left MCA and PCA territorial   infarctions with stable mass effect or midline shift. No hemorrhagic   transformation.  - Follow neuro recs: Serum osm 340, mannitol discontinued.   - Follow-up       Cards  #Afib  New onset Afib with HR 120s. Patient bradycardiac over last two days  - Hold off on medications but can give diltiazem 5mg if pt becomes uncontrolled    #Hx of HTN  #BP Control  Will exhibit strict BP control i/s/o stroke. BPs in ED labile ranging from -190s.     Respiratory  #AHRF i/s/o MCA Stroke w/ Asp Pna   CT Chest showing signs of possible aspiration pna i/s/o intubation. WBC 12 however likely reactive i/s/o stroke. Afebrile.   - will obtain sputum cx  - aspiration precautions   - started Ertapenam 1000mg IVP on 09/19- requires ID approval for continued dosing     Renal  #Permissive Hypernatremia i/s/o MCA Infarct   Na 136 on admission. Per protocol, NaCl 3% 100cc bolus.    Na -- goal 145-155    #Indwelling mathew  - monitor urine output -- placed in MICU    Heme  #Elevated Hgb  HGB 15 on admission likely i/s/o hemoconcentration.   - f/u repeat on AM labs    Endo  #Hx of T2DM  Hx of T2DM. A1c and home medications unknown.  - f/u A1c in AM  - started Kaiden     ID  See pulm    Prophylaxis:  - DVT: SCDs   - GI: none      87yo female with PMHx of HTN, HLD, T2DM presents to [facility] as a tx from [facility] i/s/o concern for large L sided MCA stroke. ICU consulted for further management.     Neuro  #MCA Stroke  #MCA Thrombus  Pt intubated @ [facility] i/s/o airway protection for suspected large L MCA stroke. CT brain stroke protocol showing opacified b/l MCAs L>R, cannot exclude thrombus. Per Neurosurgical team, CT brain perfusion showing infarction around MCA territory which is possibly too substantial to allow for adequate brain recovery even if intervention is done on thrombus.  Per Neurosurg, no plan for intervention on thrombus and MCA stroke expected to enlarge with larger infarction to be expected with possible cerebral edema however no edema witnessed on scans at this time.     - Patient weaned off sedation  - strict BP control w/ SBP <180  - Na goal 145-155 i/s/o stroke -- Na of 155 today   - No ASA per neurosurgery  - Palliative consulted i/s/o possible worsening infarction with no intervention -- family support needed   - Non con Head CT performed 09/20 with no significant change in large left MCA and PCA territorial   infarctions with stable mass effect or midline shift. No hemorrhagic   transformation.  - Follow neuro recs: Serum osm 340, mannitol discontinued.   -       Cards  #Afib  New onset Afib with HR 120s. Patient bradycardiac over last two days  - Hold off on medications but can give diltiazem 5mg if pt becomes uncontrolled    #Hx of HTN  #BP Control  Will exhibit strict BP control i/s/o stroke. BPs in ED labile ranging from -190s.     Respiratory  #AHRF i/s/o MCA Stroke w/ Asp Pna   CT Chest showing signs of possible aspiration pna i/s/o intubation. WBC 12 however likely reactive i/s/o stroke. Afebrile.   - will obtain sputum cx  - aspiration precautions   - started Ertapenam 1000mg IVP on 09/19- requires ID approval for continued dosing     Renal  #Permissive Hypernatremia i/s/o MCA Infarct   Na 136 on admission. Per protocol, NaCl 3% 100cc bolus.    Na -- goal 145-155    #Indwelling mathew  - monitor urine output -- placed in MICU    Heme  #Elevated Hgb  HGB 15 on admission likely i/s/o hemoconcentration.   - f/u repeat on AM labs    Endo  #Hx of T2DM  Hx of T2DM. A1c and home medications unknown.  - f/u A1c in AM  - started Kaiden     ID  See pulm    Prophylaxis:  - DVT: SCDs   - GI: none      87yo female with PMHx of HTN, HLD, T2DM presents to [facility] as a tx from [facility] i/s/o concern for large L sided MCA stroke. ICU consulted for further management.     Neuro  #MCA Stroke  #MCA Thrombus  Pt intubated @ [facility] i/s/o airway protection for suspected large L MCA stroke. CT brain stroke protocol showing opacified b/l MCAs L>R, cannot exclude thrombus. Per Neurosurgical team, CT brain perfusion showing infarction around MCA territory which is possibly too substantial to allow for adequate brain recovery even if intervention is done on thrombus.  Per Neurosurg, no plan for intervention on thrombus and MCA stroke expected to enlarge with larger infarction to be expected with possible cerebral edema however no edema witnessed on scans at this time.     - Patient weaned off sedation  - strict BP control w/ SBP <180  - Na goal 145-155 i/s/o stroke -- Na of 155 today   - No ASA per neurosurgery  - Palliative consulted i/s/o possible worsening infarction with no intervention -- family support needed   - Non con Head CT performed 09/20. F/u read  - Follow neuro recs: Serum osm 340, mannitol discontinued.          Cards  #Afib  New onset Afib with HR 120s. Patient bradycardiac over last two days  - Hold off on medications but can give diltiazem 5mg if pt becomes uncontrolled    #Hx of HTN  #BP Control  Will exhibit strict BP control i/s/o stroke. BPs in ED labile ranging from -190s.     Respiratory  #AHRF i/s/o MCA Stroke w/ Asp Pna   CT Chest showing signs of possible aspiration pna i/s/o intubation. WBC 12 however likely reactive i/s/o stroke. Afebrile.   - will obtain sputum cx  - aspiration precautions   - Continue with Ertapenam 1000mg IVP for 4 days (Day 2)    Renal  #Permissive Hypernatremia i/s/o MCA Infarct   Na 136 on admission. Per protocol, NaCl 3% 100cc bolus.    Na -- goal 145-155    #Indwelling mathew  - monitor urine output -- placed in MICU    Heme  #Elevated Hgb  HGB 15 on admission likely i/s/o hemoconcentration.   - f/u repeat on AM labs    Endo  #Hx of T2DM  Hx of T2DM. A1c and home medications unknown.  - f/u A1c in AM  - started Kaiden     ID  See pulm    Prophylaxis:  - DVT: SCDs   - GI: none      87yo female with PMHx of HTN, HLD, T2DM presents to [facility] as a tx from [facility] i/s/o concern for large L sided MCA stroke. ICU consulted for further management.     Neuro  #MCA Stroke  #MCA Thrombus  Pt intubated @ [facility] i/s/o airway protection for suspected large L MCA stroke. CT brain stroke protocol showing opacified b/l MCAs L>R, cannot exclude thrombus. Per Neurosurgical team, CT brain perfusion showing infarction around MCA territory which is possibly too substantial to allow for adequate brain recovery even if intervention is done on thrombus.  Per Neurosurg, no plan for intervention on thrombus and MCA stroke expected to enlarge with larger infarction to be expected with possible cerebral edema however no edema witnessed on scans at this time.     - Patient weaned off sedation  - strict BP control w/ SBP <180  - Na goal 145-155 i/s/o stroke -- Na of 155 today   - No ASA per neurosurgery  - Palliative consulted i/s/o possible worsening infarction with no intervention -- family support needed   - Non con Head CT performed 09/20. F/u read  - Follow neuro recs: Serum osm 340, mannitol discontinued.          Cards  #Afib  New onset Afib with HR 120s. Patient bradycardiac over last two days  - Hold off on medications but can give diltiazem 5mg if pt becomes uncontrolled    #Hx of HTN  #BP Control  Will exhibit strict BP control i/s/o stroke. BPs in ED labile ranging from -190s.     Respiratory  #Acute Hypoxic Respiratory Failure i/s/o MCA Stroke w/ Asp Pna   CT Chest showing signs of possible aspiration pna i/s/o intubation. WBC 12 however likely reactive i/s/o stroke. Afebrile.   - will obtain sputum cx  - aspiration precautions   - Continue with Ertapenam 1000mg IVP for 4 days (Day 2)    Renal  #Permissive Hypernatremia i/s/o MCA Infarct   Na 136 on admission. Per protocol, NaCl 3% 100cc bolus.    Na -- goal 145-155    #Indwelling mathew  - monitor urine output -- placed in MICU    Heme  #Elevated Hgb  HGB 15 on admission likely i/s/o hemoconcentration.   - f/u repeat on AM labs    Endo  #Hx of T2DM  Hx of T2DM. A1c and home medications unknown.  - f/u A1c in AM  - started Kaiden     ID  See pulm    Prophylaxis:  - DVT: SCDs   - GI: none

## 2023-09-20 NOTE — PROGRESS NOTE ADULT - ASSESSMENT
89yo Female with PMHX of HTN, HLD, T2DM, and TIA (2.5 weeks ago; pt with L sided weakness, was admitted to [place] and discharged on ASA without residual deficits) BIBEMS to [facility] ED as transfer from [facility] for L MCA stroke as possible thrombectomy candidate. Pt intubated at [facility] and found to hyperdense L MCA on noncon CTH. NIHSS 32 on arrival. CTH with evidence of acute L MCA infarct, CTP with mismatch volume of 238cc with mismatch ratio 6.8 within L MCA territory, CTA H/N with left MCA bifurcation occlusion, free air in right carotid deep spaces likely due to traumatic intubation. Pt out of window for thrombolytic treatment. After discussion between Dr. Ontiveros and Dr. Plascencia, patient was deemed not a candidate for mechanical thrombectomy given ischemic changes along L MCA territory on CTH with large correlating core on CTP. Throughout admission Afib noted on the monitor. New-onset afib in RVR with heart rate >100, today up to 120-144. CTH on 9/18 with increase in mass shift 5 to 8mm, 3% started as well as 50mg mannitol 20%. Today, Na: 155 this Am and Na:154 with afternoon BMP. Pt BMP and Osm repeated Q6 with mannitol only given is Osm <320. Repeat CTH today. Pt E.coli ESBL +, 100.9 fever today ertapenem started yesterday x 5 days. Palliative care consulted, GOC discussion being had, at this time, family needs more time before any making any decisions.     Stroke etiology likely embolic source (cardioembolic vs hypercoagulable state), fetal PCA noted on CTA possibly explaining multiple territory CVA    Recommendations   Cytotoxic swelling management ( peak swelling 3-5 days after CVA)  - Q6 BMP and osm checks  - Give 1g/kg mannitol if osm <320  - Restart 3% saline if needed to keep Na goal 145-155 to prevent interval intracranial edema   - Please repeat CTH today   - NRSGY following for enmanuel-crani watch, f/u recs  - If any questions about hyperosmotic/hypertonic therapy, please reach out to neuro ICU on call    CVA ( L MCA and L PCA infarct)  - Can start ASA 81mg  - continue Atorvastatin 80mg PO daily  - recommend SBP goal <180  - recommend q1hr coma checks and vitals   - provide stroke education  - obtain collateral from [facility]     Seizure-like activity   - Decrease Keppra 250mg BID, please place EEG    Palliative care following, GOC discussion started with family, at this time, family needs more time before any making any decisions. Pt full code     Discussed with Neurology Attending Dr. Mustafa

## 2023-09-21 PROBLEM — Z00.00 ENCOUNTER FOR PREVENTIVE HEALTH EXAMINATION: Status: ACTIVE | Noted: 2023-01-01

## 2023-09-21 NOTE — PROGRESS NOTE ADULT - ASSESSMENT
89yo female with PMHx of HTN, HLD, T2DM presents to [facility] as a tx from [facility] i/s/o concern for large L sided MCA stroke. ICU consulted for further management.     Neuro  #MCA Stroke  #MCA Thrombus  Pt intubated @ [facility] i/s/o airway protection for suspected large L MCA stroke. CT brain stroke protocol showing opacified b/l MCAs L>R, cannot exclude thrombus. Per Neurosurgical team, CT brain perfusion showing infarction around MCA territory which is possibly too substantial to allow for adequate brain recovery even if intervention is done on thrombus.  Per Neurosurg, no plan for intervention on thrombus and MCA stroke expected to enlarge with larger infarction to be expected with possible cerebral edema however no edema witnessed on scans at this time.     - Patient weaned off sedation  - strict BP control w/ SBP <180  - Na goal 145-155 i/s/o stroke -- Na of 155 today   - No ASA per neurosurgery  - Palliative consulted i/s/o possible worsening infarction with no intervention -- family support needed   - Non con Head CT performed 09/20. F/u read  - Follow neuro recs: Serum osm 340, mannitol discontinued.          Cards  #Afib  New onset Afib with HR 120s. Patient bradycardiac over last two days  - Hold off on medications but can give diltiazem 5mg if pt becomes uncontrolled    #Hx of HTN  #BP Control  Will exhibit strict BP control i/s/o stroke. BPs in ED labile ranging from -190s.     Respiratory  #AHRF i/s/o MCA Stroke w/ Asp Pna   CT Chest showing signs of possible aspiration pna i/s/o intubation. WBC 12 however likely reactive i/s/o stroke. Afebrile.   - will obtain sputum cx  - aspiration precautions   - Continue with Ertapenam 1000mg IVP for 4 days (Day 2)    Renal  #Permissive Hypernatremia i/s/o MCA Infarct   Na 136 on admission. Per protocol, NaCl 3% 100cc bolus.    Na -- goal 145-155    #Indwelling mathew  - monitor urine output -- placed in MICU    Heme  #Elevated Hgb  HGB 15 on admission likely i/s/o hemoconcentration.   - f/u repeat on AM labs    Endo  #Hx of T2DM  Hx of T2DM. A1c and home medications unknown.  - f/u A1c in AM  - started Kaiden     ID  See pulm    Prophylaxis:  - DVT: SCDs   - GI: none      89yo female with PMHx of HTN, HLD, T2DM presents to [facility] as a tx from [facility] i/s/o concern for large L sided MCA stroke. ICU consulted for further management.     Neuro  #MCA Stroke  #MCA Thrombus  Pt intubated @ [facility] i/s/o airway protection for suspected large L MCA stroke. CT brain stroke protocol showing opacified b/l MCAs L>R, cannot exclude thrombus. Per Neurosurgical team, CT brain perfusion showing infarction around MCA territory which is possibly too substantial to allow for adequate brain recovery even if intervention is done on thrombus.  Per Neurosurg, no plan for intervention on thrombus and MCA stroke expected to enlarge with larger infarction to be expected with possible cerebral edema however no edema witnessed on scans at this time.     - Patient weaned off sedation  - strict BP control w/ SBP <180  - Na goal 145-155 i/s/o stroke -- Na of 155 today   - No ASA per neurosurgery  - Palliative consulted i/s/o possible worsening infarction with no intervention -- family support needed   - Non con Head CT performed 09/20 with slight improvement. No hemorrhagic transformation  - Expedite vEEG for today  - Start aspirin  - Follow neuro recs: Serum osm 340, mannitol discontinued.   - Discontinue D5 fluids- sodium within goal         Cards  #Afib  New onset Afib with HR 120s. Patient bradycardiac over last few days  - Hold off on medications but can give diltiazem 5mg if pt becomes uncontrolled    #Hx of HTN  #BP Control  Will exhibit strict BP control i/s/o stroke. BPs in ED labile ranging from -190s.     Respiratory  #AHRF i/s/o MCA Stroke w/ Asp Pna   CT Chest showing signs of possible aspiration pna i/s/o intubation. WBC 12 however likely reactive i/s/o stroke. Afebrile.   - aspiration precautions   - Continue with Ertapenam 1000mg IVP for 4 days (Day 2)    Renal  #Permissive Hypernatremia i/s/o MCA Infarct   Na 136 on admission. Per protocol, NaCl 3% 100cc bolus.    Na -- goal 145-155    #Indwelling mathew  - monitor urine output -- placed in MICU    Heme  #Elevated Hgb  HGB 15 on admission likely i/s/o hemoconcentration.   - f/u repeat on AM labs    Endo  #Hx of T2DM  Hx of T2DM. A1c and home medications unknown.  - f/u A1c in AM  - started Kaiden     ID  See pulm    Prophylaxis:  - DVT: SCDs   - GI: none      87yo female with PMHx of HTN, HLD, T2DM presents to [facility] as a tx from [facility] i/s/o concern for large L sided MCA stroke. ICU consulted for further management.     Neuro  #MCA Stroke  #MCA Thrombus  Pt intubated @ [facility] i/s/o airway protection for suspected large L MCA stroke. CT brain stroke protocol showing opacified b/l MCAs L>R, cannot exclude thrombus. Per Neurosurgical team, CT brain perfusion showing infarction around MCA territory which is possibly too substantial to allow for adequate brain recovery even if intervention is done on thrombus.  Per Neurosurg, no plan for intervention on thrombus and MCA stroke expected to enlarge with larger infarction to be expected with possible cerebral edema however no edema witnessed on scans at this time.     - Patient weaned off sedation  - strict BP control w/ SBP <180  - Na goal 145-155 i/s/o stroke -- Na of 155 today   - No ASA per neurosurgery  - Palliative consulted i/s/o possible worsening infarction with no intervention -- family support needed   - Non con Head CT performed 09/20 with slight improvement. No hemorrhagic transformation  - Expedite vEEG for today  - Start aspirin  - Follow neuro recs: Serum osm 340, mannitol discontinued.   - Discontinue D5 fluids- sodium within goal         Cards  #Afib  New onset Afib with HR 120s. Patient bradycardiac over last few days  - Hold off on medications but can give diltiazem 5mg if pt becomes uncontrolled    #Hx of HTN  #BP Control  Will exhibit strict BP control i/s/o stroke. BPs in ED labile ranging from -190s.     Respiratory  #Acute Hypoxic Respiratory Failure i/s/o MCA Stroke w/ Asp Pna  CT Chest showing signs of possible aspiration pna i/s/o intubation. WBC 12 however likely reactive i/s/o stroke. Afebrile.   - aspiration precautions   - Continue with Ertapenam 1000mg IVP for 4 days (Day 2)    Renal  #Permissive Hypernatremia i/s/o MCA Infarct   Na 136 on admission. Per protocol, NaCl 3% 100cc bolus.    Na -- goal 145-155    #Indwelling mathew  - monitor urine output -- placed in MICU    Heme  #Elevated Hgb  HGB 15 on admission likely i/s/o hemoconcentration.   - f/u repeat on AM labs    Endo  #Hx of T2DM  Hx of T2DM. A1c and home medications unknown.  - f/u A1c in AM  - started Kaiden     ID  See pulm    Prophylaxis:  - DVT: SCDs   - GI: none

## 2023-09-21 NOTE — PROGRESS NOTE ADULT - SUBJECTIVE AND OBJECTIVE BOX
Neurology Stroke Progress Note    INTERVAL HPI/OVERNIGHT EVENTS:  Patient seen and examined. Remains intubated off of sedation.     MEDICATIONS  (STANDING):  aspirin  chewable 81 milliGRAM(s) Oral every 24 hours  atorvastatin 80 milliGRAM(s) Oral at bedtime  chlorhexidine 0.12% Liquid 15 milliLiter(s) Oral Mucosa every 12 hours  chlorhexidine 2% Cloths 1 Application(s) Topical <User Schedule>  dextrose 5%. 1000 milliLiter(s) (100 mL/Hr) IV Continuous <Continuous>  dextrose 5%. 1000 milliLiter(s) (50 mL/Hr) IV Continuous <Continuous>  dextrose 50% Injectable 25 Gram(s) IV Push once  dextrose 50% Injectable 25 Gram(s) IV Push once  dextrose 50% Injectable 12.5 Gram(s) IV Push once  ertapenem  IVPB 1000 milliGRAM(s) IV Intermittent every 24 hours  glucagon  Injectable 1 milliGRAM(s) IntraMuscular once  insulin lispro (ADMELOG) corrective regimen sliding scale   SubCutaneous every 6 hours  levETIRAcetam  IVPB 250 milliGRAM(s) IV Intermittent every 12 hours    MEDICATIONS  (PRN):  dextrose Oral Gel 15 Gram(s) Oral once PRN Blood Glucose LESS THAN 70 milliGRAM(s)/deciliter      Allergies    No Known Allergies    Intolerances        Vital Signs Last 24 Hrs  T(C): 37.7 (21 Sep 2023 09:40), Max: 38.1 (21 Sep 2023 00:57)  T(F): 99.9 (21 Sep 2023 09:40), Max: 100.6 (21 Sep 2023 00:57)  HR: 74 (21 Sep 2023 12:00) (72 - 120)  BP: 117/64 (21 Sep 2023 12:00) (102/58 - 149/84)  BP(mean): 84 (21 Sep 2023 12:00) (73 - 113)  RR: 30 (21 Sep 2023 12:00) (12 - 30)  SpO2: 100% (21 Sep 2023 12:00) (97% - 100%)    Parameters below as of 21 Sep 2023 12:00  Patient On (Oxygen Delivery Method): ventilator    O2 Concentration (%): 30    Physical exam: Intubated   General: Eye closed, will not open to noxious, intubated  Eyes: Anicteric sclerae  Neck: trachea midline; ET tube in place  Cardiovascular: Afib  Pulmonary: No use of accessory muscles  Extremities: no edema    Neurologic:  -Mental status: AAOx0; intubated; does not open eyes to noxious; does not follow commands  -Cranial nerves:   II: Does not BTT bilaterally  III, IV, VI: Pupils equally round and reactive to light; appears to have a left gaze preference  V:  Unable to test facial sensation  VII:  Right facial droop   VIII: Unable to test hearing  Motor: Withdraws to noxious on LUE/LLE, 2/5; RUE: 1/5 with finger movement; RLE triple flexion     Sensation: localizes noxious stimulus with sternal rub with LUE; withdraws to pain in LUE/LLE but not RUE/RLE.   Coordination: Unable to test    LABS:                        12.7   9.71  )-----------( 164      ( 21 Sep 2023 06:26 )             40.7     09-21    154<H>  |  121<H>  |  33<H>  ----------------------------<  298<H>  4.5   |  26  |  0.66    Ca    9.7      21 Sep 2023 06:26  Phos  3.8     09-21  Mg     2.6     09-21    TPro  6.2  /  Alb  3.3  /  TBili  0.4  /  DBili  x   /  AST  27  /  ALT  10  /  AlkPhos  55  09-21      Urinalysis Basic - ( 21 Sep 2023 06:26 )    Color: x / Appearance: x / SG: x / pH: x  Gluc: 298 mg/dL / Ketone: x  / Bili: x / Urobili: x   Blood: x / Protein: x / Nitrite: x   Leuk Esterase: x / RBC: x / WBC x   Sq Epi: x / Non Sq Epi: x / Bacteria: x        RADIOLOGY & ADDITIONAL TESTS:  < from: CT Head No Cont (09.20.23 @ 18:33) >  IMPRESSION:    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 No hemorrhagic transformation.    < end of copied text >     Neurology Stroke Progress Note    INTERVAL HPI/OVERNIGHT EVENTS:  Patient seen and examined. Remains intubated off of sedation.     MEDICATIONS  (STANDING):  aspirin  chewable 81 milliGRAM(s) Oral every 24 hours  atorvastatin 80 milliGRAM(s) Oral at bedtime  chlorhexidine 0.12% Liquid 15 milliLiter(s) Oral Mucosa every 12 hours  chlorhexidine 2% Cloths 1 Application(s) Topical <User Schedule>  dextrose 5%. 1000 milliLiter(s) (100 mL/Hr) IV Continuous <Continuous>  dextrose 5%. 1000 milliLiter(s) (50 mL/Hr) IV Continuous <Continuous>  dextrose 50% Injectable 25 Gram(s) IV Push once  dextrose 50% Injectable 25 Gram(s) IV Push once  dextrose 50% Injectable 12.5 Gram(s) IV Push once  ertapenem  IVPB 1000 milliGRAM(s) IV Intermittent every 24 hours  glucagon  Injectable 1 milliGRAM(s) IntraMuscular once  insulin lispro (ADMELOG) corrective regimen sliding scale   SubCutaneous every 6 hours  levETIRAcetam  IVPB 250 milliGRAM(s) IV Intermittent every 12 hours    MEDICATIONS  (PRN):  dextrose Oral Gel 15 Gram(s) Oral once PRN Blood Glucose LESS THAN 70 milliGRAM(s)/deciliter    Allergies  No Known Allergies    Intolerances    Vital Signs Last 24 Hrs  T(C): 37.7 (21 Sep 2023 09:40), Max: 38.1 (21 Sep 2023 00:57)  T(F): 99.9 (21 Sep 2023 09:40), Max: 100.6 (21 Sep 2023 00:57)  HR: 74 (21 Sep 2023 12:00) (72 - 120)  BP: 117/64 (21 Sep 2023 12:00) (102/58 - 149/84)  BP(mean): 84 (21 Sep 2023 12:00) (73 - 113)  RR: 30 (21 Sep 2023 12:00) (12 - 30)  SpO2: 100% (21 Sep 2023 12:00) (97% - 100%)    Parameters below as of 21 Sep 2023 12:00  Patient On (Oxygen Delivery Method): ventilator    O2 Concentration (%): 30    Physical exam: Intubated   General: Eye closed, will not open to noxious, intubated  Eyes: Anicteric sclerae  Neck: trachea midline; ET tube in place  Cardiovascular: Afib  Pulmonary: No use of accessory muscles  Extremities: no edema    Neurologic:  -Mental status: AAOx0; intubated; does not open eyes to noxious; does not follow commands  -Cranial nerves:   II: Does not BTT bilaterally  III, IV, VI: Pupils equally round and reactive to light; appears to have a left gaze preference  V:  Unable to test facial sensation  VII:  Right facial droop   VIII: Unable to test hearing  Motor: Withdraws to noxious on LUE/LLE, 2/5; RUE: 1/5 with finger movement; RLE triple flexion     Sensation: localizes noxious stimulus with sternal rub with LUE; withdraws to pain in LUE/LLE but not RUE/RLE.   Coordination: Unable to test    LABS:                        12.7   9.71  )-----------( 164      ( 21 Sep 2023 06:26 )             40.7     09-21    154<H>  |  121<H>  |  33<H>  ----------------------------<  298<H>  4.5   |  26  |  0.66    Ca    9.7      21 Sep 2023 06:26  Phos  3.8     09-21  Mg     2.6     09-21    TPro  6.2  /  Alb  3.3  /  TBili  0.4  /  DBili  x   /  AST  27  /  ALT  10  /  AlkPhos  55  09-21    Urinalysis Basic - ( 21 Sep 2023 06:26 )    Color: x / Appearance: x / SG: x / pH: x  Gluc: 298 mg/dL / Ketone: x  / Bili: x / Urobili: x   Blood: x / Protein: x / Nitrite: x   Leuk Esterase: x / RBC: x / WBC x   Sq Epi: x / Non Sq Epi: x / Bacteria: x        RADIOLOGY & ADDITIONAL TESTS:  < from: CT Head No Cont (09.20.23 @ 18:33) >  IMPRESSION:    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 No hemorrhagic transformation.    < end of copied text >

## 2023-09-21 NOTE — PROGRESS NOTE ADULT - ASSESSMENT
87yo Female with PMHX of HTN, HLD, T2DM, and TIA (2.5 weeks ago; pt with L sided weakness, was admitted to [facility] and discharged on ASA without residual deficits) BIBEMS to [facility] ED as transfer from [facility] for L MCA stroke as possible thrombectomy candidate. Pt intubated at [place] and found to hyperdense L MCA on noncon CTH. NIHSS 32 on arrival. CTH with evidence of acute L MCA infarct, CTP with mismatch volume of 238cc with mismatch ratio 6.8 within L MCA territory, CTA H/N with left MCA bifurcation occlusion, free air in right carotid deep spaces likely due to traumatic intubation. Pt out of window for thrombolytic treatment. After discussion between Dr. Ontiveros and Dr. Plascencia, patient was deemed not a candidate for mechanical thrombectomy given ischemic changes along L MCA territory on CTH with large correlating core on CTP. Throughout admission Afib noted on the monitor. New-onset afib in RVR with heart rate >100, today up to 120-144. CTH on 9/18 with increase in mass shift 5 to 8mm, 3% started as well as 50mg mannitol 20%. Today, Na: 155 this Am and Na:154 with afternoon BMP. Pt BMP and Osm repeated Q6 with mannitol only given is Osm <320. Repeat CTH today. Pt E.coli ESBL +, 100.9 fever today ertapenem started yesterday x 5 days. Palliative care consulted, GOC discussion being had, at this time, family needs more time before any making any decisions.     Stroke etiology likely embolic source (cardioembolic vs hypercoagulable state), fetal PCA noted on CTA possibly explaining multiple territory CVA    Recommendations   Cytotoxic swelling management  - okay to check BMP and osmolality BID with goal Na 145-155  - no role for additional mannitol at this time  - continue to wean off hypertonic saline  - NRSGY following for enmanuel-crani watch, f/u recs  - If any questions about hyperosmotic/hypertonic therapy, please reach out to neuro ICU on call    CVA ( L MCA and L PCA infarct)  - please continue start ASA 81mg  - continue Atorvastatin 80mg PO daily  - recommend SBP goal <180  - recommend q1hr coma checks and vitals   - provide stroke education  - obtain collateral from [facility]     Seizure-like activity   - continue Keppra 250mg BID for now; will follow up official vEEG report  - if vEEG negative for seizure activity, okay to discontinue Keppra 250mg BID    Palliative care following, GOC discussion started with family, at this time, family needs more time before any making any decisions. Pt full code     Discussed with Neurology Attending Dr. Mustafa and Dr. Elizondo   87yo Female with PMHX of HTN, HLD, T2DM, and TIA (2.5 weeks ago; pt with L sided weakness, was admitted to [facility] and discharged on ASA without residual deficits) BIBEMS to [facility] ED as transfer from [facility] for L MCA stroke as possible thrombectomy candidate. Pt intubated at [place] and found to hyperdense L MCA on noncon CTH. NIHSS 32 on arrival. CTH with evidence of acute L MCA infarct, CTP with mismatch volume of 238cc with mismatch ratio 6.8 within L MCA territory, CTA H/N with left MCA bifurcation occlusion, free air in right carotid deep spaces likely due to traumatic intubation. Pt out of window for thrombolytic treatment. After discussion between Dr. Ontiveros and Dr. Plascencia, patient was deemed not a candidate for mechanical thrombectomy given ischemic changes along L MCA territory on CTH with large correlating core on CTP. Throughout admission Afib noted on the monitor. New-onset afib in RVR with heart rate >100, today up to 120-144. CTH on 9/18 with increase in mass shift 5 to 8mm, 3% started as well as 50mg mannitol 20%. Today, Na: 155 this Am and Na:154 with afternoon BMP. Pt BMP and Osm repeated Q6 with mannitol only given is Osm <320. Repeat CTH today. Pt E.coli ESBL +, 100.9 fever today ertapenem started yesterday x 5 days. Palliative care consulted, GOC discussion being had, at this time, family needs more time before any making any decisions.     Stroke etiology likely embolic source (cardioembolic vs hypercoagulable state), fetal PCA noted on CTA possibly explaining multiple territory CVA    Recommendations   Cytotoxic swelling management  - okay to check BMP and osmolality BID with goal Na 145-155  - no role for additional mannitol at this time  - continue to wean off hypertonic saline  - NRSGY following for enmanuel-crani watch, f/u recs  - If any questions about hyperosmotic/hypertonic therapy, please reach out to neuro ICU on call    CVA ( L MCA and L PCA infarct)  - please continue start ASA 81mg  - continue Atorvastatin 80mg PO daily  - recommend SBP goal <180  - recommend q1hr coma checks and vitals   - provide stroke education  - obtain collateral from [facility]     Seizure-like activity   - continue Keppra 250mg BID for now; will follow up official vEEG report  - if vEEG negative for seizure activity, okay to discontinue Keppra 250mg BID    Palliative care following, GOC discussion started with family, at this time, family needs more time before any making any decisions. Pt full code     Discussed with Neurology Attending Dr. Mustafa and Dr. Elizondo    Recommendation see above: reviewed and edited as needed.    Ugo Elizondo MD PHD  Vascular neurology fellow PGY5

## 2023-09-21 NOTE — PROGRESS NOTE ADULT - SUBJECTIVE AND OBJECTIVE BOX
INTERVAL HPI/OVERNIGHT EVENTS: Tylenol given for fever 100.6 (rectal temp). Kept on D5 overnight. Serum osm within goal, no mannitol given.     Subjective: Patient seen and examined at bedside. AAOx0.     ICU Vital Signs Last 24 Hrs  ICU Vital Signs Last 24 Hrs  T(C): 37.8 (21 Sep 2023 06:03), Max: 38.3 (20 Sep 2023 13:00)  T(F): 100 (21 Sep 2023 06:03), Max: 100.9 (20 Sep 2023 13:00)  HR: 105 (21 Sep 2023 07:00) (102 - 120)  BP: 139/96 (21 Sep 2023 07:00) (102/58 - 154/103)  BP(mean): 113 (21 Sep 2023 07:00) (73 - 119)  ABP: --  ABP(mean): --  RR: 18 (21 Sep 2023 07:00) (12 - 25)  SpO2: 100% (21 Sep 2023 07:00) (97% - 100%)    O2 Parameters below as of 21 Sep 2023 08:00  Patient On (Oxygen Delivery Method): ventilator    O2 Concentration (%): 30    Mode: AC/ CMV (Assist Control/ Continuous Mandatory Ventilation)  RR (machine): 12  TV (machine): 300  FiO2: 30  PEEP: 5  ITime: 1  MAP: 6  PIP: 17    PHYSICAL EXAM:  General: thin; frail  HEENT: NC/AT; PERRL  Neck: intubated   Cardiovascular: Irregular rate w/ normal rhythym, +S1/S2; NO M/R/G  Respiratory: CTA B/L; no W/R/R  Gastrointestinal: soft, NT/ND; +BSx4  Extremities: WWP; no edema or cyanosis; LUE cooler than rest, palpable radial and dp pulses   Vascular: 2+ radial, DP/PT pulses B/L  Neurological: AAOx0; intubated. Pt occasionally opens eyes and tracks. Withdraws to painful stimuli in 4/4 extremities     I&O's Summary    I&O's Detail    20 Sep 2023 07:01  -  21 Sep 2023 07:00  --------------------------------------------------------  IN:    dextrose 5%: 210 mL    IV PiggyBack: 100 mL    Jevity 1.2: 1121 mL  Total IN: 1431 mL    OUT:    Indwelling Catheter - Urethral (mL): 1095 mL  Total OUT: 1095 mL    Total NET: 336 mL    VENT SETTINGS:  Mode: AC/ CMV (Assist Control/ Continuous Mandatory Ventilation)  RR (machine): 12  TV (machine): 300  FiO2: 35  PEEP: 5  ITime: 1  MAP: 7  PIP: 14      LABS:                                   12.7   9.71  )-----------( 164      ( 21 Sep 2023 06:26 )             40.7     09-21    154<H>  |  121<H>  |  33<H>  ----------------------------<  298<H>  4.5   |  26  |  0.66    Ca    9.7      21 Sep 2023 06:26  Phos  3.8     09-21  Mg     2.6     09-21    TPro  6.2  /  Alb  3.3  /  TBili  0.4  /  DBili  x   /  AST  27  /  ALT  10  /  AlkPhos  55  09-21      Urinalysis Basic - ( 21 Sep 2023 06:26 )    Color: x / Appearance: x / SG: x / pH: x  Gluc: 298 mg/dL / Ketone: x  / Bili: x / Urobili: x   Blood: x / Protein: x / Nitrite: x   Leuk Esterase: x / RBC: x / WBC x   Sq Epi: x / Non Sq Epi: x / Bacteria: x      RADIOLOGY, EKG & ADDITIONAL TESTS: Reviewed.     CAPILLARY BLOOD GLUCOSE      POCT Blood Glucose.: 158 mg/dL (19 Sep 2023 11:23)  POCT Blood Glucose.: 146 mg/dL (18 Sep 2023 16:49)      Consultant(s) Notes Reviewed:  [x ] YES  [ ] NO    MEDICATIONS  (STANDING):  atorvastatin 80 milliGRAM(s) Oral at bedtime  chlorhexidine 0.12% Liquid 15 milliLiter(s) Oral Mucosa every 12 hours  chlorhexidine 2% Cloths 1 Application(s) Topical <User Schedule>  dextrose 5%. 1000 milliLiter(s) (50 mL/Hr) IV Continuous <Continuous>  dextrose 5%. 1000 milliLiter(s) (100 mL/Hr) IV Continuous <Continuous>  dextrose 50% Injectable 25 Gram(s) IV Push once  dextrose 50% Injectable 25 Gram(s) IV Push once  dextrose 50% Injectable 12.5 Gram(s) IV Push once  glucagon  Injectable 1 milliGRAM(s) IntraMuscular once  insulin lispro (ADMELOG) corrective regimen sliding scale   SubCutaneous every 6 hours  levETIRAcetam  IVPB 500 milliGRAM(s) IV Intermittent every 12 hours    MEDICATIONS  (PRN):  dextrose Oral Gel 15 Gram(s) Oral once PRN Blood Glucose LESS THAN 70 milliGRAM(s)/deciliter      Care Discussed with Consultants/Other Providers [ x] YES  [ ] NO    RADIOLOGY & ADDITIONAL TESTS: INTERVAL HPI/OVERNIGHT EVENTS: Tylenol given for fever 100.6 (rectal temp). Kept on D5 overnight. Serum osm within goal, no mannitol given.     Subjective: Patient seen and examined at bedside. AAOx0.     ICU Vital Signs Last 24 Hrs  ICU Vital Signs Last 24 Hrs  T(C): 37.8 (21 Sep 2023 06:03), Max: 38.3 (20 Sep 2023 13:00)  T(F): 100 (21 Sep 2023 06:03), Max: 100.9 (20 Sep 2023 13:00)  HR: 105 (21 Sep 2023 07:00) (102 - 120)  BP: 139/96 (21 Sep 2023 07:00) (102/58 - 154/103)  BP(mean): 113 (21 Sep 2023 07:00) (73 - 119)  ABP: --  ABP(mean): --  RR: 18 (21 Sep 2023 07:00) (12 - 25)  SpO2: 100% (21 Sep 2023 07:00) (97% - 100%)    O2 Parameters below as of 21 Sep 2023 08:00  Patient On (Oxygen Delivery Method): ventilator    O2 Concentration (%): 30    Mode: AC/ CMV (Assist Control/ Continuous Mandatory Ventilation)  RR (machine): 12  TV (machine): 300  FiO2: 30  PEEP: 5  ITime: 1  MAP: 6  PIP: 17    I&O's Detail    20 Sep 2023 07:01  -  21 Sep 2023 07:00  --------------------------------------------------------  IN:    dextrose 5%: 210 mL    IV PiggyBack: 100 mL    Jevity 1.2: 1121 mL  Total IN: 1431 mL    OUT:    Indwelling Catheter - Urethral (mL): 1095 mL  Total OUT: 1095 mL    Total NET: 336 mL          PHYSICAL EXAM:  General: thin; frail  HEENT: NC/AT; PERRL  Neck: intubated   Cardiovascular: Irregular rate w/ normal rhythym, +S1/S2; NO M/R/G  Respiratory: CTA B/L; no W/R/R  Gastrointestinal: soft, NT/ND; +BSx4  Extremities: WWP; no edema or cyanosis; LUE cooler than rest, palpable radial and dp pulses   Vascular: 2+ radial, DP/PT pulses B/L  Neurological: AAOx0; intubated. Pt occasionally opens eyes and tracks. Withdraws to painful stimuli in 4/4 extremities     I&O's Summary    I&O's Detail    20 Sep 2023 07:01  -  21 Sep 2023 07:00  --------------------------------------------------------  IN:    dextrose 5%: 210 mL    IV PiggyBack: 100 mL    Jevity 1.2: 1121 mL  Total IN: 1431 mL    OUT:    Indwelling Catheter - Urethral (mL): 1095 mL  Total OUT: 1095 mL    Total NET: 336 mL    VENT SETTINGS:  Mode: AC/ CMV (Assist Control/ Continuous Mandatory Ventilation)  RR (machine): 12  TV (machine): 300  FiO2: 35  PEEP: 5  ITime: 1  MAP: 7  PIP: 14      LABS:                                   12.7   9.71  )-----------( 164      ( 21 Sep 2023 06:26 )             40.7     09-21    154<H>  |  121<H>  |  33<H>  ----------------------------<  298<H>  4.5   |  26  |  0.66    Ca    9.7      21 Sep 2023 06:26  Phos  3.8     09-21  Mg     2.6     09-21    TPro  6.2  /  Alb  3.3  /  TBili  0.4  /  DBili  x   /  AST  27  /  ALT  10  /  AlkPhos  55  09-21      Urinalysis Basic - ( 21 Sep 2023 06:26 )    Color: x / Appearance: x / SG: x / pH: x  Gluc: 298 mg/dL / Ketone: x  / Bili: x / Urobili: x   Blood: x / Protein: x / Nitrite: x   Leuk Esterase: x / RBC: x / WBC x   Sq Epi: x / Non Sq Epi: x / Bacteria: x      RADIOLOGY, EKG & ADDITIONAL TESTS: Reviewed.     CAPILLARY BLOOD GLUCOSE      POCT Blood Glucose.: 158 mg/dL (19 Sep 2023 11:23)  POCT Blood Glucose.: 146 mg/dL (18 Sep 2023 16:49)      Consultant(s) Notes Reviewed:  [x ] YES  [ ] NO    MEDICATIONS  (STANDING):  atorvastatin 80 milliGRAM(s) Oral at bedtime  chlorhexidine 0.12% Liquid 15 milliLiter(s) Oral Mucosa every 12 hours  chlorhexidine 2% Cloths 1 Application(s) Topical <User Schedule>  dextrose 5%. 1000 milliLiter(s) (50 mL/Hr) IV Continuous <Continuous>  dextrose 5%. 1000 milliLiter(s) (100 mL/Hr) IV Continuous <Continuous>  dextrose 50% Injectable 25 Gram(s) IV Push once  dextrose 50% Injectable 25 Gram(s) IV Push once  dextrose 50% Injectable 12.5 Gram(s) IV Push once  glucagon  Injectable 1 milliGRAM(s) IntraMuscular once  insulin lispro (ADMELOG) corrective regimen sliding scale   SubCutaneous every 6 hours  levETIRAcetam  IVPB 500 milliGRAM(s) IV Intermittent every 12 hours    MEDICATIONS  (PRN):  dextrose Oral Gel 15 Gram(s) Oral once PRN Blood Glucose LESS THAN 70 milliGRAM(s)/deciliter      Care Discussed with Consultants/Other Providers [ x] YES  [ ] NO    RADIOLOGY & ADDITIONAL TESTS:

## 2023-09-22 NOTE — GOALS OF CARE CONVERSATION - ADVANCED CARE PLANNING - CONVERSATION DETAILS
Language Line French  # [id number]  Discussed patient's overall medical course since admission. MICU explained how she is not waking up and on a ventilator to protect her airway. Neuro explained the extent of her stroke and then what sorts of deficits are expected. She explained that the patient is unlikely to wake up and that even if she does wake up, she will have severe deficits in her ability to understand and express language (inability to talk), severe weakness on the R side and inability to see the R side of her vision field. Family expressed understanding but asked if we could ultimately say there was no chance of waking up and we could not. However, we discussed what "waking up" looked like and that it was not like waking up from sleep. She would not be able to communicate though she may understand some things said to her, she would not be able to eat and swallow and she would not be able to walk. The options in front of them are to do a tracheostomy, which needs to be decided on by the end of the next week, and with tracheostomy also place a permanent feeding tube. We discussed that she would have to go to a long term care facility if she underwent tracheostomy and would never be able to go back home. Dr. Shin explained the many risks associated with longterm ventilation, including recurrent infections and recurrent hospital admissions.    Family seemed to understand but then Brayan began to express frustration and anger at the nursing care the patient received. We attempted to address his concerns, and the MICU team explained that they would address these issues with nursing and the nurse manager. Brayan then expressed a wish for the patient to be transferred to [facility] and after explanation of why she would not be accepted on transfer there, we shifted discussion back to patient's medical condition. We explained the that by the middle of next week, the family needs to decide on whether they want a tracheostomy for the patient knowing that she is unlikely to improve significantly past the point she is in now, or if they do not want to do that, to decide on extubating her and focusing on her comfort.    After other providers left, family discussed with Palliative Attending privately about options in front of them. We discussed being in a vent facility after tracheostomy to which patient's daughter said "I can't do that to her." I responded that it seemed like she had already made up her mind and she did not answer directly but shook her head. I explained that though no final decision needs to be made now, at our next family meeting Wednesday, we do need to decide. And if they decide against a tracheostomy, they would not need to withdraw her breathing tube right away but could take a couple more days to decide when to extubate her. What it would look like after that ist that we would remove all the tubes, including the feeding tube, and focus on her comfort, giving her medications if she was uncomfortable. They asked for a prognosis and I explained likely days to weeks once she was taken off the ventilator. Family expressed appreciation for the explanation and agreed to meet again on Wednesday for further discussion. Language Line Spanish  # [phone]  Discussed patient's overall medical course since admission. MICU explained how she is not waking up and on a ventilator to protect her airway. Neuro explained the extent of her stroke and then what sorts of deficits are expected. She explained that the patient is unlikely to wake up and that even if she does wake up, she will have severe deficits in her ability to understand and express language (inability to talk), severe weakness on the R side and inability to see the R side of her vision field. Family expressed understanding but asked if we could ultimately say there was no chance of waking up and we could not. However, we discussed what "waking up" looked like and that it was not like waking up from sleep. She would not be able to communicate though she may understand some things said to her, she would not be able to eat and swallow and she would not be able to walk. The options in front of them are to do a tracheostomy, which needs to be decided on by the end of the next week, and with tracheostomy also place a permanent feeding tube. We discussed that she would have to go to a long term care facility if she underwent tracheostomy and would never be able to go back home. Dr. Shin explained the many risks associated with longterm ventilation, including recurrent infections and recurrent hospital admissions.    Family seemed to understand but then Brayan began to express frustration and anger at the nursing care the patient received. We attempted to address his concerns, and the MICU team explained that they would address these issues with nursing and the nurse manager. Brayan then expressed a wish for the patient to be transferred to [facility] and after explanation of why she would not be accepted on transfer there, we shifted discussion back to patient's medical condition. We explained the that by the middle of next week, the family needs to decide on whether they want a tracheostomy for the patient knowing that she is unlikely to improve significantly past the point she is in now, or if they do not want to do that, to decide on extubating her and focusing on her comfort.    After other providers left, family discussed with Palliative Attending privately about options in front of them. We discussed being in a vent facility after tracheostomy to which patient's daughter said "I can't do that to her." I responded that it seemed like she had already made up her mind and she did not answer directly but shook her head. I explained that though no final decision needs to be made now, at our next family meeting Wednesday, we do need to decide. And if they decide against a tracheostomy, they would not need to withdraw her breathing tube right away but could take a couple more days to decide when to extubate her. What it would look like after that ist that we would remove all the tubes, including the feeding tube, and focus on her comfort, giving her medications if she was uncomfortable. They asked for a prognosis and I explained likely days to weeks once she was taken off the ventilator. Family expressed appreciation for the explanation and agreed to meet again on Wednesday for further discussion.    In addition to the E/M visit, an advance care planning meeting was performed. Start time: 3:00pm; End time: 4:46pm; Total time: 106min. A face to face meeting to discuss advance care planning was held today regarding: AMANDA JESSICA. Primary decision maker: Patient is unable to participate in decision making; Alternate/surrogate: Guero Anaya. Discussed advance directives including, but not limited to, healthcare proxy and code status. Decision regarding code status: FULL CODE; Documentation completed today: [facility] note

## 2023-09-22 NOTE — PROGRESS NOTE ADULT - SUBJECTIVE AND OBJECTIVE BOX
Neurology Stroke Progress Note    INTERVAL HPI/OVERNIGHT EVENTS:  Patient seen and examined. Remains intubated, not on sedation.     MEDICATIONS  (STANDING):  aspirin  chewable 81 milliGRAM(s) Oral every 24 hours  atorvastatin 80 milliGRAM(s) Oral at bedtime  chlorhexidine 0.12% Liquid 15 milliLiter(s) Oral Mucosa every 12 hours  chlorhexidine 2% Cloths 1 Application(s) Topical <User Schedule>  dextrose 5%. 1000 milliLiter(s) (50 mL/Hr) IV Continuous <Continuous>  dextrose 5%. 1000 milliLiter(s) (100 mL/Hr) IV Continuous <Continuous>  dextrose 5%. 1000 milliLiter(s) (40 mL/Hr) IV Continuous <Continuous>  dextrose 50% Injectable 25 Gram(s) IV Push once  dextrose 50% Injectable 25 Gram(s) IV Push once  dextrose 50% Injectable 12.5 Gram(s) IV Push once  ertapenem  IVPB 1000 milliGRAM(s) IV Intermittent every 24 hours  glucagon  Injectable 1 milliGRAM(s) IntraMuscular once  insulin lispro (ADMELOG) corrective regimen sliding scale   SubCutaneous every 6 hours  levETIRAcetam  IVPB 250 milliGRAM(s) IV Intermittent every 12 hours    MEDICATIONS  (PRN):  dextrose Oral Gel 15 Gram(s) Oral once PRN Blood Glucose LESS THAN 70 milliGRAM(s)/deciliter      Allergies    No Known Allergies    Intolerances        Vital Signs Last 24 Hrs  T(C): 37.9 (22 Sep 2023 10:08), Max: 38 (21 Sep 2023 18:00)  T(F): 100.2 (22 Sep 2023 10:08), Max: 100.4 (21 Sep 2023 18:00)  HR: 127 (22 Sep 2023 11:00) (64 - 146)  BP: 122/69 (22 Sep 2023 11:00) (105/65 - 161/79)  BP(mean): 87 (22 Sep 2023 11:00) (78 - 113)  RR: 20 (22 Sep 2023 11:00) (14 - 37)  SpO2: 98% (22 Sep 2023 11:00) (98% - 100%)    Parameters below as of 22 Sep 2023 11:00      O2 Concentration (%): 30    Physical exam: Intubated   General: Eye closed, will not open to noxious, intubated  Eyes: Anicteric sclerae  Neck: trachea midline; ET tube in place  Cardiovascular: Afib  Pulmonary: No use of accessory muscles  Extremities: no edema    Neurologic:  -Mental status: AAOx0; intubated; does not open eyes to noxious; does not follow commands  -Cranial nerves:   II: Does not BTT bilaterally; brisk corneal reflex of left eye, more sluggish during testing of right eye   III, IV, VI: Pupils equally round and reactive to light; appears to have a left gaze preference  V:  Unable to test facial sensation  VII:  Right facial droop   VIII: Unable to test hearing  Motor: Withdraws to noxious on LUE/LLE, 2/5; RUE: 0/5; RLE triple flexion     Sensation: localizes noxious stimulus with sternal rub with LUE; withdraws to pain in LUE/LLE but not RUE/RLE.   Coordination: Unable to test  LABS:                        12.3   9.99  )-----------( 148      ( 22 Sep 2023 04:49 )             39.4     09-22    155<H>  |  118<H>  |  41<H>  ----------------------------<  246<H>  4.3   |  28  |  0.68    Ca    10.3      22 Sep 2023 06:47  Phos  4.8     09-22  Mg     2.6     09-22    TPro  6.3  /  Alb  3.4  /  TBili  0.4  /  DBili  x   /  AST  25  /  ALT  10  /  AlkPhos  61  09-22      Urinalysis Basic - ( 22 Sep 2023 06:47 )    Color: x / Appearance: x / SG: x / pH: x  Gluc: 246 mg/dL / Ketone: x  / Bili: x / Urobili: x   Blood: x / Protein: x / Nitrite: x   Leuk Esterase: x / RBC: x / WBC x   Sq Epi: x / Non Sq Epi: x / Bacteria: x        RADIOLOGY & ADDITIONAL TESTS:    < from: CT Head No Cont (09.20.23 @ 18:33) >    IMPRESSION:    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 No hemorrhagic transformation.    < end of copied text >

## 2023-09-22 NOTE — GOALS OF CARE CONVERSATION - ADVANCED CARE PLANNING - STAFF/PROVIDER
Dr. Mustafa (Neuro Attending), Dr. Shin (MICU Attending), Dr. Knapp (Palliative Attending), Jerome Magallanes (Neuro PA), Annamarie Lancaster (MICU Resident), Greta Santoyo (Fellow)

## 2023-09-22 NOTE — EEG REPORT - NS EEG TEXT BOX
[facility] Department of Neurology  Inpatient Continuous video-Electroencephalogram      Patient Name:	AMANDA JESSICA    :	1934  MRN:	[id number]    Study Start Date/Time:	2023, 10:13:08 AM  Study End Date/Time:    Referred by: Dr. Frey/Dr. Ontiveros    Brief Clinical History:  AMANDA JESSICA is a 88 year old Female with L MCA stroke; study performed to investigate for seizures or markers of epilepsy.  Diagnosis Code:  R56.9 convulsions/seizure    Pertinent Medication:  n/a    Acquisition Details:  Electroencephalography was acquired using a minimum of 21 channels on an [employer] Neurology system v 9.3.1 with electrode placement according to the standard International 10-20 system following ACNS (American Clinical Neurophysiology Society) guidelines.  Anterior temporal T1 and T2 electrodes were utilized whenever possible.  The [employer] automated spike & seizure detections were all reviewed in detail, in addition to the entire raw EEG.    Findings:  Day 1:  2023, 10:13:08 AM to next morning at 07:00 AM   Background:  continuous, with predominantly alpha/theta frequencies over the right hemisphere and theta-alpha frequencies over the left hemisphere.  Symmetry:  relative attenuation of the left hemisphere, particularly in the parasaggital regions..   Posterior Dominant Rhythm:  10 Hz, better formed over the right.   Organization: Appropriate anterior-posterior gradient.  Voltage:  Normal (20+ uV)  Variability: Yes. 		Reactivity: Yes.  N2 sleep: Symmetric, synchronous spindles and K complexes.  Spontaneous Activity:  No epileptiform discharges.  Rarely there was an increase in sharply contoured sporadic left frontal delta waves, nearly periodic.  Periodic/rhythmic activity:  None  Events:  No electrographic seizures or significant clinical events.  Provocations:  Hyperventilation and Photic stimulation: was not performed.    Daily Summary:    Continued focal slowing and relative attenuation over the left hemisphere, suggestive of focal cortical and subcortical dysfunction.   Relative attenuation can also be due to absorption of electrical activity, such as due to extra-cerebral focal fluid collections.  No definite epileptiform discharges.      Dev Gutierrez MD  Attending Neurologist, [facility] Epilepsy Program

## 2023-09-22 NOTE — PROGRESS NOTE ADULT - ASSESSMENT
89yo Female with PMHX of HTN, HLD, T2DM, and TIA (2.5 weeks ago; pt with L sided weakness, was admitted to [facility] and discharged on ASA without residual deficits) BIBEMS to [facility] ED as transfer from [facility] for L MCA stroke as possible thrombectomy candidate. Pt intubated at [place] and found to hyperdense L MCA on noncon CTH. NIHSS 32 on arrival. CTH with evidence of acute L MCA infarct, CTP with mismatch volume of 238cc with mismatch ratio 6.8 within L MCA territory, CTA H/N with left MCA bifurcation occlusion, free air in right carotid deep spaces likely due to traumatic intubation. Pt out of window for thrombolytic treatment. After discussion between Dr. Ontiveros and Dr. Plascencia, patient was deemed not a candidate for mechanical thrombectomy given ischemic changes along L MCA territory on CTH with large correlating core on CTP. Throughout admission Afib noted on the monitor. New-onset afib in RVR with heart rate >100, today up to 120-144. CTH on 9/18 with increase in mass shift 5 to 8mm, 3% started as well as 50mg mannitol 20%. Today, Na: 155 this Am and Na:154 with afternoon BMP. Pt BMP and Osm repeated Q6 with mannitol only given is Osm <320. Repeat CTH today. Pt E.coli ESBL +, 100.9 fever today ertapenem started yesterday x 5 days. Palliative care consulted, GOC discussion being had, at this time, family needs more time before any making any decisions.     Stroke etiology likely embolic source (cardioembolic vs hypercoagulable state), fetal PCA noted on CTA possibly explaining multiple territory CVA    Recommendations   Cytotoxic swelling management  - okay to check BMP and osmolality BID with goal Na 145-155  - no role for additional mannitol at this time  - continue to wean off hypertonic saline  - NRSGY following for enmanuel-crani watch, f/u recs    CVA ( L MCA and L PCA infarct)  - please continue start ASA 81mg  - continue Atorvastatin 80mg PO daily  - recommend SBP goal <180  - recommend q1hr coma checks and vitals   - provide stroke education  - obtain collateral from [facility]     Seizure-like activity   - vEEG negative for seizures; ok to discontinue Keppra    Palliative care following, family meeting today at 15:00.     Discussed with Neurology Attending Dr. Mustafa

## 2023-09-22 NOTE — PROGRESS NOTE ADULT - ASSESSMENT
89yo female with PMHx of HTN, HLD, T2DM presents to [facility] as a tx from [facility] i/s/o concern for large L sided MCA stroke. ICU consulted for further management.     Neuro  #MCA Stroke  #MCA Thrombus  Pt intubated @ [facility] i/s/o airway protection for suspected large L MCA stroke. CT brain stroke protocol showing opacified b/l MCAs L>R, cannot exclude thrombus. Per Neurosurgical team, CT brain perfusion showing infarction around MCA territory which is possibly too substantial to allow for adequate brain recovery even if intervention is done on thrombus.  Per Neurosurg, no plan for intervention on thrombus and MCA stroke expected to enlarge with larger infarction to be expected with possible cerebral edema however no edema witnessed on scans at this time.     - Patient weaned off sedation  - strict BP control w/ SBP <180  - Na goal 145-155 i/s/o stroke -- Na of 155 today   - No ASA per neurosurgery  - Palliative consulted i/s/o possible worsening infarction with no intervention -- family support needed   - Non con Head CT performed 09/20 with slight improvement. No hemorrhagic transformation  - Expedite vEEG for today  - Start aspirin  - Follow neuro recs: Serum osm 340, mannitol discontinued.   - Discontinue D5 fluids- sodium within goal         Cards  #Afib  New onset Afib with HR 120s. Patient bradycardiac over last few days  - Hold off on medications but can give diltiazem 5mg if pt becomes uncontrolled    #Hx of HTN  #BP Control  Will exhibit strict BP control i/s/o stroke. BPs in ED labile ranging from -190s.     Respiratory  #AHRF i/s/o MCA Stroke w/ Asp Pna   CT Chest showing signs of possible aspiration pna i/s/o intubation. WBC 12 however likely reactive i/s/o stroke. Afebrile.   - aspiration precautions   - Continue with Ertapenam 1000mg IVP for 4 days (Day 2)    Renal  #Permissive Hypernatremia i/s/o MCA Infarct   Na 136 on admission. Per protocol, NaCl 3% 100cc bolus.    Na -- goal 145-155    #Indwelling mathew  - monitor urine output -- placed in MICU    Heme  #Elevated Hgb  HGB 15 on admission likely i/s/o hemoconcentration.   - f/u repeat on AM labs    Endo  #Hx of T2DM  Hx of T2DM. A1c and home medications unknown.  - f/u A1c in AM  - started Kaiden     ID  See pulm    Prophylaxis:  - DVT: SCDs   - GI: none      89yo female with PMHx of HTN, HLD, T2DM presents to [facility] as a tx from [facility] i/s/o concern for large L sided MCA stroke. ICU consulted for further management.     Neuro  #MCA Stroke  #MCA Thrombus  Pt intubated @ [facility] i/s/o airway protection for suspected large L MCA stroke. CT brain stroke protocol showing opacified b/l MCAs L>R, cannot exclude thrombus. Per Neurosurgical team, CT brain perfusion showing infarction around MCA territory which is possibly too substantial to allow for adequate brain recovery even if intervention is done on thrombus.  Per Neurosurg, no plan for intervention on thrombus and MCA stroke expected to enlarge with larger infarction to be expected with possible cerebral edema however no edema witnessed on scans at this time.     - Patient weaned off sedation  - strict BP control w/ SBP <180  - Na goal 145-155 i/s/o stroke -- Na of 155 today   - No ASA per neurosurgery  - Palliative consulted i/s/o possible worsening infarction with no intervention -- family support needed- plan for family meeting at 3:00pm today    - vEEG with no seizure activity. Keppra discontinued as per neuro recs  - Continue aspirin    Cards  #Afib  New onset Afib with HR 120s. Patient bradycardiac over last few days  - Start lopressor 12.5 mg PO through NG tube BID    #Hx of HTN  #BP Control  Will exhibit strict BP control i/s/o stroke. BPs in ED labile ranging from -190s.     Respiratory  #Acute Hypoxic Respiratory Failure i/s/o MCA Stroke w/ Asp Pna   CT Chest showing signs of possible aspiration pna i/s/o intubation. WBC 12 however likely reactive i/s/o stroke. Afebrile.   - aspiration precautions   - Continue with Ertapenam 1000mg IVP for 4 days (09/20-09/23)    Renal  #Permissive Hypernatremia i/s/o MCA Infarct   Na 136 on admission. Per protocol, NaCl 3% 100cc bolus.   -Na at goal 145-155    #Indwelling mathew  - monitor urine output -- placed in MICU    Heme  #Elevated Hgb  HGB 15 on admission likely i/s/o hemoconcentration.   - f/u repeat on AM labs    Endo  #Hx of T2DM  Hx of T2DM. A1c and home medications unknown.  - f/u A1c in AM  - started Kaiden     ID  See pulm    Prophylaxis:  - DVT: SCDs   - GI: none

## 2023-09-22 NOTE — PROGRESS NOTE ADULT - SUBJECTIVE AND OBJECTIVE BOX
INTERVAL HPI/OVERNIGHT EVENTS: Midnight Na and osmolarity not done. AM osm 343, started D5 drip @ 40 cc/hr. This morning, patient in a fib with RVR. Previous history of episodes responsive to Ofirmev, given a dose this morning.     Subjective: Patient seen and examined at bedside. AAOx0.     ICU Vital Signs Last 24 Hrs  ICU Vital Signs Last 24 Hrs  T(C): 37.8 (21 Sep 2023 19:00), Max: 38 (21 Sep 2023 18:00)  T(F): 100 (21 Sep 2023 19:00), Max: 100.4 (21 Sep 2023 18:00)  HR: 66 (22 Sep 2023 06:00) (64 - 106)  BP: 129/68 (22 Sep 2023 06:00) (108/60 - 161/79)  BP(mean): 92 (22 Sep 2023 06:00) (78 - 113)  ABP: --  ABP(mean): --  RR: 20 (22 Sep 2023 06:00) (14 - 37)  SpO2: 100% (22 Sep 2023 06:00) (99% - 100%)    O2 Parameters below as of 22 Sep 2023 05:18  Patient On (Oxygen Delivery Method): ventilator    O2 Concentration (%): 30    Mode: AC/ CMV (Assist Control/ Continuous Mandatory Ventilation)  RR (machine): 12  TV (machine): 300  FiO2: 30  PEEP: 5  ITime: 1  MAP: 6  PIP: 17    I&O's Detail    21 Sep 2023 07:01  -  22 Sep 2023 07:00  --------------------------------------------------------  IN:    dextrose 5%: 80 mL    IV PiggyBack: 150 mL    Jevity 1.2: 1008 mL  Total IN: 1238 mL    OUT:    Indwelling Catheter - Urethral (mL): 600 mL  Total OUT: 600 mL    Total NET: 638 mL    PHYSICAL EXAM:  General: thin; frail  HEENT: NC/AT; PERRL  Neck: intubated   Cardiovascular: Irregular rate w/ normal rhythym, +S1/S2; NO M/R/G  Respiratory: CTA B/L; no W/R/R  Gastrointestinal: soft, NT/ND; +BSx4  Extremities: WWP; no edema or cyanosis; LUE cooler than rest, palpable radial and dp pulses   Vascular: 2+ radial, DP/PT pulses B/L  Neurological: AAOx0; intubated. Pt occasionally opens eyes and tracks. Withdraws to painful stimuli in 4/4 extremities     I&O's Summary    LABS:    .  LABS:                         12.3   9.99  )-----------( 148      ( 22 Sep 2023 04:49 )             39.4     09-22    157<H>  |  119<H>  |  42<H>  ----------------------------<  289<H>  4.5   |  30  |  0.73    Ca    10.5      22 Sep 2023 04:50  Phos  4.8     09-22  Mg     2.6     09-22    TPro  6.3  /  Alb  3.4  /  TBili  0.4  /  DBili  x   /  AST  25  /  ALT  10  /  AlkPhos  61  09-22      Urinalysis Basic - ( 22 Sep 2023 04:50 )    Color: x / Appearance: x / SG: x / pH: x  Gluc: 289 mg/dL / Ketone: x  / Bili: x / Urobili: x   Blood: x / Protein: x / Nitrite: x   Leuk Esterase: x / RBC: x / WBC x   Sq Epi: x / Non Sq Epi: x / Bacteria: x    RADIOLOGY, EKG & ADDITIONAL TESTS: Reviewed.                                Consultant(s) Notes Reviewed:  [x ] YES  [ ] NO    MEDICATIONS  (STANDING):  atorvastatin 80 milliGRAM(s) Oral at bedtime  chlorhexidine 0.12% Liquid 15 milliLiter(s) Oral Mucosa every 12 hours  chlorhexidine 2% Cloths 1 Application(s) Topical <User Schedule>  dextrose 5%. 1000 milliLiter(s) (50 mL/Hr) IV Continuous <Continuous>  dextrose 5%. 1000 milliLiter(s) (100 mL/Hr) IV Continuous <Continuous>  dextrose 50% Injectable 25 Gram(s) IV Push once  dextrose 50% Injectable 25 Gram(s) IV Push once  dextrose 50% Injectable 12.5 Gram(s) IV Push once  glucagon  Injectable 1 milliGRAM(s) IntraMuscular once  insulin lispro (ADMELOG) corrective regimen sliding scale   SubCutaneous every 6 hours  levETIRAcetam  IVPB 500 milliGRAM(s) IV Intermittent every 12 hours    MEDICATIONS  (PRN):  dextrose Oral Gel 15 Gram(s) Oral once PRN Blood Glucose LESS THAN 70 milliGRAM(s)/deciliter      Care Discussed with Consultants/Other Providers [ x] YES  [ ] NO    RADIOLOGY & ADDITIONAL TESTS: INTERVAL HPI/OVERNIGHT EVENTS: Midnight Na and osmolarity not done. AM osm 343, started D5 drip @ 40 cc/hr. This morning, patient in a fib with RVR. Previous history of episodes responsive to Ofirmev, given a dose this morning.     Subjective: Patient seen and examined at bedside. AAOx0.     ICU Vital Signs Last 24 Hrs  ICU Vital Signs Last 24 Hrs  T(C): 37.8 (21 Sep 2023 19:00), Max: 38 (21 Sep 2023 18:00)  T(F): 100 (21 Sep 2023 19:00), Max: 100.4 (21 Sep 2023 18:00)  HR: 66 (22 Sep 2023 06:00) (64 - 106)  BP: 129/68 (22 Sep 2023 06:00) (108/60 - 161/79)  BP(mean): 92 (22 Sep 2023 06:00) (78 - 113)  ABP: --  ABP(mean): --  RR: 20 (22 Sep 2023 06:00) (14 - 37)  SpO2: 100% (22 Sep 2023 06:00) (99% - 100%)    O2 Parameters below as of 22 Sep 2023 05:18   Patient On (Oxygen Delivery Method): ventilator    O2 Concentration (%): 30    Mode: AC/ CMV (Assist Control/ Continuous Mandatory Ventilation)  RR (machine): 12  TV (machine): 300  FiO2: 30  PEEP: 5  ITime: 1  MAP: 6  PIP: 17    I&O's Detail    21 Sep 2023 07:01  -  22 Sep 2023 07:00  --------------------------------------------------------  IN:    dextrose 5%: 80 mL    IV PiggyBack: 150 mL    Jevity 1.2: 1008 mL  Total IN: 1238 mL    OUT:    Indwelling Catheter - Urethral (mL): 600 mL  Total OUT: 600 mL    Total NET: 638 mL    PHYSICAL EXAM:  General: thin; frail  HEENT: NC/AT; PERRL  Neck: intubated   Cardiovascular: Irregular rate w/ normal rhythym, +S1/S2; NO M/R/G  Respiratory: CTA B/L; no W/R/R  Gastrointestinal: soft, NT/ND; +BSx4  Extremities: WWP; no edema or cyanosis; LUE cooler than rest, palpable radial and dp pulses   Vascular: 2+ radial, DP/PT pulses B/L  Neurological: AAOx0; intubated. Pt occasionally opens eyes and tracks. Withdraws to painful stimuli in 4/4 extremities     I&O's Summary    LABS:    .  LABS:                         12.3   9.99  )-----------( 148      ( 22 Sep 2023 04:49 )             39.4     09-22    157<H>  |  119<H>  |  42<H>  ----------------------------<  289<H>  4.5   |  30  |  0.73    Ca    10.5      22 Sep 2023 04:50  Phos  4.8     09-22  Mg     2.6     09-22    TPro  6.3  /  Alb  3.4  /  TBili  0.4  /  DBili  x   /  AST  25  /  ALT  10  /  AlkPhos  61  09-22      Urinalysis Basic - ( 22 Sep 2023 04:50 )    Color: x / Appearance: x / SG: x / pH: x  Gluc: 289 mg/dL / Ketone: x  / Bili: x / Urobili: x   Blood: x / Protein: x / Nitrite: x   Leuk Esterase: x / RBC: x / WBC x   Sq Epi: x / Non Sq Epi: x / Bacteria: x    RADIOLOGY, EKG & ADDITIONAL TESTS: Reviewed.                                Consultant(s) Notes Reviewed:  [x ] YES  [ ] NO    MEDICATIONS  (STANDING):  atorvastatin 80 milliGRAM(s) Oral at bedtime  chlorhexidine 0.12% Liquid 15 milliLiter(s) Oral Mucosa every 12 hours  chlorhexidine 2% Cloths 1 Application(s) Topical <User Schedule>  dextrose 5%. 1000 milliLiter(s) (50 mL/Hr) IV Continuous <Continuous>  dextrose 5%. 1000 milliLiter(s) (100 mL/Hr) IV Continuous <Continuous>  dextrose 50% Injectable 25 Gram(s) IV Push once  dextrose 50% Injectable 25 Gram(s) IV Push once  dextrose 50% Injectable 12.5 Gram(s) IV Push once  glucagon  Injectable 1 milliGRAM(s) IntraMuscular once  insulin lispro (ADMELOG) corrective regimen sliding scale   SubCutaneous every 6 hours  levETIRAcetam  IVPB 500 milliGRAM(s) IV Intermittent every 12 hours    MEDICATIONS  (PRN):  dextrose Oral Gel 15 Gram(s) Oral once PRN Blood Glucose LESS THAN 70 milliGRAM(s)/deciliter      Care Discussed with Consultants/Other Providers [ x] YES  [ ] NO    RADIOLOGY & ADDITIONAL TESTS: INTERVAL HPI/OVERNIGHT EVENTS: Midnight Na and osmolarity not done. AM osm 343, started D5 drip @ 40 cc/hr. This morning, patient in a fib with RVR. Previous history of episodes were related to fever responsive to Ofirmev, given a dose this morning. Also given IV Lopressor 5mg     Subjective: Patient seen and examined at bedside. AAOx0.     ICU Vital Signs Last 24 Hrs  ICU Vital Signs Last 24 Hrs  T(C): 38 (22 Sep 2023 14:17), Max: 38.4 (22 Sep 2023 13:00)  T(F): 100.4 (22 Sep 2023 14:17), Max: 101.2 (22 Sep 2023 13:00)  HR: 122 (22 Sep 2023 14:00) (64 - 146)  BP: 108/72 (22 Sep 2023 14:00) (105/65 - 161/79)  BP(mean): 85 (22 Sep 2023 14:00) (78 - 113)  ABP: --  ABP(mean): --  RR: 24 (22 Sep 2023 14:00) (14 - 37)  SpO2: 100% (22 Sep 2023 14:00) (98% - 100%)    O2 Parameters below as of 22 Sep 2023 14:00  Patient On (Oxygen Delivery Method): ventilator    O2 Concentration (%): 30    Mode: AC/ CMV (Assist Control/ Continuous Mandatory Ventilation)  RR (machine): 12  TV (machine): 300  FiO2: 30  PEEP: 5  ITime: 1  MAP: 6  PIP: 17    I&O's Detail    21 Sep 2023 07:01  -  22 Sep 2023 07:00  --------------------------------------------------------  IN:    dextrose 5%: 80 mL    IV PiggyBack: 150 mL    Jevity 1.2: 1008 mL  Total IN: 1238 mL    OUT:    Indwelling Catheter - Urethral (mL): 600 mL  Total OUT: 600 mL    Total NET: 638 mL    PHYSICAL EXAM:  General: thin; frail  HEENT: NC/AT; PERRL  Neck: intubated   Cardiovascular: Irregular rate w/ normal rhythym, +S1/S2; NO M/R/G  Respiratory: CTA B/L; no W/R/R  Gastrointestinal: soft, NT/ND; +BSx4  Extremities: WWP; no edema or cyanosis; LUE cooler than rest, palpable radial and dp pulses   Vascular: 2+ radial, DP/PT pulses B/L  Neurological: AAOx0; intubated. Pt occasionally opens eyes and tracks. Withdraws to painful stimuli in 4/4 extremities     I&O's Summary    LABS:    .  LABS:                         12.3   9.99  )-----------( 148      ( 22 Sep 2023 04:49 )             39.4     09-22    157<H>  |  119<H>  |  42<H>  ----------------------------<  289<H>  4.5   |  30  |  0.73    Ca    10.5      22 Sep 2023 04:50  Phos  4.8     09-22  Mg     2.6     09-22    TPro  6.3  /  Alb  3.4  /  TBili  0.4  /  DBili  x   /  AST  25  /  ALT  10  /  AlkPhos  61  09-22      Urinalysis Basic - ( 22 Sep 2023 04:50 )    Color: x / Appearance: x / SG: x / pH: x  Gluc: 289 mg/dL / Ketone: x  / Bili: x / Urobili: x   Blood: x / Protein: x / Nitrite: x   Leuk Esterase: x / RBC: x / WBC x   Sq Epi: x / Non Sq Epi: x / Bacteria: x    RADIOLOGY, EKG & ADDITIONAL TESTS: Reviewed.                                Consultant(s) Notes Reviewed:  [x ] YES  [ ] NO    MEDICATIONS  (STANDING):  atorvastatin 80 milliGRAM(s) Oral at bedtime  chlorhexidine 0.12% Liquid 15 milliLiter(s) Oral Mucosa every 12 hours  chlorhexidine 2% Cloths 1 Application(s) Topical <User Schedule>  dextrose 5%. 1000 milliLiter(s) (50 mL/Hr) IV Continuous <Continuous>  dextrose 5%. 1000 milliLiter(s) (100 mL/Hr) IV Continuous <Continuous>  dextrose 50% Injectable 25 Gram(s) IV Push once  dextrose 50% Injectable 25 Gram(s) IV Push once  dextrose 50% Injectable 12.5 Gram(s) IV Push once  glucagon  Injectable 1 milliGRAM(s) IntraMuscular once  insulin lispro (ADMELOG) corrective regimen sliding scale   SubCutaneous every 6 hours  levETIRAcetam  IVPB 500 milliGRAM(s) IV Intermittent every 12 hours    MEDICATIONS  (PRN):  dextrose Oral Gel 15 Gram(s) Oral once PRN Blood Glucose LESS THAN 70 milliGRAM(s)/deciliter      Care Discussed with Consultants/Other Providers [ x] YES  [ ] NO    RADIOLOGY & ADDITIONAL TESTS: INTERVAL HPI/OVERNIGHT EVENTS: Midnight Na and osmolarity not done. AM osm 343, started D5 drip @ 40 cc/hr. This morning, patient in a fib with RVR. Previous history of episodes were related to fever responsive to Ofirmev, given a dose this morning. Also given IV Lopressor 5mg     Subjective: Patient seen and examined at bedside. AAOx0.     ICU Vital Signs Last 24 Hrs  ICU Vital Signs Last 24 Hrs  T(C): 38 (22 Sep 2023 14:17), Max: 38.4 (22 Sep 2023 13:00)  T(F): 100.4 (22 Sep 2023 14:17), Max: 101.2 (22 Sep 2023 13:00)  HR: 122 (22 Sep 2023 14:00) (64 - 146)  BP: 108/72 (22 Sep 2023 14:00) (105/65 - 161/79)  BP(mean): 85 (22 Sep 2023 14:00) (78 - 113)  ABP: --  ABP(mean): --  RR: 24 (22 Sep 2023 14:00) (14 - 37)  SpO2: 100% (22 Sep 2023 14:00) (98% - 100%)    O2 Parameters below as of 22 Sep 2023 14:00  Patient On (Oxygen Delivery Method): ventilator    O2 Concentration (%): 30    Mode: AC/ CMV (Assist Control/ Continuous Mandatory Ventilation)  RR (machine): 12  TV (machine): 300  FiO2: 30  PEEP: 5  ITime: 1  MAP: 6  PIP: 17    I&O's Detail    21 Sep 2023 07:01  -  22 Sep 2023 07:00  --------------------------------------------------------  IN:    dextrose 5%: 80 mL    IV PiggyBack: 150 mL    Jevity 1.2: 1008 mL  Total IN: 1238 mL    OUT:    Indwelling Catheter - Urethral (mL): 600 mL  Total OUT: 600 mL    Total NET: 638 mL    PHYSICAL EXAM:  General: thin; frail  HEENT: NC/AT; PERRL  Neck: intubated   Cardiovascular: Irregular rate w/ normal rhythym, +S1/S2; NO M/R/G  Respiratory: CTA B/L; no W/R/R  Gastrointestinal: soft, NT/ND; +BSx4  Extremities: WWP; no edema or cyanosis; LUE cooler than rest, palpable radial and dp pulses   Vascular: 2+ radial, DP/PT pulses B/L  Neurological: AAOx0; intubated. Pt occasionally opens eyes and tracks. Withdraws to painful stimuli in 4/4 extremities     I&O's Summary    LABS:                          12.3   9.99  )-----------( 148      ( 22 Sep 2023 04:49 )             39.4     09-22    157<H>  |  119<H>  |  42<H>  ----------------------------<  289<H>  4.5   |  30  |  0.73    Ca    10.5      22 Sep 2023 04:50  Phos  4.8     09-22  Mg     2.6     09-22    TPro  6.3  /  Alb  3.4  /  TBili  0.4  /  DBili  x   /  AST  25  /  ALT  10  /  AlkPhos  61  09-22      Urinalysis Basic - ( 22 Sep 2023 04:50 )    Color: x / Appearance: x / SG: x / pH: x  Gluc: 289 mg/dL / Ketone: x  / Bili: x / Urobili: x   Blood: x / Protein: x / Nitrite: x   Leuk Esterase: x / RBC: x / WBC x   Sq Epi: x / Non Sq Epi: x / Bacteria: x    RADIOLOGY, EKG & ADDITIONAL TESTS: Reviewed.                                Consultant(s) Notes Reviewed:  [x ] YES  [ ] NO    MEDICATIONS  (STANDING):  atorvastatin 80 milliGRAM(s) Oral at bedtime  chlorhexidine 0.12% Liquid 15 milliLiter(s) Oral Mucosa every 12 hours  chlorhexidine 2% Cloths 1 Application(s) Topical <User Schedule>  dextrose 5%. 1000 milliLiter(s) (50 mL/Hr) IV Continuous <Continuous>  dextrose 5%. 1000 milliLiter(s) (100 mL/Hr) IV Continuous <Continuous>  dextrose 50% Injectable 25 Gram(s) IV Push once  dextrose 50% Injectable 25 Gram(s) IV Push once  dextrose 50% Injectable 12.5 Gram(s) IV Push once  glucagon  Injectable 1 milliGRAM(s) IntraMuscular once  insulin lispro (ADMELOG) corrective regimen sliding scale   SubCutaneous every 6 hours  levETIRAcetam  IVPB 500 milliGRAM(s) IV Intermittent every 12 hours    MEDICATIONS  (PRN):  dextrose Oral Gel 15 Gram(s) Oral once PRN Blood Glucose LESS THAN 70 milliGRAM(s)/deciliter      Care Discussed with Consultants/Other Providers [ x] YES  [ ] NO    RADIOLOGY & ADDITIONAL TESTS:

## 2023-09-23 NOTE — PROGRESS NOTE ADULT - ASSESSMENT
87yo Female with PMHX of HTN, HLD, T2DM, and TIA (2.5 weeks ago; pt with L sided weakness, was admitted to [place] and discharged on ASA without residual deficits) BIBEMS to [facility] ED as transfer from [facility] for L MCA stroke as possible thrombectomy candidate. Pt intubated at [place] and found to hyperdense L MCA on noncon CTH. NIHSS 32 on arrival. CTH with evidence of acute L MCA infarct, CTP with mismatch volume of 238cc with mismatch ratio 6.8 within L MCA territory, CTA H/N with left MCA bifurcation occlusion, free air in right carotid deep spaces likely due to traumatic intubation. Pt out of window for thrombolytic treatment. After discussion between Dr. Ontiveros and Dr. Plascencia, patient was deemed not a candidate for mechanical thrombectomy given ischemic changes along L MCA territory on CTH with large correlating core on CTP. Course complicated by new-onset afib with RVR throughout hospitalization, cytotoxic swelling and pneumonia. Last CTH on 9/20 showing improvement in mass effect and no hemorrhagic transformation. Pt on ASA, remains intubated in ICU with no sedation on EEG (no seizures and no seizure medication). Palliative care onboard, GOC discussion being had, however at this time, family needs more time before any making any decisions. Pt remains full code.    Stroke etiology likely embolic source (cardioembolic vs hypercoagulable state), fetal PCA noted on CTA possibly explaining multiple territory CVA    Recommendations:  1)Secondary stroke prevention  - Continue ASA 81mg and Atorvastatin 80mg daily   - holding a/c given high risk of hemorrhagic conversion    2) Stroke risk factors  - A1c: 6.4  - LDL: 72  - Afib with RVR with no on AC    3) Further management  - Continue to provider ongoing support to family during GOC discussions   - Continue prolonged vEEG; No seizure prophylaxis at this time  - Na goal WNL; daily BMP  - recommend SBP goal <180  - recommend q1hr coma checks and vitals   - provide stroke education      Discussed with Neurology Attending Dr. Mustafa    89yo Female with PMHX of HTN, HLD, T2DM, and TIA (2.5 weeks ago; pt with L sided weakness, was admitted to [place] and discharged on ASA without residual deficits) BIBEMS to [facility] ED as transfer from [facility] for L MCA stroke as possible thrombectomy candidate. Pt intubated at [place] and found to hyperdense L MCA on noncon CTH. NIHSS 32 on arrival. CTH with evidence of acute L MCA infarct, CTP with mismatch volume of 238cc with mismatch ratio 6.8 within L MCA territory, CTA H/N with left MCA bifurcation occlusion, free air in right carotid deep spaces likely due to traumatic intubation. Pt out of window for thrombolytic treatment. After discussion between Dr. Ontiveros and Dr. Plascencia, patient was deemed not a candidate for mechanical thrombectomy given ischemic changes along L MCA territory on CTH with large correlating core on CTP. Course complicated by new-onset afib with RVR throughout hospitalization, cytotoxic swelling and pneumonia. Last CTH on 9/20 showing improvement in mass effect and no hemorrhagic transformation. Pt on ASA, remains intubated in ICU with no sedation on EEG (no seizures and no seizure medication). Palliative care onboard, GOC discussion being had, however at this time, family needs more time before any making any decisions. Pt remains full code.    Stroke etiology likely embolic source (cardioembolic vs hypercoagulable state), fetal PCA noted on CTA possibly explaining multiple territory CVA    Recommendations:  1)Secondary stroke prevention  - Continue ASA 81mg and Atorvastatin 80mg daily   - holding a/c given high risk of hemorrhagic conversion    2) Stroke risk factors  - A1c: 6.4  - LDL: 72  - Afib with RVR with no on AC    3) Further management  - Continue to provide ongoing support to family during GOC discussions   - Continue prolonged vEEG; No seizure prophylaxis at this time  - Na goal WNL; daily BMP  - recommend SBP goal <180  - recommend q1hr coma checks and vitals   - provide stroke education      Discussed with Neurology Attending Dr. Mustafa

## 2023-09-23 NOTE — EEG REPORT - NS EEG TEXT BOX
Daily Updates (from 07:00 am until 07:00 am):  Day 2  Sep 22-23, 2023:   Background:  continuous, with predominantly moderate to high amplitude alpha/theta frequencies over the right hemisphere and theta-alpha frequencies over the left hemisphere.  Symmetry:  relative attenuation of the left hemisphere, particularly in the parasaggital regions..   Posterior Dominant Rhythm:  10 Hz, becoming well-regulated over the right by end of study, 9-10 Hz on the Left and poorly regulated.  Organization: Appropriate anterior-posterior gradient.  Voltage:  Normal (20+ uV)  Variability: Yes. 		Reactivity: Yes.  N2 sleep: Symmetric, synchronous spindles and K complexes.  Spontaneous Activity:  No epileptiform discharges.    Frequent sharply contoured semi-rhythmic theta over the right hemisphere, but not appearing epilpetiform.  Periodic/rhythmic activity:  None  Events:  No electrographic seizures or significant clinical events.  Provocations:  Hyperventilation and Photic stimulation: was not performed.    Daily Summary:    Continued focal slowing and relative attenuation over the left hemisphere, suggestive of focal cortical and subcortical dysfunction.   Relative attenuation can also be due to absorption of electrical activity, such as due to extra-cerebral focal fluid collections.  No definite epileptiform discharges.      Dev Gutierrez MD  Attending Neurologist, [facility]

## 2023-09-23 NOTE — PROGRESS NOTE ADULT - ASSESSMENT
88F with PMHx of HTN, HLD, T2DM presents to [facility] as a tx from [facility] i/s/o concern for large L sided MCA stroke. ICU consulted for further management.     Neuro  #MCA Stroke  #MCA Thrombus  Pt intubated @ [facility] i/s/o airway protection for suspected large L MCA stroke. CT brain stroke protocol showing opacified b/l MCAs L>R, cannot exclude thrombus. Per Neurosurgical team, CT brain perfusion showing infarction around MCA territory which is possibly too substantial to allow for adequate brain recovery even if intervention is done on thrombus.  Per Neurosurg, no plan for intervention on thrombus and MCA stroke expected to enlarge with larger infarction to be expected with possible cerebral edema however no edema witnessed on scans at this time.     - Patient weaned off sedation  - strict BP control w/ SBP <180  - Na goal 145-155 i/s/o stroke -- Na of 155 today   - No ASA per neurosurgery  - Palliative consulted i/s/o possible worsening infarction with no intervention -- family support needed- plan for family meeting at 3:00pm today    - vEEG with no seizure activity. Keppra discontinued as per neuro recs  - Continue aspirin    Cards  #Afib  New onset Afib with HR 120s. Patient bradycardiac over last few days  - Start lopressor 12.5 mg PO through NG tube BID    #Hx of HTN  #BP Control  Will exhibit strict BP control i/s/o stroke. BPs in ED labile ranging from -190s.   - c/w lopressor    Respiratory  #Acute Hypoxic Respiratory Failure i/s/o MCA Stroke w/ Asp Pna   CT Chest showing signs of possible aspiration pna i/s/o intubation. WBC 12 however likely reactive i/s/o stroke. Afebrile.   - aspiration precautions   - Last day of Ertapenam 1000mg IVP for 4 days (09/20-09/23)    Renal  #Permissive Hypernatremia i/s/o MCA Infarct   Na 136 on admission. Per protocol, NaCl 3% 100cc bolus.   -Na at goal 145-155    #Indwelling mathew  - monitor urine output -- placed in MICU    Heme  #Elevated Hgb  HGB 15 on admission likely i/s/o hemoconcentration.   - ctm  - trend CBC    Endo  #Hx of T2DM  Hx of T2DM. A1c and home medications unknown.  - f/u A1c in AM --> 6.4  - started [place]     ID  See pulm    Prophylaxis:  - DVT: SCDs   - GI: none

## 2023-09-23 NOTE — PROGRESS NOTE ADULT - SUBJECTIVE AND OBJECTIVE BOX
Patient is a 88y old  Female who presents with a chief complaint of stroke (23 Sep 2023 13:53)    INTERVAL HPI/OVERNIGHT EVENTS:   s/p Family meeting. Decision making still in progress. At bedside pt is unresponsive, no laboured breathing, appears unchanged from previous. Babinski appears upgoing however not prominent.     ICU Vital Signs Last 24 Hrs  T(C): 37.5 (23 Sep 2023 18:00), Max: 37.8 (23 Sep 2023 01:02)  T(F): 99.5 (23 Sep 2023 18:00), Max: 100 (23 Sep 2023 01:02)  HR: 66 (23 Sep 2023 18:11) (66 - 121)  BP: 112/67 (23 Sep 2023 18:00) (95/59 - 131/86)  BP(mean): 85 (23 Sep 2023 18:00) (73 - 102)    RR: 17 (23 Sep 2023 18:00) (13 - 30)  SpO2: 100% (23 Sep 2023 18:11) (100% - 100%)    O2 Parameters below as of 23 Sep 2023 18:00  Patient On (Oxygen Delivery Method): ventilator  O2 Concentration (%): 30      I&O's Summary  22 Sep 2023 07:01  -  23 Sep 2023 07:00  --------------------------------------------------------  IN: 2686 mL / OUT: 1349 mL / NET: 1337 mL    23 Sep 2023 07:01  -  23 Sep 2023 19:31  --------------------------------------------------------  IN: 1053 mL / OUT: 375 mL / NET: 678 mL      Mode: AC/ CMV (Assist Control/ Continuous Mandatory Ventilation)  RR (machine): 12  TV (machine): 300  FiO2: 30  PEEP: 5  ITime: 1  MAP: 6.5  PIP: 10      LABS:                        12.6   10.24 )-----------( 146      ( 23 Sep 2023 07:12 )             39.7     09-23    142  |  106  |  38<H>  ----------------------------<  266<H>  4.4   |  30  |  0.62    Ca    9.1      23 Sep 2023 18:00  Phos  4.3     09-23  Mg     2.4     09-23    TPro  6.3  /  Alb  3.3  /  TBili  0.4  /  DBili  x   /  AST  31  /  ALT  12  /  AlkPhos  67  09-23      Urinalysis Basic - ( 23 Sep 2023 18:00 )    Color: x / Appearance: x / SG: x / pH: x  Gluc: 266 mg/dL / Ketone: x  / Bili: x / Urobili: x   Blood: x / Protein: x / Nitrite: x   Leuk Esterase: x / RBC: x / WBC x   Sq Epi: x / Non Sq Epi: x / Bacteria: x      CAPILLARY BLOOD GLUCOSE  POCT Blood Glucose.: 247 mg/dL (23 Sep 2023 18:00)  POCT Blood Glucose.: 191 mg/dL (23 Sep 2023 11:56)  POCT Blood Glucose.: 252 mg/dL (23 Sep 2023 06:01)  POCT Blood Glucose.: 252 mg/dL (23 Sep 2023 00:03)        RADIOLOGY & ADDITIONAL TESTS:    Consultant(s) Notes Reviewed:  [x ] YES  [ ] NO    MEDICATIONS  (STANDING):  aspirin  chewable 81 milliGRAM(s) Oral every 24 hours  atorvastatin 80 milliGRAM(s) Oral at bedtime  chlorhexidine 0.12% Liquid 15 milliLiter(s) Oral Mucosa every 12 hours  chlorhexidine 2% Cloths 1 Application(s) Topical <User Schedule>  dextrose 5%. 1000 milliLiter(s) (100 mL/Hr) IV Continuous <Continuous>  dextrose 5%. 1000 milliLiter(s) (50 mL/Hr) IV Continuous <Continuous>  dextrose 50% Injectable 25 Gram(s) IV Push once  dextrose 50% Injectable 25 Gram(s) IV Push once  dextrose 50% Injectable 12.5 Gram(s) IV Push once  ertapenem  IVPB 1000 milliGRAM(s) IV Intermittent every 24 hours  glucagon  Injectable 1 milliGRAM(s) IntraMuscular once  insulin lispro (ADMELOG) corrective regimen sliding scale   SubCutaneous every 6 hours  metoprolol tartrate 12.5 milliGRAM(s) Oral two times a day    MEDICATIONS  (PRN):  dextrose Oral Gel 15 Gram(s) Oral once PRN Blood Glucose LESS THAN 70 milliGRAM(s)/deciliter      PHYSICAL EXAM:  General: thin; frail  HEENT: NC/AT; PERRL  Neck: intubated   Cardiovascular: Irregular rate w/ occasional rapid rhythm +S1/S2; NO M/R/G  Respiratory: CTA B/L; no W/R/R  Gastrointestinal: soft, NT/ND; +BSx4  Extremities: WWP; no edema or cyanosis; LUE cooler than rest, palpable radial and dp pulses   Vascular: 2+ radial, DP/PT pulses B/L  Neurological: AAOx0; intubated. Slight Upgoing Babinski noted    Care Discussed with Consultants/Other Providers [ x] YES  [ ] NO

## 2023-09-23 NOTE — PROGRESS NOTE ADULT - SUBJECTIVE AND OBJECTIVE BOX
Neurology Stroke Progress Note    INTERVAL HPI/OVERNIGHT EVENTS:  Patient seen and examined. No acute events overnight. Pt does not appear in acute distress, intubated with EEG on.    MEDICATIONS  (STANDING):  aspirin  chewable 81 milliGRAM(s) Oral every 24 hours  atorvastatin 80 milliGRAM(s) Oral at bedtime  chlorhexidine 0.12% Liquid 15 milliLiter(s) Oral Mucosa every 12 hours  chlorhexidine 2% Cloths 1 Application(s) Topical <User Schedule>  dextrose 5%. 1000 milliLiter(s) (100 mL/Hr) IV Continuous <Continuous>  dextrose 5%. 1000 milliLiter(s) (50 mL/Hr) IV Continuous <Continuous>  dextrose 50% Injectable 25 Gram(s) IV Push once  dextrose 50% Injectable 25 Gram(s) IV Push once  dextrose 50% Injectable 12.5 Gram(s) IV Push once  ertapenem  IVPB 1000 milliGRAM(s) IV Intermittent every 24 hours  glucagon  Injectable 1 milliGRAM(s) IntraMuscular once  insulin lispro (ADMELOG) corrective regimen sliding scale   SubCutaneous every 6 hours  metoprolol tartrate 12.5 milliGRAM(s) Oral two times a day    MEDICATIONS  (PRN):  dextrose Oral Gel 15 Gram(s) Oral once PRN Blood Glucose LESS THAN 70 milliGRAM(s)/deciliter    Allergies  No Known Allergies  Intolerances      Vital Signs Last 24 Hrs  T(C): 37.8 (23 Sep 2023 13:00), Max: 38 (22 Sep 2023 14:17)  T(F): 100 (23 Sep 2023 13:00), Max: 100.4 (22 Sep 2023 14:17)  HR: 102 (23 Sep 2023 13:00) (82 - 122)  BP: 109/77 (23 Sep 2023 13:00) (95/64 - 131/86)  BP(mean): 89 (23 Sep 2023 13:00) (75 - 102)  RR: 19 (23 Sep 2023 13:00) (13 - 30)  SpO2: 100% (23 Sep 2023 13:00) (100% - 100%)    Parameters below as of 23 Sep 2023 13:00  Patient On (Oxygen Delivery Method): ventilator, CPAP    O2 Concentration (%): 30    Physical exam: Intubated   General: lying in bed with eye closed, very slightly opens eye after noxious, intubated (tired to grab ET tube with LUE today)  Eyes: Anicteric sclerae  Neck: trachea midline; ET tube in place  Cardiovascular: Afib on monitor  Pulmonary: No use of accessory muscles  Extremities: no edema    Neurologic:  -Mental status: AAOx0; intubated; very slightly opens eye after noxious; does not follow commands  -Cranial nerves:   II: Does not BTT bilaterally; brisk corneal reflex of left eye, more sluggish during testing of right eye   III, IV, VI: Pupils equally round and reactive to light; appears to have a left gaze preference  V:  Unable to test facial sensation  VII:  Right facial droop   VIII: Unable to test hearing  Motor: Withdraws to noxious on LUE/LLE, 2/5; RUE with movement seen in hands and finger tips: 0/5; RLE triple flexion     Sensation: localizes noxious stimulus with sternal rub with LUE; withdraws to pain in LUE/LLE but not RUE/RLE.   Coordination: Unable to test    LABS:                        12.6   10.24 )-----------( 146      ( 23 Sep 2023 07:12 )             39.7     09-23    145  |  108  |  34<H>  ----------------------------<  259<H>  4.5   |  30  |  0.63    Ca    9.6      23 Sep 2023 07:12  Phos  4.3     09-23  Mg     2.4     09-23    TPro  6.3  /  Alb  3.3  /  TBili  0.4  /  DBili  x   /  AST  31  /  ALT  12  /  AlkPhos  67  09-23      Urinalysis Basic - ( 23 Sep 2023 07:12 )    Color: x / Appearance: x / SG: x / pH: x  Gluc: 259 mg/dL / Ketone: x  / Bili: x / Urobili: x   Blood: x / Protein: x / Nitrite: x   Leuk Esterase: x / RBC: x / WBC x   Sq Epi: x / Non Sq Epi: x / Bacteria: x        RADIOLOGY & ADDITIONAL TESTS:    CT Head No Cont (09.20.23 @ 18:33) >  IMPRESSION:  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 No hemorrhagic transformation.

## 2023-09-24 NOTE — PROGRESS NOTE ADULT - SUBJECTIVE AND OBJECTIVE BOX
Patient is a 88y old  Female who presents with a chief complaint of stroke (23 Sep 2023 13:53)    INTERVAL HPI/OVERNIGHT EVENTS:   s/p Family meeting. Decision making still in progress. At bedside pt is unresponsive, no laboured breathing, appears unchanged from previous. Babinski appears upgoing however not prominent.     ICU Vital Signs Last 24 Hrs  T(C): 37.5 (23 Sep 2023 18:00), Max: 37.8 (23 Sep 2023 01:02)  T(F): 99.5 (23 Sep 2023 18:00), Max: 100 (23 Sep 2023 01:02)  HR: 66 (23 Sep 2023 18:11) (66 - 121)  BP: 112/67 (23 Sep 2023 18:00) (95/59 - 131/86)  BP(mean): 85 (23 Sep 2023 18:00) (73 - 102)    RR: 17 (23 Sep 2023 18:00) (13 - 30)  SpO2: 100% (23 Sep 2023 18:11) (100% - 100%)    O2 Parameters below as of 23 Sep 2023 18:00  Patient On (Oxygen Delivery Method): ventilator  O2 Concentration (%): 30      I&O's Summary  22 Sep 2023 07:01  -  23 Sep 2023 07:00  --------------------------------------------------------  IN: 2686 mL / OUT: 1349 mL / NET: 1337 mL    23 Sep 2023 07:01  -  23 Sep 2023 19:31  --------------------------------------------------------  IN: 1053 mL / OUT: 375 mL / NET: 678 mL      Mode: AC/ CMV (Assist Control/ Continuous Mandatory Ventilation)  RR (machine): 12  TV (machine): 300  FiO2: 30  PEEP: 5  ITime: 1  MAP: 6.5  PIP: 10      LABS:                        12.6   10.24 )-----------( 146      ( 23 Sep 2023 07:12 )             39.7     09-23    142  |  106  |  38<H>  ----------------------------<  266<H>  4.4   |  30  |  0.62    Ca    9.1      23 Sep 2023 18:00  Phos  4.3     09-23  Mg     2.4     09-23    TPro  6.3  /  Alb  3.3  /  TBili  0.4  /  DBili  x   /  AST  31  /  ALT  12  /  AlkPhos  67  09-23      Urinalysis Basic - ( 23 Sep 2023 18:00 )    Color: x / Appearance: x / SG: x / pH: x  Gluc: 266 mg/dL / Ketone: x  / Bili: x / Urobili: x   Blood: x / Protein: x / Nitrite: x   Leuk Esterase: x / RBC: x / WBC x   Sq Epi: x / Non Sq Epi: x / Bacteria: x      CAPILLARY BLOOD GLUCOSE  POCT Blood Glucose.: 247 mg/dL (23 Sep 2023 18:00)  POCT Blood Glucose.: 191 mg/dL (23 Sep 2023 11:56)  POCT Blood Glucose.: 252 mg/dL (23 Sep 2023 06:01)  POCT Blood Glucose.: 252 mg/dL (23 Sep 2023 00:03)        RADIOLOGY & ADDITIONAL TESTS:    Consultant(s) Notes Reviewed:  [x ] YES  [ ] NO    MEDICATIONS  (STANDING):  aspirin  chewable 81 milliGRAM(s) Oral every 24 hours  atorvastatin 80 milliGRAM(s) Oral at bedtime  chlorhexidine 0.12% Liquid 15 milliLiter(s) Oral Mucosa every 12 hours  chlorhexidine 2% Cloths 1 Application(s) Topical <User Schedule>  dextrose 5%. 1000 milliLiter(s) (100 mL/Hr) IV Continuous <Continuous>  dextrose 5%. 1000 milliLiter(s) (50 mL/Hr) IV Continuous <Continuous>  dextrose 50% Injectable 25 Gram(s) IV Push once  dextrose 50% Injectable 25 Gram(s) IV Push once  dextrose 50% Injectable 12.5 Gram(s) IV Push once  ertapenem  IVPB 1000 milliGRAM(s) IV Intermittent every 24 hours  glucagon  Injectable 1 milliGRAM(s) IntraMuscular once  insulin lispro (ADMELOG) corrective regimen sliding scale   SubCutaneous every 6 hours  metoprolol tartrate 12.5 milliGRAM(s) Oral two times a day    MEDICATIONS  (PRN):  dextrose Oral Gel 15 Gram(s) Oral once PRN Blood Glucose LESS THAN 70 milliGRAM(s)/deciliter      PHYSICAL EXAM:  General: thin; frail  HEENT: NC/AT; PERRL  Neck: intubated   Cardiovascular: Irregular rate w/ occasional rapid rhythm +S1/S2; NO M/R/G  Respiratory: CTA B/L; no W/R/R  Gastrointestinal: soft, NT/ND; +BSx4  Extremities: WWP; no edema or cyanosis; LUE cooler than rest, palpable radial and dp pulses   Vascular: 2+ radial, DP/PT pulses B/L  Neurological: AAOx0; intubated. Slight Upgoing Babinski noted    Care Discussed with Consultants/Other Providers [ x] YES  [ ] NO Patient is a 88y old  Female who presents with a chief complaint of stroke (23 Sep 2023 13:53)    INTERVAL HPI/OVERNIGHT EVENTS: Febrile to 100.6 (not a Tmax, not cultured). Restraints reordered i/s/o grabbing at ETT. BS of 323 cc, SC'd x1.     ICU Vital Signs Last 24 Hrs  ICU Vital Signs Last 24 Hrs  T(C): 37 (24 Sep 2023 18:13), Max: 37.6 (23 Sep 2023 22:12)  T(F): 98.6 (24 Sep 2023 18:13), Max: 99.7 (23 Sep 2023 22:12)  HR: 60 (24 Sep 2023 20:00) (55 - 74)  BP: 117/58 (24 Sep 2023 20:00) (102/55 - 124/68)  BP(mean): 82 (24 Sep 2023 20:00) (72 - 90)  ABP: --  ABP(mean): --  RR: 12 (24 Sep 2023 20:00) (12 - 20)  SpO2: 99% (24 Sep 2023 20:00) (99% - 100%)    O2 Parameters below as of 24 Sep 2023 20:00  Patient On (Oxygen Delivery Method): ventilator    O2 Concentration (%): 30        I&O's Summary  I&O's Summary    23 Sep 2023 07:01  -  24 Sep 2023 07:00  --------------------------------------------------------  IN: 2113 mL / OUT: 875 mL / NET: 1238 mL    24 Sep 2023 07:01  -  24 Sep 2023 21:02  --------------------------------------------------------  IN: 1170 mL / OUT: 600 mL / NET: 570 mL      Mode: AC/ CMV (Assist Control/ Continuous Mandatory Ventilation)  RR (machine): 12  TV (machine): 300  FiO2: 30  PEEP: 5  ITime: 1  MAP: 6.5  PIP: 10      LABS:             .  LABS:                         11.4   10.78 )-----------( 139      ( 24 Sep 2023 05:40 )             35.3     09-24    140  |  103  |  39<H>  ----------------------------<  239<H>  4.5   |  29  |  0.61    Ca    9.3      24 Sep 2023 05:40  Phos  4.3     09-24  Mg     2.5     09-24    TPro  5.8<L>  /  Alb  2.8<L>  /  TBili  0.5  /  DBili  x   /  AST  34  /  ALT  11  /  AlkPhos  60  09-24      Urinalysis Basic - ( 24 Sep 2023 05:40 )    Color: x / Appearance: x / SG: x / pH: x  Gluc: 239 mg/dL / Ketone: x  / Bili: x / Urobili: x   Blood: x / Protein: x / Nitrite: x   Leuk Esterase: x / RBC: x / WBC x   Sq Epi: x / Non Sq Epi: x / Bacteria: x    RADIOLOGY & ADDITIONAL TESTS:    Consultant(s) Notes Reviewed:  [x ] YES  [ ] NO    MEDICATIONS  (STANDING):  aspirin  chewable 81 milliGRAM(s) Oral every 24 hours  atorvastatin 80 milliGRAM(s) Oral at bedtime  chlorhexidine 0.12% Liquid 15 milliLiter(s) Oral Mucosa every 12 hours  chlorhexidine 2% Cloths 1 Application(s) Topical <User Schedule>  dextrose 5%. 1000 milliLiter(s) (100 mL/Hr) IV Continuous <Continuous>  dextrose 5%. 1000 milliLiter(s) (50 mL/Hr) IV Continuous <Continuous>  dextrose 50% Injectable 25 Gram(s) IV Push once  dextrose 50% Injectable 25 Gram(s) IV Push once  dextrose 50% Injectable 12.5 Gram(s) IV Push once  ertapenem  IVPB 1000 milliGRAM(s) IV Intermittent every 24 hours  glucagon  Injectable 1 milliGRAM(s) IntraMuscular once  insulin lispro (ADMELOG) corrective regimen sliding scale   SubCutaneous every 6 hours  metoprolol tartrate 12.5 milliGRAM(s) Oral two times a day    MEDICATIONS  (PRN):  dextrose Oral Gel 15 Gram(s) Oral once PRN Blood Glucose LESS THAN 70 milliGRAM(s)/deciliter      PHYSICAL EXAM:  General: thin; frail  HEENT: NC/AT; PERRL  Neck: intubated   Cardiovascular: Irregular rate w/ occasional rapid rhythm +S1/S2; NO M/R/G  Respiratory: CTA B/L; no W/R/R  Gastrointestinal: soft, NT/ND; +BSx4  Extremities: WWP; no edema or cyanosis; LUE cooler than rest, palpable radial and dp pulses   Vascular: 2+ radial, DP/PT pulses B/L  Neurological: AAOx0; intubated. Slight Upgoing Babinski noted    Care Discussed with Consultants/Other Providers [ x] YES  [ ] NO

## 2023-09-24 NOTE — EEG REPORT - NS EEG TEXT BOX
[facility] Department of Neurology  Inpatient Continuous video-Electroencephalography Report    Acquisition Details:  Electroencephalography was acquired using a minimum of 21 channels on an [employer] Neurology system v 9.3.1 with electrode placement according to the standard International 10-20 system following ACNS (American Clinical Neurophysiology Society) guidelines for Long-Term Video EEG monitoring.  Anterior temporal T1 and T2 electrodes were utilized whenever possible.   The [employer] automated spike & seizure detections were all reviewed in detail, in addition to extensive portions of raw EEG.      Daily Updates (from 07:00 am until 07:00 am):  Day 3  Sep 23-24, 2023:   Background:  continuous, with predominantly moderate to high amplitude alpha/theta frequencies over the right hemisphere and theta-alpha frequencies over the left hemisphere.  Symmetry:  relative attenuation of the left hemisphere, particularly in the parasaggital regions.  Posterior Dominant Rhythm:  10 Hz, becoming well-regulated over the right by end of study, 9-10 Hz on the Left and poorly regulated.  Organization: Appropriate anterior-posterior gradient.  Voltage:  Normal (20+ uV)  Variability: Yes. 		Reactivity: Yes.  N2 sleep: Symmetric, synchronous spindles and K complexes.  Spontaneous Activity:  No epileptiform discharges.    Frequent sharply contoured semi-rhythmic theta over the right hemisphere, but not appearing epilpetiform.  Periodic/rhythmic activity:  None  Events:  No electrographic seizures or significant clinical events.  Provocations:  Hyperventilation and Photic stimulation: was not performed.    Daily Summary:    Continued focal slowing and relative attenuation over the left hemisphere, suggestive of focal cortical and subcortical dysfunction.   Relative attenuation can also be due to absorption of electrical activity, such as due to extra-cerebral focal fluid collections.  No definite epileptiform discharges.      Dev Gutierrez MD  Attending Neurologist, [facility]

## 2023-09-24 NOTE — PROGRESS NOTE ADULT - SUBJECTIVE AND OBJECTIVE BOX
Neurology Stroke Progress Note    INTERVAL HPI/OVERNIGHT EVENTS:  Patient seen and examined. No acute events overnight. Pt does not appear in acute distress, intubated on EEG in am, EEG removed this evening    MEDICATIONS  (STANDING):  artificial tears (preservative free) Ophthalmic Solution 1 Drop(s) Both EYES daily  aspirin  chewable 81 milliGRAM(s) Oral every 24 hours  atorvastatin 80 milliGRAM(s) Oral at bedtime  chlorhexidine 0.12% Liquid 15 milliLiter(s) Oral Mucosa every 12 hours  chlorhexidine 2% Cloths 1 Application(s) Topical <User Schedule>  dextrose 5%. 1000 milliLiter(s) (100 mL/Hr) IV Continuous <Continuous>  dextrose 5%. 1000 milliLiter(s) (50 mL/Hr) IV Continuous <Continuous>  dextrose 50% Injectable 25 Gram(s) IV Push once  dextrose 50% Injectable 25 Gram(s) IV Push once  dextrose 50% Injectable 12.5 Gram(s) IV Push once  glucagon  Injectable 1 milliGRAM(s) IntraMuscular once  insulin lispro (ADMELOG) corrective regimen sliding scale   SubCutaneous every 6 hours  metoprolol tartrate 12.5 milliGRAM(s) Oral two times a day    MEDICATIONS  (PRN):  dextrose Oral Gel 15 Gram(s) Oral once PRN Blood Glucose LESS THAN 70 milliGRAM(s)/deciliter      Allergies    No Known Allergies    Intolerances        Vital Signs Last 24 Hrs  T(C): 37 (24 Sep 2023 18:13), Max: 37.6 (23 Sep 2023 22:12)  T(F): 98.6 (24 Sep 2023 18:13), Max: 99.7 (23 Sep 2023 22:12)  HR: 54 (24 Sep 2023 21:00) (54 - 74)  BP: 92/54 (24 Sep 2023 21:00) (92/54 - 124/68)  BP(mean): 67 (24 Sep 2023 21:00) (67 - 90)  RR: 22 (24 Sep 2023 21:00) (12 - 22)  SpO2: 100% (24 Sep 2023 21:00) (99% - 100%)    Parameters below as of 24 Sep 2023 21:00  Patient On (Oxygen Delivery Method): ventilator    O2 Concentration (%): 30    Physical exam: Intubated not on sedation  General: lying in bed with eye closed, very slightly opens eye after noxious, intubated  Eyes: Anicteric sclerae  Neck: trachea midline; ET tube in place  Cardiovascular: Afib on monitor  Pulmonary: No use of accessory muscles  Extremities: no edema    Neurologic:  -Mental status: AAOx0; intubated; very slightly opens eye after noxious; does not follow commands  -Cranial nerves:   II: Does not BTT bilaterally; brisk corneal reflex of left eye, more sluggish during testing of right eye   III, IV, VI: Pupils equally round and reactive to light; appears to have a left gaze preference  V:  Unable to test facial sensation  VII:  Right facial droop   VIII: Unable to test hearing  Motor: Withdraws to noxious on LUE: withdraws to pain; LLE, 2/5 spontaneously; RUE with trace movement spontaneously and to noxious RLE triple flexion     Sensation: localizes noxious stimulus with sternal rub with LUE; noxious sensation intact throughout   Coordination: Unable to test    LABS:                        11.4   10.78 )-----------( 139      ( 24 Sep 2023 05:40 )             35.3     09-24    140  |  103  |  39<H>  ----------------------------<  239<H>  4.5   |  29  |  0.61    Ca    9.3      24 Sep 2023 05:40  Phos  4.3     09-24  Mg     2.5     09-24    TPro  5.8<L>  /  Alb  2.8<L>  /  TBili  0.5  /  DBili  x   /  AST  34  /  ALT  11  /  AlkPhos  60  09-24      Urinalysis Basic - ( 24 Sep 2023 05:40 )    Color: x / Appearance: x / SG: x / pH: x  Gluc: 239 mg/dL / Ketone: x  / Bili: x / Urobili: x   Blood: x / Protein: x / Nitrite: x   Leuk Esterase: x / RBC: x / WBC x   Sq Epi: x / Non Sq Epi: x / Bacteria: x        RADIOLOGY & ADDITIONAL TESTS:    CT Head No Cont (09.20.23 @ 18:33) >  IMPRESSION:  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 No hemorrhagic transformation.

## 2023-09-24 NOTE — PROGRESS NOTE ADULT - TIME BILLING
review of objective data, interview of patient, and discussion of assessment plan with patient/family/multidisciplinary team. I agree with ACP, Fellow documentation except where indicated above.

## 2023-09-24 NOTE — PROGRESS NOTE ADULT - ASSESSMENT
87yo Female with PMHX of HTN, HLD, T2DM, and TIA (2.5 weeks ago; pt with L sided weakness, was admitted to [facility] and discharged on ASA without residual deficits) BIBEMS to [facility] ED as transfer from [facility] for L MCA stroke as possible thrombectomy candidate. Pt intubated at [facility] and found to hyperdense L MCA on noncon CTH. NIHSS 32 on arrival. CTH with evidence of acute L MCA infarct, CTP with mismatch volume of 238cc with mismatch ratio 6.8 within L MCA territory, CTA H/N with left MCA bifurcation occlusion, free air in right carotid deep spaces likely due to traumatic intubation. Pt out of window for thrombolytic treatment. After discussion between Dr. Ontiveros and Dr. Plascencia, patient was deemed not a candidate for mechanical thrombectomy given ischemic changes along L MCA territory on CTH with large correlating core on CTP. Course complicated by new-onset afib with RVR throughout hospitalization, cytotoxic swelling and pneumonia. Last CTH on 9/20 showing improvement in mass effect and no hemorrhagic transformation. Pt on ASA, remains intubated in ICU with no sedation; prolonged EEG on ,no seizures, EEG removed. Exam is poor but with slight improvement in motor exam, still severely disabled.  . Overall, suspect stroke is secondary to new-onset a fib detected upon admission, on asa; holding a/c given high risk of hemorrhagic conversion. Suspect severe disability and overall poor prognosis but family wishes to allow for more time. Palliative care onboard, GOC discussion being had, however at this time, family needs more time before any making any decisions. Pt remains full code.    Stroke etiology likely embolic source (cardioembolic vs hypercoagulable state), fetal PCA noted on CTA possibly explaining multiple territory CVA    Recommendations:  1)Secondary stroke prevention  - Continue ASA 81mg and Atorvastatin 80mg daily   - holding a/c given high risk of hemorrhagic conversion    2) Stroke risk factors  - A1c: 6.4  - LDL: 72  - Afib with RVR with no on AC    3) Further management  - Continue to provide ongoing support to family during GOC discussions   - Na goal WNL; daily BMP  - recommend SBP goal <180  - continue q4 neuro checks and vitals   - provide stroke education      Discussed with Neurology Fellow Dr. Elizondo and Attending Dr. Mustafa  87yo Female with PMHX of HTN, HLD, T2DM, and TIA (2.5 weeks ago; pt with L sided weakness, was admitted to [facility] and discharged on ASA without residual deficits) BIBEMS to [facility] ED as transfer from [facility] for L MCA stroke as possible thrombectomy candidate. Pt intubated at [facility] and found to hyperdense L MCA on noncon CTH. NIHSS 32 on arrival. CTH with evidence of acute L MCA infarct, CTP with mismatch volume of 238cc with mismatch ratio 6.8 within L MCA territory, CTA H/N with left MCA bifurcation occlusion, free air in right carotid deep spaces likely due to traumatic intubation. Pt out of window for thrombolytic treatment. After discussion between Dr. Ontiveros and Dr. Plascencia, patient was deemed not a candidate for mechanical thrombectomy given ischemic changes along L MCA territory on CTH with large correlating core on CTP. Course complicated by new-onset afib with RVR throughout hospitalization, cytotoxic swelling and pneumonia. Last CTH on 9/20 showing improvement in mass effect and no hemorrhagic transformation. Pt on ASA, remains intubated in ICU with no sedation; prolonged EEG on ,no seizures, EEG removed. Exam is poor but with slight improvement in motor exam, still severely disabled. Overall, suspect stroke is secondary to new-onset a fib detected upon admission, on asa; holding a/c given high risk of hemorrhagic conversion. Suspect severe disability and overall poor prognosis but family wishes to allow for more time. Palliative care onboard, GOC discussion being had, however at this time, family needs more time before any making any decisions. Pt remains full code.    Stroke etiology likely embolic source (cardioembolic vs hypercoagulable state), fetal PCA noted on CTA possibly explaining multiple territory CVA    Recommendations:  1)Secondary stroke prevention  - Continue ASA 81mg and Atorvastatin 80mg daily   - holding a/c given high risk of hemorrhagic conversion; DVT prophylaxis recommended (not full AC)    2) Stroke risk factors  - A1c: 6.4  - LDL: 72  - Afib with RVR with no on AC    3) Further management  - Continue to provide ongoing support to family during GOC discussions   - Na goal WNL; daily BMP  - recommend SBP goal <180  - continue q4 neuro checks and vitals   - provide stroke education  - DVT prophylaxis       Discussed with Neurology Fellow Dr. Elizondo and Attending Dr. Mustafa

## 2023-09-24 NOTE — PROGRESS NOTE ADULT - ASSESSMENT
88F with PMHx of HTN, HLD, T2DM presents to [facility] as a tx from [facility] i/s/o concern for large L sided MCA stroke. ICU consulted for further management.     Neuro  #MCA Stroke  #MCA Thrombus  Pt intubated @ [facility] i/s/o airway protection for suspected large L MCA stroke. CT brain stroke protocol showing opacified b/l MCAs L>R, cannot exclude thrombus. Per Neurosurgical team, CT brain perfusion showing infarction around MCA territory which is possibly too substantial to allow for adequate brain recovery even if intervention is done on thrombus.  Per Neurosurg, no plan for intervention on thrombus and MCA stroke expected to enlarge with larger infarction to be expected with possible cerebral edema however no edema witnessed on scans at this time.     - Patient weaned off sedation  - strict BP control w/ SBP <180  - Na goal 145-155 i/s/o stroke -- Na of 155 today   - No ASA per neurosurgery  - Palliative consulted i/s/o possible worsening infarction with no intervention -- family support needed- plan for family meeting at 3:00pm today    - vEEG with no seizure activity. Keppra discontinued as per neuro recs  - Continue aspirin    Cards  #Afib  New onset Afib with HR 120s. Patient bradycardiac over last few days  - Start lopressor 12.5 mg PO through NG tube BID    #Hx of HTN  #BP Control  Will exhibit strict BP control i/s/o stroke. BPs in ED labile ranging from -190s.   - c/w lopressor    Respiratory  #Acute Hypoxic Respiratory Failure i/s/o MCA Stroke w/ Asp Pna   CT Chest showing signs of possible aspiration pna i/s/o intubation. WBC 12 however likely reactive i/s/o stroke. Afebrile.   - aspiration precautions   - Last day of Ertapenam 1000mg IVP for 4 days (09/20-09/23)    Renal  #Permissive Hypernatremia i/s/o MCA Infarct   Na 136 on admission. Per protocol, NaCl 3% 100cc bolus.   -Na at goal 145-155    #Indwelling mathew  - monitor urine output -- placed in MICU    Heme  #Elevated Hgb  HGB 15 on admission likely i/s/o hemoconcentration.   - ctm  - trend CBC    Endo  #Hx of T2DM  Hx of T2DM. A1c and home medications unknown.  - f/u A1c in AM --> 6.4  - started Kaiden     ID  See pulm    Prophylaxis:  - DVT: SCDs   - GI: none    88F with PMHx of HTN, HLD, T2DM presents to [facility] as a tx from [facility] i/s/o concern for large L sided MCA stroke. ICU consulted for further management.     Neuro  #MCA Stroke  #MCA Thrombus  Pt intubated @ [facility] i/s/o airway protection for suspected large L MCA stroke. CT brain stroke protocol showing opacified b/l MCAs L>R, cannot exclude thrombus. Per Neurosurgical team, CT brain perfusion showing infarction around MCA territory which is possibly too substantial to allow for adequate brain recovery even if intervention is done on thrombus.  Per Neurosurg, no plan for intervention on thrombus and MCA stroke expected to enlarge with larger infarction to be expected with possible cerebral edema however no edema witnessed on scans at this time.     - Patient weaned off sedation  - strict BP control w/ SBP <180  - Na goal 145-155 i/s/o stroke -- Na of 155 today   - No ASA per neurosurgery  - Palliative consulted i/s/o possible worsening infarction with no intervention -- family support needed- plan for next family meeting on 09/27  - vEEG with no seizure activity. Keppra discontinued as per neuro recs  - Continue aspirin    Cards  #Afib  New onset Afib with HR 120s. Patient bradycardiac over last few days  - Continue lopressor 12.5 mg PO through NG tube BID    #Hx of HTN  #BP Control  Will exhibit strict BP control i/s/o stroke. BPs in ED labile ranging from -190s.   - c/w lopressor    Respiratory  #Acute Hypoxic Respiratory Failure i/s/o MCA Stroke w/ Asp Pna   CT Chest showing signs of possible aspiration pna i/s/o intubation. WBC 12 however likely reactive i/s/o stroke. Afebrile.   - aspiration precautions   -Completed 4-day course of Ertapenam 1000mg IVP for 4 days (09/20-09/23)    Renal  #Permissive Hypernatremia i/s/o MCA Infarct   Na 136 on admission. Per protocol, NaCl 3% 100cc bolus.   -Na at goal 145-155    #Indwelling mathew  - monitor urine output -- placed in MICU    Heme  #Elevated Hgb  HGB 15 on admission likely i/s/o hemoconcentration.   - ctm  - trend CBC    Endo  #Hx of T2DM  Hx of T2DM. A1c and home medications unknown.  - f/u A1c in AM --> 6.4  - started Kaiden     ID  See pulm    Prophylaxis:  - DVT: SCDs   - GI: none

## 2023-09-25 NOTE — PROGRESS NOTE ADULT - SUBJECTIVE AND OBJECTIVE BOX
Neurology Stroke Progress Note    INTERVAL HPI/OVERNIGHT EVENTS:  Patient seen and examined at bedside. No acute events overnight. Sinus bradycardia this AM with widened pulse pressure, if concern for inc ICP can do repeat CTH. Pt out of initial swelling window.    MEDICATIONS  (STANDING):  artificial tears (preservative free) Ophthalmic Solution 1 Drop(s) Both EYES daily  aspirin  chewable 81 milliGRAM(s) Oral every 24 hours  atorvastatin 80 milliGRAM(s) Oral at bedtime  chlorhexidine 0.12% Liquid 15 milliLiter(s) Oral Mucosa every 12 hours  chlorhexidine 2% Cloths 1 Application(s) Topical <User Schedule>  dextrose 5%. 1000 milliLiter(s) (100 mL/Hr) IV Continuous <Continuous>  dextrose 5%. 1000 milliLiter(s) (50 mL/Hr) IV Continuous <Continuous>  dextrose 50% Injectable 25 Gram(s) IV Push once  dextrose 50% Injectable 25 Gram(s) IV Push once  dextrose 50% Injectable 12.5 Gram(s) IV Push once  glucagon  Injectable 1 milliGRAM(s) IntraMuscular once  insulin lispro (ADMELOG) corrective regimen sliding scale   SubCutaneous every 6 hours    MEDICATIONS  (PRN):  dextrose Oral Gel 15 Gram(s) Oral once PRN Blood Glucose LESS THAN 70 milliGRAM(s)/deciliter      Allergies    No Known Allergies    Intolerances        Vital Signs Last 24 Hrs  T(C): 37.1 (25 Sep 2023 15:12), Max: 37.2 (25 Sep 2023 01:23)  T(F): 98.8 (25 Sep 2023 15:12), Max: 99 (25 Sep 2023 01:23)  HR: 56 (25 Sep 2023 12:00) (49 - 62)  BP: 138/63 (25 Sep 2023 12:00) (91/53 - 138/63)  BP(mean): 91 (25 Sep 2023 12:00) (67 - 96)  RR: 15 (25 Sep 2023 12:00) (12 - 37)  SpO2: 100% (25 Sep 2023 12:00) (99% - 100%)    Parameters below as of 25 Sep 2023 12:00  Patient On (Oxygen Delivery Method): ventilator    O2 Concentration (%): 30    Physical exam:  General: lying in bed, eyes open to noxious stimulus, intubated not on sedation  Eyes: Anicteric sclerae  Neck: trachea midline; ET tube in place  Cardiovascular: Afib on monitor  Pulmonary: No use of accessory muscles  Extremities: no edema    Neurologic:  -Mental status: AAOx0; intubated, no sedation; eyes open to noxious stimulus  -Cranial nerves:   II: Does not BTT bilaterally; brisk corneal reflex of left eye, more sluggish during testing of right eye   III, IV, VI: Pupils equally round and reactive to light; appears to have a left gaze preference  V:  Unable to test facial sensation  VII:  Right facial droop   VIII: Unable to test hearing  Motor: B/L LE: trace movements; RUE: 3/5, some effort against gravity, LUE: 3+/5     Sensation: localizes noxious stimulus with sternal rub with LUE; noxious sensation intact throughout   Coordination: Unable to test    LABS:                        10.9   9.51  )-----------( 118      ( 25 Sep 2023 06:26 )             32.5     09-25    138  |  102  |  35<H>  ----------------------------<  255<H>  4.4   |  29  |  0.58    Ca    8.9      25 Sep 2023 06:26  Phos  4.2     09-25  Mg     2.4     09-25    TPro  5.4<L>  /  Alb  2.8<L>  /  TBili  0.4  /  DBili  x   /  AST  39  /  ALT  11  /  AlkPhos  56  09-25      Urinalysis Basic - ( 25 Sep 2023 06:26 )    Color: x / Appearance: x / SG: x / pH: x  Gluc: 255 mg/dL / Ketone: x  / Bili: x / Urobili: x   Blood: x / Protein: x / Nitrite: x   Leuk Esterase: x / RBC: x / WBC x   Sq Epi: x / Non Sq Epi: x / Bacteria: x        RADIOLOGY & ADDITIONAL TESTS:    CT Head No Cont (09.20.23 @ 18:33) >  IMPRESSION:  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 No hemorrhagic transformation.       Neurology Stroke Progress Note    INTERVAL HPI/OVERNIGHT EVENTS:  Patient seen and examined at bedside. No acute events overnight. Sinus bradycardia this AM, lopressor D/C. with widened pulse pressure, if concern for inc ICP can do repeat CTH. Pt out of initial swelling window.    MEDICATIONS  (STANDING):  artificial tears (preservative free) Ophthalmic Solution 1 Drop(s) Both EYES daily  aspirin  chewable 81 milliGRAM(s) Oral every 24 hours  atorvastatin 80 milliGRAM(s) Oral at bedtime  chlorhexidine 0.12% Liquid 15 milliLiter(s) Oral Mucosa every 12 hours  chlorhexidine 2% Cloths 1 Application(s) Topical <User Schedule>  dextrose 5%. 1000 milliLiter(s) (100 mL/Hr) IV Continuous <Continuous>  dextrose 5%. 1000 milliLiter(s) (50 mL/Hr) IV Continuous <Continuous>  dextrose 50% Injectable 25 Gram(s) IV Push once  dextrose 50% Injectable 25 Gram(s) IV Push once  dextrose 50% Injectable 12.5 Gram(s) IV Push once  glucagon  Injectable 1 milliGRAM(s) IntraMuscular once  insulin lispro (ADMELOG) corrective regimen sliding scale   SubCutaneous every 6 hours    MEDICATIONS  (PRN):  dextrose Oral Gel 15 Gram(s) Oral once PRN Blood Glucose LESS THAN 70 milliGRAM(s)/deciliter      Allergies    No Known Allergies    Intolerances        Vital Signs Last 24 Hrs  T(C): 37.1 (25 Sep 2023 15:12), Max: 37.2 (25 Sep 2023 01:23)  T(F): 98.8 (25 Sep 2023 15:12), Max: 99 (25 Sep 2023 01:23)  HR: 56 (25 Sep 2023 12:00) (49 - 62)  BP: 138/63 (25 Sep 2023 12:00) (91/53 - 138/63)  BP(mean): 91 (25 Sep 2023 12:00) (67 - 96)  RR: 15 (25 Sep 2023 12:00) (12 - 37)  SpO2: 100% (25 Sep 2023 12:00) (99% - 100%)    Parameters below as of 25 Sep 2023 12:00  Patient On (Oxygen Delivery Method): ventilator    O2 Concentration (%): 30    Physical exam:  General: lying in bed, eyes open to noxious stimulus, intubated not on sedation  Eyes: Anicteric sclerae  Neck: trachea midline; ET tube in place  Cardiovascular: Afib on monitor  Pulmonary: No use of accessory muscles  Extremities: no edema    Neurologic:  -Mental status: AAOx0; intubated, no sedation; eyes open to noxious stimulus  -Cranial nerves:   II: Does not BTT bilaterally; brisk corneal reflex of left eye, more sluggish during testing of right eye   III, IV, VI: Pupils equally round and reactive to light; appears to have a left gaze preference  V:  Unable to test facial sensation  VII:  Right facial droop   VIII: Unable to test hearing  Motor: B/L LE: trace movements; RUE: 3/5, some effort against gravity, LUE: 3+/5     Sensation: localizes noxious stimulus with sternal rub with LUE; noxious sensation intact throughout   Coordination: Unable to test    LABS:                        10.9   9.51  )-----------( 118      ( 25 Sep 2023 06:26 )             32.5     09-25    138  |  102  |  35<H>  ----------------------------<  255<H>  4.4   |  29  |  0.58    Ca    8.9      25 Sep 2023 06:26  Phos  4.2     09-25  Mg     2.4     09-25    TPro  5.4<L>  /  Alb  2.8<L>  /  TBili  0.4  /  DBili  x   /  AST  39  /  ALT  11  /  AlkPhos  56  09-25      Urinalysis Basic - ( 25 Sep 2023 06:26 )    Color: x / Appearance: x / SG: x / pH: x  Gluc: 255 mg/dL / Ketone: x  / Bili: x / Urobili: x   Blood: x / Protein: x / Nitrite: x   Leuk Esterase: x / RBC: x / WBC x   Sq Epi: x / Non Sq Epi: x / Bacteria: x        RADIOLOGY & ADDITIONAL TESTS:    CT Head No Cont (09.20.23 @ 18:33) >  IMPRESSION:  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 No hemorrhagic transformation.

## 2023-09-25 NOTE — PROGRESS NOTE ADULT - ASSESSMENT
88F with PMHx of HTN, HLD, T2DM presents to [facility] as a tx from [facility] i/s/o concern for large L sided MCA stroke. ICU consulted for further management.     Neuro  #MCA Stroke  #MCA Thrombus  Pt intubated @ [facility] i/s/o airway protection for suspected large L MCA stroke. CT brain stroke protocol showing opacified b/l MCAs L>R, cannot exclude thrombus. Per Neurosurgical team, CT brain perfusion showing infarction around MCA territory which is possibly too substantial to allow for adequate brain recovery even if intervention is done on thrombus.  Per Neurosurg, no plan for intervention on thrombus and MCA stroke expected to enlarge with larger infarction to be expected with possible cerebral edema however no edema witnessed on scans at this time.     - Patient weaned off sedation  - strict BP control w/ SBP <180  - Na goal 145-155 i/s/o stroke -- Na of 155 today   - No ASA per neurosurgery  - Palliative consulted i/s/o possible worsening infarction with no intervention -- family support needed- plan for next family meeting on 09/27  - vEEG with no seizure activity. Keppra discontinued as per neuro recs  - Continue aspirin    Cards  #Afib  New onset Afib with HR 120s. Patient bradycardiac over last few days  - Continue lopressor 12.5 mg PO through NG tube BID    #Hx of HTN  #BP Control  Will exhibit strict BP control i/s/o stroke. BPs in ED labile ranging from -190s.   - c/w lopressor    Respiratory  #Acute Hypoxic Respiratory Failure i/s/o MCA Stroke w/ Asp Pna   CT Chest showing signs of possible aspiration pna i/s/o intubation. WBC 12 however likely reactive i/s/o stroke. Afebrile.   - aspiration precautions   -Completed 4-day course of Ertapenam 1000mg IVP for 4 days (09/20-09/23)    Renal  #Permissive Hypernatremia i/s/o MCA Infarct   Na 136 on admission. Per protocol, NaCl 3% 100cc bolus.   -Na at goal 145-155    #Indwelling mathew  - monitor urine output -- placed in MICU    Heme  #Elevated Hgb  HGB 15 on admission likely i/s/o hemoconcentration.   - ctm  - trend CBC    Endo  #Hx of T2DM  Hx of T2DM. A1c and home medications unknown.  - f/u A1c in AM --> 6.4  - started Kaiden     ID  See pulm    Prophylaxis:  - DVT: SCDs   - GI: none    88F with PMHx of HTN, HLD, T2DM presents to [facility] as a tx from [facility] i/s/o concern for large L sided MCA stroke. ICU consulted for further management.     Neuro  #MCA Stroke  #MCA Thrombus  Pt intubated @ [facility] i/s/o airway protection for suspected large L MCA stroke. CT brain stroke protocol showing opacified b/l MCAs L>R, cannot exclude thrombus. Per Neurosurgical team, CT brain perfusion showing infarction around MCA territory which is possibly too substantial to allow for adequate brain recovery even if intervention is done on thrombus.  Per Neurosurg, no plan for intervention on thrombus and MCA stroke expected to enlarge with larger infarction to be expected with possible cerebral edema however no edema witnessed on scans at this time.     - Patient weaned off sedation  - strict BP control w/ SBP <180  - Na goal 145-155 i/s/o stroke -- Na of 155 today   - No ASA per neurosurgery  - Palliative consulted i/s/o possible worsening infarction with no intervention -- family support needed- plan for next family meeting on 09/27  - vEEG with no seizure activity. Keppra discontinued as per neuro recs  - Continue aspirin    Cards  #Afib  New onset Afib with HR 120s. Patient bradycardiac over last few days  - Discontinue lopressor 12.5 mg PO through NG tube BID    #Hx of HTN  #BP Control  Will exhibit strict BP control i/s/o stroke. BPs in ED labile ranging from -190s.   - c/w lopressor    Respiratory  #Acute Hypoxic Respiratory Failure i/s/o MCA Stroke w/ Asp Pna   CT Chest showing signs of possible aspiration pna i/s/o intubation. WBC 12 however likely reactive i/s/o stroke. Afebrile.   - aspiration precautions   -Completed 4-day course of Ertapenam 1000mg IVP for 4 days (09/20-09/23)    Renal  #Permissive Hypernatremia i/s/o MCA Infarct   Na 136 on admission. Per protocol, NaCl 3% 100cc bolus.   -Na at goal 145-155    #Indwelling mathew  - monitor urine output -- placed in MICU    Heme  #Elevated Hgb  HGB 15 on admission likely i/s/o hemoconcentration.   - ctm  - trend CBC    Endo  #Hx of T2DM  Hx of T2DM. A1c and home medications unknown.  - f/u A1c in AM --> 6.4  - started Kaiden     ID  See pulm    Prophylaxis:  - DVT: SCDs   - GI: none    88F with PMHx of HTN, HLD, T2DM presents to [facility] as a tx from [facility] i/s/o concern for large L sided MCA stroke. ICU consulted for further management.     Neuro  #MCA Stroke  #MCA Thrombus  Pt intubated @ [facility] i/s/o airway protection for suspected large L MCA stroke. CT brain stroke protocol showing opacified b/l MCAs L>R, cannot exclude thrombus. Per Neurosurgical team, CT brain perfusion showing infarction around MCA territory which is possibly too substantial to allow for adequate brain recovery even if intervention is done on thrombus.  Per Neurosurg, no plan for intervention on thrombus and MCA stroke expected to enlarge with larger infarction to be expected with possible cerebral edema however no edema witnessed on scans at this time.     - Patient weaned off sedation  - strict BP control w/ SBP <180  - Na goal 145-155 i/s/o stroke -- Na of 155 today   - No ASA per neurosurgery  - Palliative consulted i/s/o possible worsening infarction with no intervention -- family support needed- plan for next family meeting on 09/27  - vEEG with no seizure activity. Keppra discontinued as per neuro recs  - Continue aspirin    Cards  #Afib  New onset Afib with HR 120s. Patient bradycardiac over last few days, corresponding with initiation of lopressor on 09/22. EKG revealing sinus bradycardia.   - Discontinue lopressor 12.5 mg PO through NG tube BID  - Continue to monitor heart rate    #Hx of HTN  #BP Control  Will exhibit strict BP control i/s/o stroke. BPs in ED labile ranging from -190s.   - Discontinue lopressor in setting of bradycardia  - Monitor BP    Respiratory  #Acute Hypoxic Respiratory Failure i/s/o MCA Stroke w/ Asp Pna   CT Chest showing signs of possible aspiration pna i/s/o intubation. WBC 12 however likely reactive i/s/o stroke. Afebrile.   - aspiration precautions   -Completed 4-day course of Ertapenam 1000mg IVP for 4 days (09/20-09/23)    Renal  #Permissive Hypernatremia i/s/o MCA Infarct   Na 136 on admission. Per protocol, NaCl 3% 100cc bolus.   -Na at goal 145-155    #Indwelling mathew  - monitor urine output -- placed in MICU    Heme  #Elevated Hgb  HGB 15 on admission likely i/s/o hemoconcentration.   - ctm  - trend CBC    Endo  #Hx of T2DM  Hx of T2DM. A1c and home medications unknown.  - f/u A1c in AM --> 6.4  - started Kaiden     ID  See pulm    Prophylaxis:  - DVT: SCDs   - GI: none

## 2023-09-25 NOTE — PROGRESS NOTE ADULT - SUBJECTIVE AND OBJECTIVE BOX
Patient is a 88y old  Female who presents with a chief complaint of stroke (23 Sep 2023 13:53)    INTERVAL HPI/OVERNIGHT EVENTS: NAEON    SUBJECTIVE: Patient seen and examined at bedside. AAOx0.     ICU Vital Signs Last 24 Hrs  ICU Vital Signs Last 24 Hrs  T(C): 37.1 (25 Sep 2023 06:03), Max: 37.4 (24 Sep 2023 09:45)  T(F): 98.8 (25 Sep 2023 06:03), Max: 99.3 (24 Sep 2023 09:45)  HR: 54 (25 Sep 2023 07:00) (49 - 63)  BP: 129/68 (25 Sep 2023 07:00) (91/53 - 129/68)  BP(mean): 92 (25 Sep 2023 07:00) (67 - 92)  ABP: --  ABP(mean): --  RR: 25 (25 Sep 2023 07:00) (12 - 32)  SpO2: 100% (25 Sep 2023 07:00) (99% - 100%)    O2 Parameters below as of 25 Sep 2023 07:00  Patient On (Oxygen Delivery Method): ventilator    O2 Concentration (%): 30    I&O's Detail  I&O's Detail    24 Sep 2023 07:01  -  25 Sep 2023 07:00  --------------------------------------------------------  IN:    Free Water: 1000 mL    IV PiggyBack: 160 mL    Jevity 1.2: 1008 mL  Total IN: 2168 mL    OUT:    Incontinent per Collection Bag (mL): 1100 mL  Total OUT: 1100 mL    Total NET: 1068 mL    Mode: AC/ CMV (Assist Control/ Continuous Mandatory Ventilation)  RR (machine): 12  TV (machine): 300  FiO2: 30  PEEP: 5  ITime: 1  MAP: 6  PIP: 12    LABS:                                 10.9   9.51  )-----------( 118      ( 25 Sep 2023 06:26 )             32.5     09-25    138  |  102  |  35<H>  ----------------------------<  255<H>  4.4   |  29  |  0.58    Ca    8.9      25 Sep 2023 06:26  Phos  4.2     09-25  Mg     2.4     09-25    TPro  5.4<L>  /  Alb  2.8<L>  /  TBili  0.4  /  DBili  x   /  AST  39  /  ALT  11  /  AlkPhos  56  09-25      Urinalysis Basic - ( 25 Sep 2023 06:26 )    Color: x / Appearance: x / SG: x / pH: x  Gluc: 255 mg/dL / Ketone: x  / Bili: x / Urobili: x   Blood: x / Protein: x / Nitrite: x   Leuk Esterase: x / RBC: x / WBC x   Sq Epi: x / Non Sq Epi: x / Bacteria: x    RADIOLOGY, EKG & ADDITIONAL TESTS: Reviewed.     Consultant(s) Notes Reviewed:  [x ] YES  [ ] NO    MEDICATIONS  (STANDING):  aspirin  chewable 81 milliGRAM(s) Oral every 24 hours  atorvastatin 80 milliGRAM(s) Oral at bedtime  chlorhexidine 0.12% Liquid 15 milliLiter(s) Oral Mucosa every 12 hours  chlorhexidine 2% Cloths 1 Application(s) Topical <User Schedule>  dextrose 5%. 1000 milliLiter(s) (100 mL/Hr) IV Continuous <Continuous>  dextrose 5%. 1000 milliLiter(s) (50 mL/Hr) IV Continuous <Continuous>  dextrose 50% Injectable 25 Gram(s) IV Push once  dextrose 50% Injectable 25 Gram(s) IV Push once  dextrose 50% Injectable 12.5 Gram(s) IV Push once  ertapenem  IVPB 1000 milliGRAM(s) IV Intermittent every 24 hours  glucagon  Injectable 1 milliGRAM(s) IntraMuscular once  insulin lispro (ADMELOG) corrective regimen sliding scale   SubCutaneous every 6 hours  metoprolol tartrate 12.5 milliGRAM(s) Oral two times a day    MEDICATIONS  (PRN):  dextrose Oral Gel 15 Gram(s) Oral once PRN Blood Glucose LESS THAN 70 milliGRAM(s)/deciliter      PHYSICAL EXAM:  General: thin; frail  HEENT: NC/AT; PERRL  Neck: intubated   Cardiovascular: Irregular rate w/ occasional rapid rhythm +S1/S2; NO M/R/G  Respiratory: CTA B/L; no W/R/R  Gastrointestinal: soft, NT/ND; +BSx4  Extremities: WWP; no edema or cyanosis; warm upper extremities with palpable radial pulses, cooler lower extremities but pulses still palpable  Vascular: 2+ radial, DP/PT pulses B/L  Neurological: AAOx0; intubated. Slight Upgoing Babinski noted    Care Discussed with Consultants/Other Providers [ x] YES  [ ] NO Patient is a 88y old  Female who presents with a chief complaint of stroke (23 Sep 2023 13:53)    INTERVAL HPI/OVERNIGHT EVENTS: NAEON. Voided 100 cc.     SUBJECTIVE: Patient seen and examined at bedside. AAOx0.     ICU Vital Signs Last 24 Hrs  ICU Vital Signs Last 24 Hrs  T(C): 37.1 (25 Sep 2023 06:03), Max: 37.4 (24 Sep 2023 09:45)  T(F): 98.8 (25 Sep 2023 06:03), Max: 99.3 (24 Sep 2023 09:45)  HR: 54 (25 Sep 2023 07:00) (49 - 63)  BP: 129/68 (25 Sep 2023 07:00) (91/53 - 129/68)  BP(mean): 92 (25 Sep 2023 07:00) (67 - 92)  ABP: --  ABP(mean): --  RR: 25 (25 Sep 2023 07:00) (12 - 32)  SpO2: 100% (25 Sep 2023 07:00) (99% - 100%)    O2 Parameters below as of 25 Sep 2023 07:00  Patient On (Oxygen Delivery Method): ventilator    O2 Concentration (%): 30    I&O's Detail  I&O's Detail    24 Sep 2023 07:01  -  25 Sep 2023 07:00  --------------------------------------------------------  IN:    Free Water: 1000 mL    IV PiggyBack: 160 mL    Jevity 1.2: 1008 mL  Total IN: 2168 mL    OUT:    Incontinent per Collection Bag (mL): 1100 mL  Total OUT: 1100 mL    Total NET: 1068 mL    Mode: AC/ CMV (Assist Control/ Continuous Mandatory Ventilation)  RR (machine): 12  TV (machine): 300  FiO2: 30  PEEP: 5  ITime: 1  MAP: 6  PIP: 12    LABS:                                 10.9   9.51  )-----------( 118      ( 25 Sep 2023 06:26 )             32.5     09-25    138  |  102  |  35<H>  ----------------------------<  255<H>  4.4   |  29  |  0.58    Ca    8.9      25 Sep 2023 06:26  Phos  4.2     09-25  Mg     2.4     09-25    TPro  5.4<L>  /  Alb  2.8<L>  /  TBili  0.4  /  DBili  x   /  AST  39  /  ALT  11  /  AlkPhos  56  09-25      Urinalysis Basic - ( 25 Sep 2023 06:26 )    Color: x / Appearance: x / SG: x / pH: x  Gluc: 255 mg/dL / Ketone: x  / Bili: x / Urobili: x   Blood: x / Protein: x / Nitrite: x   Leuk Esterase: x / RBC: x / WBC x   Sq Epi: x / Non Sq Epi: x / Bacteria: x    RADIOLOGY, EKG & ADDITIONAL TESTS: Reviewed.     Consultant(s) Notes Reviewed:  [x ] YES  [ ] NO    MEDICATIONS  (STANDING):  aspirin  chewable 81 milliGRAM(s) Oral every 24 hours  atorvastatin 80 milliGRAM(s) Oral at bedtime  chlorhexidine 0.12% Liquid 15 milliLiter(s) Oral Mucosa every 12 hours  chlorhexidine 2% Cloths 1 Application(s) Topical <User Schedule>  dextrose 5%. 1000 milliLiter(s) (100 mL/Hr) IV Continuous <Continuous>  dextrose 5%. 1000 milliLiter(s) (50 mL/Hr) IV Continuous <Continuous>  dextrose 50% Injectable 25 Gram(s) IV Push once  dextrose 50% Injectable 25 Gram(s) IV Push once  dextrose 50% Injectable 12.5 Gram(s) IV Push once  ertapenem  IVPB 1000 milliGRAM(s) IV Intermittent every 24 hours  glucagon  Injectable 1 milliGRAM(s) IntraMuscular once  insulin lispro (ADMELOG) corrective regimen sliding scale   SubCutaneous every 6 hours  metoprolol tartrate 12.5 milliGRAM(s) Oral two times a day    MEDICATIONS  (PRN):  dextrose Oral Gel 15 Gram(s) Oral once PRN Blood Glucose LESS THAN 70 milliGRAM(s)/deciliter      PHYSICAL EXAM:  General: thin; frail  HEENT: NC/AT; PERRL  Neck: intubated   Cardiovascular: Irregular rate w/ occasional rapid rhythm +S1/S2; NO M/R/G  Respiratory: CTA B/L; no W/R/R  Gastrointestinal: soft, NT/ND; +BSx4  Extremities: WWP; no edema or cyanosis; warm upper extremities with palpable radial pulses, cooler lower extremities but pulses still palpable  Vascular: 2+ radial, DP/PT pulses B/L  Neurological: AAOx0; intubated. Slight Upgoing Babinski noted    Care Discussed with Consultants/Other Providers [ x] YES  [ ] NO Patient is a 88y old  Female who presents with a chief complaint of stroke (23 Sep 2023 13:53)    INTERVAL HPI/OVERNIGHT EVENTS: NAEON. Voided 100 cc. Bradycardic over night,     SUBJECTIVE: Patient seen and examined at bedside. AAOx0.     ICU Vital Signs Last 24 Hrs  ICU Vital Signs Last 24 Hrs  T(C): 37.1 (25 Sep 2023 06:03), Max: 37.4 (24 Sep 2023 09:45)  T(F): 98.8 (25 Sep 2023 06:03), Max: 99.3 (24 Sep 2023 09:45)  HR: 54 (25 Sep 2023 07:00) (49 - 63)  BP: 129/68 (25 Sep 2023 07:00) (91/53 - 129/68)  BP(mean): 92 (25 Sep 2023 07:00) (67 - 92)  ABP: --  ABP(mean): --  RR: 25 (25 Sep 2023 07:00) (12 - 32)  SpO2: 100% (25 Sep 2023 07:00) (99% - 100%)    O2 Parameters below as of 25 Sep 2023 07:00  Patient On (Oxygen Delivery Method): ventilator    O2 Concentration (%): 30    I&O's Detail  I&O's Detail    24 Sep 2023 07:01  -  25 Sep 2023 07:00  --------------------------------------------------------  IN:    Free Water: 1000 mL    IV PiggyBack: 160 mL    Jevity 1.2: 1008 mL  Total IN: 2168 mL    OUT:    Incontinent per Collection Bag (mL): 1100 mL  Total OUT: 1100 mL    Total NET: 1068 mL    Mode: AC/ CMV (Assist Control/ Continuous Mandatory Ventilation)  RR (machine): 12  TV (machine): 300  FiO2: 30  PEEP: 5  ITime: 1  MAP: 6  PIP: 12    LABS:                                 10.9   9.51  )-----------( 118      ( 25 Sep 2023 06:26 )             32.5     09-25    138  |  102  |  35<H>  ----------------------------<  255<H>  4.4   |  29  |  0.58    Ca    8.9      25 Sep 2023 06:26  Phos  4.2     09-25  Mg     2.4     09-25    TPro  5.4<L>  /  Alb  2.8<L>  /  TBili  0.4  /  DBili  x   /  AST  39  /  ALT  11  /  AlkPhos  56  09-25      Urinalysis Basic - ( 25 Sep 2023 06:26 )    Color: x / Appearance: x / SG: x / pH: x  Gluc: 255 mg/dL / Ketone: x  / Bili: x / Urobili: x   Blood: x / Protein: x / Nitrite: x   Leuk Esterase: x / RBC: x / WBC x   Sq Epi: x / Non Sq Epi: x / Bacteria: x    RADIOLOGY, EKG & ADDITIONAL TESTS: Reviewed.     Consultant(s) Notes Reviewed:  [x ] YES  [ ] NO    MEDICATIONS  (STANDING):  aspirin  chewable 81 milliGRAM(s) Oral every 24 hours  atorvastatin 80 milliGRAM(s) Oral at bedtime  chlorhexidine 0.12% Liquid 15 milliLiter(s) Oral Mucosa every 12 hours  chlorhexidine 2% Cloths 1 Application(s) Topical <User Schedule>  dextrose 5%. 1000 milliLiter(s) (100 mL/Hr) IV Continuous <Continuous>  dextrose 5%. 1000 milliLiter(s) (50 mL/Hr) IV Continuous <Continuous>  dextrose 50% Injectable 25 Gram(s) IV Push once  dextrose 50% Injectable 25 Gram(s) IV Push once  dextrose 50% Injectable 12.5 Gram(s) IV Push once  ertapenem  IVPB 1000 milliGRAM(s) IV Intermittent every 24 hours  glucagon  Injectable 1 milliGRAM(s) IntraMuscular once  insulin lispro (ADMELOG) corrective regimen sliding scale   SubCutaneous every 6 hours  metoprolol tartrate 12.5 milliGRAM(s) Oral two times a day    MEDICATIONS  (PRN):  dextrose Oral Gel 15 Gram(s) Oral once PRN Blood Glucose LESS THAN 70 milliGRAM(s)/deciliter      PHYSICAL EXAM:  General: thin; frail  HEENT: NC/AT; PERRL  Neck: intubated   Cardiovascular: Irregular rate w/ occasional rapid rhythm +S1/S2; NO M/R/G  Respiratory: CTA B/L; no W/R/R  Gastrointestinal: soft, NT/ND; +BSx4  Extremities: WWP; no edema or cyanosis; warm upper extremities with palpable radial pulses, cooler lower extremities but pulses still palpable  Vascular: 2+ radial, DP/PT pulses B/L  Neurological: AAOx0; intubated. Slight Upgoing Babinski noted    Care Discussed with Consultants/Other Providers [ x] YES  [ ] NO

## 2023-09-25 NOTE — EEG REPORT - NS EEG TEXT BOX
[facility] Department of Neurology  Inpatient Continuous video-Electroencephalogram      Patient Name:	AMANDA JESSICA    :	1934  MRN:	[id number]    Study Start Date/Time:	2023, 10:13:08 AM  Study End Date/Time:	2023, 12:20 PM    Referred by: Dr. Frey/Dr. Ontiveros    Brief Clinical History:  AMANDA JESSICA is a 88 year old Female with L MCA stroke; study performed to investigate for seizures or markers of epilepsy.  Diagnosis Code:  R56.9 convulsions/seizure    Pertinent Medication:  n/a    Acquisition Details:  Electroencephalography was acquired using a minimum of 21 channels on an [employer] Neurology system v 9.3.1 with electrode placement according to the standard International 10-20 system following ACNS (American Clinical Neurophysiology Society) guidelines.  Anterior temporal T1 and T2 electrodes were utilized whenever possible.  The XLTEK automated spike & seizure detections were all reviewed in detail, in addition to the entire raw EEG.    Findings:  Day 1:  2023, 10:13:08 AM to next morning at 07:00 AM   Background:  continuous, with predominantly alpha/theta frequencies over the right hemisphere and theta-alpha frequencies over the left hemisphere.  Symmetry:  relative attenuation of the left hemisphere, particularly in the parasaggital regions..   Posterior Dominant Rhythm:  10 Hz, better formed over the right.   Organization: Appropriate anterior-posterior gradient.  Voltage:  Normal (20+ uV)  Variability: Yes. 		Reactivity: Yes.  N2 sleep: Symmetric, synchronous spindles and K complexes.  Spontaneous Activity:  No epileptiform discharges.  Rarely there was an increase in sharply contoured sporadic left frontal delta waves, nearly periodic.  Periodic/rhythmic activity:  None  Events:  No electrographic seizures or significant clinical events.  Provocations:  Hyperventilation and Photic stimulation: was not performed.    Daily Summary:    Continued focal slowing and relative attenuation over the left hemisphere, suggestive of focal cortical and subcortical dysfunction.   Relative attenuation can also be due to absorption of electrical activity, such as due to extra-cerebral focal fluid collections.  No definite epileptiform discharges.      Dev Gutierrez MD  Attending Neurologist, [facility]      Daily Updates (from 07:00 am until 07:00 am):  Day 2  Sep 22-23, 2023:   Background:  continuous, with predominantly moderate to high amplitude alpha/theta frequencies over the right hemisphere and theta-alpha frequencies over the left hemisphere.  Symmetry:  relative attenuation of the left hemisphere, particularly in the parasaggital regions..   Posterior Dominant Rhythm:  10 Hz, becoming well-regulated over the right by end of study, 9-10 Hz on the Left and poorly regulated.  Organization: Appropriate anterior-posterior gradient.  Voltage:  Normal (20+ uV)  Variability: Yes. 		Reactivity: Yes.  N2 sleep: Symmetric, synchronous spindles and K complexes.  Spontaneous Activity:  No epileptiform discharges.    Frequent sharply contoured semi-rhythmic theta over the right hemisphere, but not appearing epilpetiform.  Periodic/rhythmic activity:  None  Events:  No electrographic seizures or significant clinical events.  Provocations:  Hyperventilation and Photic stimulation: was not performed.    Daily Summary:    Continued focal slowing and relative attenuation over the left hemisphere, suggestive of focal cortical and subcortical dysfunction.   Relative attenuation can also be due to absorption of electrical activity, such as due to extra-cerebral focal fluid collections.  No definite epileptiform discharges.      Dev Gutierrez MD  Attending Neurologist, [facility]        Daily Updates (from 07:00 am until 07:00 am):  Day 3  Sep 23-24, 2023:   Background:  continuous, with predominantly moderate to high amplitude alpha/theta frequencies over the right hemisphere and theta-alpha frequencies over the left hemisphere.  Symmetry:  relative attenuation of the left hemisphere, particularly in the parasaggital regions.  Posterior Dominant Rhythm:  10 Hz, becoming well-regulated over the right by end of study, 9-10 Hz on the Left and poorly regulated.  Organization: Appropriate anterior-posterior gradient.  Voltage:  Normal (20+ uV)  Variability: Yes. 		Reactivity: Yes.  N2 sleep: Symmetric, synchronous spindles and K complexes.  Spontaneous Activity:  No epileptiform discharges.    Frequent sharply contoured semi-rhythmic theta over the right hemisphere, but not appearing epilpetiform.  Periodic/rhythmic activity:  None  Events:  No electrographic seizures or significant clinical events.  Provocations:  Hyperventilation and Photic stimulation: was not performed.    Daily Summary:    Continued focal slowing and relative attenuation over the left hemisphere, suggestive of focal cortical and subcortical dysfunction.   Relative attenuation can also be due to absorption of electrical activity, such as due to extra-cerebral focal fluid collections.  No definite epileptiform discharges.      Dev Gutierrez MD  Attending Neurologist, [facility]        Daily Updates  Day 4  Sep 24, 07:00AM - 5:07PM:   Background:  continuous, with predominantly moderate to high amplitude alpha/theta frequencies over the right hemisphere and theta-alpha frequencies over the left hemisphere.  Symmetry:  relative attenuation of the left hemisphere, particularly in the parasaggital regions.  Posterior Dominant Rhythm:  10 Hz, becoming well-regulated over the right by end of study, 9-10 Hz on the Left and poorly regulated.  Organization: Appropriate anterior-posterior gradient.  Voltage:  Normal (20+ uV)  Variability: Yes. 		Reactivity: Yes.  N2 sleep: Symmetric, synchronous spindles and K complexes.  Spontaneous Activity:  No epileptiform discharges.    Frequent sharply contoured semi-rhythmic theta over the right hemisphere, but not appearing epileptiform.  Periodic/rhythmic activity:  None  Events:  No electrographic seizures or significant clinical events.  Provocations:  Hyperventilation and Photic stimulation: was not performed.    Daily Summary:    Continued focal slowing and relative attenuation over the left hemisphere, suggestive of focal cortical and subcortical dysfunction.   Relative attenuation can also be due to absorption of electrical activity, such as due to extra-cerebral focal fluid collections.  No definite epileptiform discharges.      Dev Gutierrez MD  Attending Neurologist, [facility]        FINAL Impression:  Abnormal Continuous/long-term video-EEG  1)	Continued focal slowing and relative attenuation over the left hemisphere  2)	No definite epileptiform discharges.    Final Clinical Correlation:  1)	Findings suggestive of left hemispheric cortical and subcortical dysfunction.   Attenuation can also be due to absorption of electrical activity, such as due to extra-cerebral focal fluid collections.    2)	There were no findings of active epilepsy, however this alone does not rule out the diagnosis.      Dev Gutierrez MD  Attending Neurologist, [facility]

## 2023-09-25 NOTE — PROGRESS NOTE ADULT - ASSESSMENT
89yo Female with PMHX of HTN, HLD, T2DM, and TIA (2.5 weeks ago; pt with L sided weakness, was admitted to [facility] and discharged on ASA without residual deficits) BIBEMS to [facility] ED as transfer from [facility] for L MCA stroke as possible thrombectomy candidate. Pt intubated at [facility] and found to hyperdense L MCA on noncon CTH. NIHSS 32 on arrival. CTH with evidence of acute L MCA infarct, CTP with mismatch volume of 238cc with mismatch ratio 6.8 within L MCA territory, CTA H/N with left MCA bifurcation occlusion, free air in right carotid deep spaces likely due to traumatic intubation. Pt out of window for thrombolytic treatment. After discussion between Dr. Ontiveros and Dr. Plascencia, patient was deemed not a candidate for mechanical thrombectomy given ischemic changes along L MCA territory on CTH with large correlating core on CTP. Course complicated by new-onset afib with RVR throughout hospitalization, cytotoxic swelling and pneumonia. Last CTH on 9/20 showing improvement in mass effect and no hemorrhagic transformation. Pt on ASA, remains intubated in ICU with no sedation; prolonged EEG on ,no seizures, EEG removed. Exam is poor but with slight improvement in motor exam, still severely disabled. Overall, suspect stroke is secondary to new-onset a fib detected upon admission, on asa; holding a/c given high risk of hemorrhagic conversion. Suspect severe disability and overall poor prognosis but family wishes to allow for more time. Palliative care onboard, GOC discussion being had, however at this time, family needs more time before any making any decisions. Pt remains full code. Family meeting planned for 9/27 to discuss PEG/Trach.     Stroke etiology likely embolic source (cardioembolic vs hypercoagulable state), fetal PCA noted on CTA possibly explaining multiple territory CVA    Recommendations:  1)Secondary stroke prevention  - Continue ASA 81mg and Atorvastatin 80mg daily   - holding a/c given high risk of hemorrhagic conversion; DVT prophylaxis recommended (not full AC)  - 6 days out from large CVA, likely to start AC at day 10    2) Stroke risk factors  - A1c: 6.4  - LDL: 72  - Afib with RVR with no on AC    3) Further management  - Continue to provide ongoing support to family during GOC discussions   - recommend SBP goal <180  - continue q4 neuro checks and vitals   - provide stroke education  - DVT prophylaxis     Discussed Neurology Attending Dr. Whitaker

## 2023-09-26 NOTE — EEG REPORT - NS EEG TEXT BOX
[facility] Department of Neurology  Inpatient Continuous video-Electroencephalography Report    Acquisition Details:  Electroencephalography was acquired using a minimum of 21 channels on an [employer] Neurology system v 9.3.1 with electrode placement according to the standard International 10-20 system following ACNS (American Clinical Neurophysiology Society) guidelines for Long-Term Video EEG monitoring.  Anterior temporal T1 and T2 electrodes were utilized whenever possible.   The XLTEK automated spike & seizure detections were all reviewed in detail, in addition to extensive portions of raw EEG.      Daily Updates (from 07:00 am until 07:00 am):  Day 3  Sep 23-24, 2023:   Background:  continuous, with predominantly moderate to high amplitude alpha/theta frequencies over the right hemisphere and theta-alpha frequencies over the left hemisphere.  Symmetry:  relative attenuation of the left hemisphere, particularly in the parasaggital regions.  Posterior Dominant Rhythm:  10 Hz, becoming well-regulated over the right by end of study, 9-10 Hz on the Left and poorly regulated.  Organization: Appropriate anterior-posterior gradient.  Voltage:  Normal (20+ uV)  Variability: Yes. 		Reactivity: Yes.  N2 sleep: Symmetric, synchronous spindles and K complexes.  Spontaneous Activity:  No epileptiform discharges.    Frequent sharply contoured semi-rhythmic theta over the right hemisphere, but not appearing epilpetiform.  Periodic/rhythmic activity:  None  Events:  No electrographic seizures or significant clinical events.  Provocations:  Hyperventilation and Photic stimulation: was not performed.    Daily Summary:    Continued focal slowing and relative attenuation over the left hemisphere, suggestive of focal cortical and subcortical dysfunction.   Relative attenuation can also be due to absorption of electrical activity, such as due to extra-cerebral focal fluid collections.  No definite epileptiform discharges.      Dev Gutierrez MD  Attending Neurologist, [facility]

## 2023-09-26 NOTE — PROGRESS NOTE ADULT - SUBJECTIVE AND OBJECTIVE BOX
Patient is a 88y old  Female who presents with a chief complaint of stroke (23 Sep 2023 13:53)    INTERVAL HPI/OVERNIGHT EVENTS: Ordered oral care.     SUBJECTIVE: Patient seen and examined at bedside. AAOx0.     ICU Vital Signs Last 24 Hrs  ICU Vital Signs Last 24 Hrs  T(C): 37.7 (26 Sep 2023 05:51), Max: 37.9 (25 Sep 2023 22:02)  T(F): 99.8 (26 Sep 2023 05:51), Max: 100.2 (25 Sep 2023 22:02)  HR: 75 (26 Sep 2023 07:00) (53 - 98)  BP: 80/53 (26 Sep 2023 07:00) (80/53 - 148/61)  BP(mean): 61 (26 Sep 2023 07:00) (61 - 98)  ABP: --  ABP(mean): --  RR: 16 (26 Sep 2023 07:00) (15 - 37)  SpO2: 100% (26 Sep 2023 07:00) (98% - 100%)    O2 Parameters below as of 26 Sep 2023 06:00  Patient On (Oxygen Delivery Method): ventilator    O2 Concentration (%): 30    I&O's Detail    25 Sep 2023 07:01  -  26 Sep 2023 07:00  --------------------------------------------------------  IN:    Enteral Tube Flush: 90 mL    Free Water: 1000 mL    IV PiggyBack: 5 mL    Jevity 1.2: 840 mL  Total IN: 1935 mL    OUT:    Incontinent per Collection Bag (mL): 2350 mL  Total OUT: 2350 mL    Total NET: -415 mL    Mode: AC/ CMV (Assist Control/ Continuous Mandatory Ventilation)  RR (machine): 12  TV (machine): 300  FiO2: 30  PEEP: 5  ITime: 1  MAP: 6  PIP: 12    LABS:                          13.0   11.07 )-----------( 150      ( 26 Sep 2023 05:18 )             39.3     09-26    136  |  101  |  26<H>  ----------------------------<  267<H>  4.4   |  27  |  0.53    Ca    9.3      26 Sep 2023 05:18  Phos  4.1     09-26  Mg     2.4     09-26    TPro  6.1  /  Alb  3.0<L>  /  TBili  0.5  /  DBili  x   /  AST  45<H>  /  ALT  14  /  AlkPhos  66  09-26      Urinalysis Basic - ( 26 Sep 2023 05:18 )    Color: x / Appearance: x / SG: x / pH: x  Gluc: 267 mg/dL / Ketone: x  / Bili: x / Urobili: x   Blood: x / Protein: x / Nitrite: x   Leuk Esterase: x / RBC: x / WBC x   Sq Epi: x / Non Sq Epi: x / Bacteria: x    RADIOLOGY, EKG & ADDITIONAL TESTS: Reviewed.     Consultant(s) Notes Reviewed:  [x ] YES  [ ] NO    MEDICATIONS  (STANDING):  aspirin  chewable 81 milliGRAM(s) Oral every 24 hours  atorvastatin 80 milliGRAM(s) Oral at bedtime  chlorhexidine 0.12% Liquid 15 milliLiter(s) Oral Mucosa every 12 hours  chlorhexidine 2% Cloths 1 Application(s) Topical <User Schedule>  dextrose 5%. 1000 milliLiter(s) (100 mL/Hr) IV Continuous <Continuous>  dextrose 5%. 1000 milliLiter(s) (50 mL/Hr) IV Continuous <Continuous>  dextrose 50% Injectable 25 Gram(s) IV Push once  dextrose 50% Injectable 25 Gram(s) IV Push once  dextrose 50% Injectable 12.5 Gram(s) IV Push once  ertapenem  IVPB 1000 milliGRAM(s) IV Intermittent every 24 hours  glucagon  Injectable 1 milliGRAM(s) IntraMuscular once  insulin lispro (ADMELOG) corrective regimen sliding scale   SubCutaneous every 6 hours  metoprolol tartrate 12.5 milliGRAM(s) Oral two times a day    MEDICATIONS  (PRN):  dextrose Oral Gel 15 Gram(s) Oral once PRN Blood Glucose LESS THAN 70 milliGRAM(s)/deciliter      PHYSICAL EXAM:  General: thin; frail  HEENT: NC/AT; PERRL  Neck: intubated   Cardiovascular: Irregular rate w/ occasional rapid rhythm +S1/S2; NO M/R/G  Respiratory: CTA B/L; no W/R/R  Gastrointestinal: soft, NT/ND; +BSx4  Extremities: WWP; no edema or cyanosis; warm upper extremities with palpable radial pulses, cooler lower extremities but pulses still palpable  Vascular: 2+ radial, DP/PT pulses B/L  Neurological: AAOx0; intubated. Slight Upgoing Babinski noted    Care Discussed with Consultants/Other Providers [ x] YES  [ ] NO

## 2023-09-26 NOTE — PROGRESS NOTE ADULT - SUBJECTIVE AND OBJECTIVE BOX
SUBJECTIVE AND OBJECTIVE:  Indication for Geriatrics and Palliative Care Services: GOC    OVERNIGHT EVENTS: Family endorsing that patient moving her limbs more, waking up more. On exam, patient unresponsive but withdrawing when suctioned. Moving limbs spontaneously.    Allergies    No Known Allergies    Intolerances    MEDICATIONS  (STANDING):  artificial tears (preservative free) Ophthalmic Solution 1 Drop(s) Both EYES daily  aspirin  chewable 81 milliGRAM(s) Oral every 24 hours  atorvastatin 80 milliGRAM(s) Oral at bedtime  chlorhexidine 0.12% Liquid 15 milliLiter(s) Oral Mucosa every 12 hours  chlorhexidine 2% Cloths 1 Application(s) Topical <User Schedule>  dextrose 5%. 1000 milliLiter(s) (100 mL/Hr) IV Continuous <Continuous>  dextrose 5%. 1000 milliLiter(s) (50 mL/Hr) IV Continuous <Continuous>  dextrose 50% Injectable 25 Gram(s) IV Push once  dextrose 50% Injectable 25 Gram(s) IV Push once  dextrose 50% Injectable 12.5 Gram(s) IV Push once  glucagon  Injectable 1 milliGRAM(s) IntraMuscular once  insulin lispro (ADMELOG) corrective regimen sliding scale   SubCutaneous every 6 hours  metoprolol tartrate 12.5 milliGRAM(s) Oral every 12 hours    MEDICATIONS  (PRN):  dextrose Oral Gel 15 Gram(s) Oral once PRN Blood Glucose LESS THAN 70 milliGRAM(s)/deciliter      ITEMS UNCHECKED ARE NOT PRESENT    PRESENT SYMPTOMS: [X]Unable to self-report  Source if other than patient:  [ ]Family   [ ]Team     Pain:  [ ]yes [ ]no  QOL impact -   Location -                    Aggravating factors -  Quality -  Radiation -  Timing-  Severity (0-10 scale):  Minimal acceptable level (0-10 scale):     Dyspnea:                           [ ]Mild [ ]Moderate [ ]Severe  Anxiety:                             [ ]Mild [ ]Moderate [ ]Severe  Fatigue:                             [ ]Mild [ ]Moderate [ ]Severe  Nausea:                             [ ]Mild [ ]Moderate [ ]Severe  Loss of appetite:              [ ]Mild [ ]Moderate [ ]Severe  Constipation:                    [ ]Mild [ ]Moderate [ ]Severe    Other Symptoms:  [X]All other review of systems negative     Palliative Performance Status Version 2:        10 %      http://npcrc.org/files/news/palliative_performance_scale_ppsv2.pdf    PHYSICAL EXAM:  Vital Signs Last 24 Hrs  T(C): 37.3 (26 Sep 2023 14:00), Max: 37.9 (25 Sep 2023 22:02)  T(F): 99.1 (26 Sep 2023 14:00), Max: 100.2 (25 Sep 2023 22:02)  HR: 96 (26 Sep 2023 15:00) (61 - 98)  BP: 94/62 (26 Sep 2023 15:00) (80/53 - 141/59)  BP(mean): 71 (26 Sep 2023 15:00) (61 - 98)  RR: 25 (26 Sep 2023 15:00) (16 - 31)  SpO2: 100% (26 Sep 2023 15:00) (98% - 100%)    Parameters below as of 26 Sep 2023 15:00  Patient On (Oxygen Delivery Method): ventilator    O2 Concentration (%): 30 I&O's Summary    25 Sep 2023 07:01  -  26 Sep 2023 07:00  --------------------------------------------------------  IN: 1935 mL / OUT: 2350 mL / NET: -415 mL    26 Sep 2023 07:01  -  26 Sep 2023 16:18  --------------------------------------------------------  IN: 400 mL / OUT: 300 mL / NET: 100 mL       GENERAL: [ ]Cachexia    [ ]Alert  [ ]Oriented x   [ ]Lethargic  [X]Unarousable  [ ]Verbal  [ ]Non-Verbal  Behavioral:   [ ]Anxiety  [ ]Delirium [ ]Agitation [X]Calm  HEENT:  [ ]Normal   [ ]Dry mouth   [X]ET Tube  [ ]Oral lesions  PULMONARY:   [X]Clear [ ]Tachypnea  [ ]Audible excessive secretions   [ ]Rhonchi        [ ]Right [ ]Left [ ]Bilateral  [ ]Crackles        [ ]Right [ ]Left [ ]Bilateral  [ ]Wheezing     [ ]Right [ ]Left [ ]Bilateral  [ ]Diminished BS [ ] Right [ ]Left [ ]Bilateral  CARDIOVASCULAR:    [ X]Regular [ ]Irregular [ ]Tachy  [ ]Isma [ ]Murmur [ ]Other  GASTROINTESTINAL:  [X]Soft  [ ]Distended   [ ]+BS  [ ]Non tender [ ]Tender  [ ]Other [ ]PEG [X] NGT   Last BM:   GENITOURINARY:  [ ]Normal [ ]Incontinent   [ ]Oliguria/Anuria   [X]Carlos  MUSCULOSKELETAL:   [ ]Normal   [  ]Weakness  [X]Bed/Wheelchair bound [ ]Edema  NEUROLOGIC:   [ ]No focal deficits  [X] Cognitive impairment  [ ] Dysphagia [ ]Dysarthria [ ] Paresis [ ]Other   SKIN:   [X]Normal  [ ]Rash  [ ]Other  [ ]Pressure ulcer(s) [ ]y [ ]n present on admission    CRITICAL CARE:  [ ]Shock Present  [ ]Septic [ ]Cardiogenic [ ]Neurologic [ ]Hypovolemic  [ ]Vasopressors [ ]Inotropes  [ ]Respiratory failure present [ ]Mechanical Ventilation [ ]Non-invasive ventilatory support [ ]High-Flow Mode: CPAP with PS, TV (machine): 300, FiO2: 30, PEEP: 5, PS: 5, MAP: 6.7, PIP: 10  [ ]Acute  [ ]Chronic [ ]Hypoxic  [ ]Hypercarbic [ ]Other  [ ]Other organ failure     LABS:                        13.0   11.07 )-----------( 150      ( 26 Sep 2023 05:18 )             39.3   09-26    136  |  101  |  26<H>  ----------------------------<  267<H>  4.4   |  27  |  0.53    Ca    9.3      26 Sep 2023 05:18  Phos  4.1     09-26  Mg     2.4     09-26    TPro  6.1  /  Alb  3.0<L>  /  TBili  0.5  /  DBili  x   /  AST  45<H>  /  ALT  14  /  AlkPhos  66  09-26      Urinalysis Basic - ( 26 Sep 2023 05:18 )    Color: x / Appearance: x / SG: x / pH: x  Gluc: 267 mg/dL / Ketone: x  / Bili: x / Urobili: x   Blood: x / Protein: x / Nitrite: x   Leuk Esterase: x / RBC: x / WBC x   Sq Epi: x / Non Sq Epi: x / Bacteria: x      RADIOLOGY & ADDITIONAL STUDIES:    Protein Calorie Malnutrition Present: [ ]mild [ ]moderate [ ]severe [ ]underweight [ ]morbid obesity  https://www.andeal.org/vault/2440/web/files/ONC/Table_Clinical%20Characteristics%20to%20Document%20Malnutrition-White%20JV%20et%20al%202012.pdf    Height (cm): 152.4 (09-17-23 @ 18:00)  Weight (kg): 51 (09-15-23 @ 22:34)  BMI (kg/m2): 22 (09-17-23 @ 18:00)    [X]PPSV2 < or = 30%  [ ]significant weight loss [ ]poor nutritional intake [ ]anasarca[X]Artificial Nutrition    REFERRALS:   [ ]Chaplaincy  [ ]Hospice  [ ]Child Life  [ ]Social Work [X]Patient and Family Support [ ]Case management [ ]Holistic Therapy [ ] Music therapy  [ ] Massage Therapy    DISCUSSION OF CASE: Family - obtained additional history and to provide emotional support;  Primary team - discussed plan of care

## 2023-09-26 NOTE — PROGRESS NOTE ADULT - SUBJECTIVE AND OBJECTIVE BOX
Neurology Stroke Progress Note    INTERVAL HPI/OVERNIGHT EVENTS:  Patient seen and examined. Remains intubated, off of sedation.     MEDICATIONS  (STANDING):  artificial tears (preservative free) Ophthalmic Solution 1 Drop(s) Both EYES daily  aspirin  chewable 81 milliGRAM(s) Oral every 24 hours  atorvastatin 80 milliGRAM(s) Oral at bedtime  chlorhexidine 0.12% Liquid 15 milliLiter(s) Oral Mucosa every 12 hours  chlorhexidine 2% Cloths 1 Application(s) Topical <User Schedule>  dextrose 5%. 1000 milliLiter(s) (50 mL/Hr) IV Continuous <Continuous>  dextrose 5%. 1000 milliLiter(s) (100 mL/Hr) IV Continuous <Continuous>  dextrose 50% Injectable 25 Gram(s) IV Push once  dextrose 50% Injectable 25 Gram(s) IV Push once  dextrose 50% Injectable 12.5 Gram(s) IV Push once  glucagon  Injectable 1 milliGRAM(s) IntraMuscular once  insulin lispro (ADMELOG) corrective regimen sliding scale   SubCutaneous every 6 hours  metoprolol tartrate 12.5 milliGRAM(s) Oral every 12 hours    MEDICATIONS  (PRN):  dextrose Oral Gel 15 Gram(s) Oral once PRN Blood Glucose LESS THAN 70 milliGRAM(s)/deciliter      Allergies    No Known Allergies    Intolerances        Vital Signs Last 24 Hrs  T(C): 37.2 (26 Sep 2023 09:19), Max: 37.9 (25 Sep 2023 22:02)  T(F): 99 (26 Sep 2023 09:19), Max: 100.2 (25 Sep 2023 22:02)  HR: 90 (26 Sep 2023 10:13) (53 - 98)  BP: 94/61 (26 Sep 2023 10:00) (80/53 - 148/61)  BP(mean): 70 (26 Sep 2023 10:00) (61 - 98)  RR: 25 (26 Sep 2023 10:00) (15 - 31)  SpO2: 100% (26 Sep 2023 10:13) (98% - 100%)    Parameters below as of 26 Sep 2023 10:00  Patient On (Oxygen Delivery Method): ventilator    O2 Concentration (%): 30    Physical exam:  General: lying in bed, eyes closed, intubated not on sedation  Eyes: Anicteric sclerae  Neck: trachea midline; ET tube in place  Cardiovascular: Afib on monitor  Pulmonary: No use of accessory muscles  Extremities: no edema    Neurologic:  -Mental status: AAOx0; intubated, no sedation; seems to resist to eye-opening by examiner, does not open eyes when noxious stimuli applied  -Cranial nerves:   II: Does not BTT bilaterally; brisk corneal reflex of left eye, more sluggish during testing of right eye  III, IV, VI: Pupils equally round and reactive to light; appears to have a left gaze preference  V:  Unable to test facial sensation  VII:  Right facial droop   VIII: Unable to test hearing  Motor: Right upper extremity 2/5, right lower extremity with triple flexion, 1/5. Left upper extremity 3/5, left lower extremity 2/5.  Sensation: localizes noxious stimulus with sternal rub with LUE; noxious sensation intact throughout   Coordination: Unable to test    LABS:                        13.0   11.07 )-----------( 150      ( 26 Sep 2023 05:18 )             39.3     09-26    136  |  101  |  26<H>  ----------------------------<  267<H>  4.4   |  27  |  0.53    Ca    9.3      26 Sep 2023 05:18  Phos  4.1     09-26  Mg     2.4     09-26    TPro  6.1  /  Alb  3.0<L>  /  TBili  0.5  /  DBili  x   /  AST  45<H>  /  ALT  14  /  AlkPhos  66  09-26      Urinalysis Basic - ( 26 Sep 2023 05:18 )    Color: x / Appearance: x / SG: x / pH: x  Gluc: 267 mg/dL / Ketone: x  / Bili: x / Urobili: x   Blood: x / Protein: x / Nitrite: x   Leuk Esterase: x / RBC: x / WBC x   Sq Epi: x / Non Sq Epi: x / Bacteria: x        RADIOLOGY & ADDITIONAL TESTS:

## 2023-09-26 NOTE — PROGRESS NOTE ADULT - ASSESSMENT
89yo Female with PMHX of HTN, HLD, T2DM, and TIA (2.5 weeks ago; pt with L sided weakness, was admitted to [facility] and discharged on ASA without residual deficits) BIBEMS to [facility] ED as transfer from [facility] for L MCA stroke as possible thrombectomy candidate. Pt intubated at [facility] and found to hyperdense L MCA on noncon CTH. NIHSS 32 on arrival. CTH with evidence of acute L MCA infarct, CTP with mismatch volume of 238cc with mismatch ratio 6.8 within L MCA territory, CTA H/N with left MCA bifurcation occlusion, free air in right carotid deep spaces likely due to traumatic intubation. Pt out of window for thrombolytic treatment. After discussion between Dr. Ontiveros and Dr. Plascencia, patient was deemed not a candidate for mechanical thrombectomy given ischemic changes along L MCA territory on CTH with large correlating core on CTP. Course complicated by new-onset afib with RVR throughout hospitalization, cytotoxic swelling and pneumonia. Last CTH on 9/20 showing improvement in mass effect and no hemorrhagic transformation. Pt on ASA, remains intubated in ICU with no sedation; prolonged EEG on ,no seizures, EEG removed. Exam is poor but with slight improvement in motor exam, still severely disabled. Overall, suspect stroke is secondary to new-onset a fib detected upon admission, on asa; holding a/c given high risk of hemorrhagic conversion. Suspect severe disability and overall poor prognosis but family wishes to allow for more time. Palliative care onboard, GOC discussion being had, however at this time, family needs more time before any making any decisions. Pt remains full code. Family meeting planned for 9/27 to discuss PEG/Trach.     Stroke etiology likely embolic source (cardioembolic vs hypercoagulable state), fetal PCA noted on CTA possibly explaining multiple territory CVA    Recommendations:  1)Secondary stroke prevention  - Continue ASA 81mg and Atorvastatin 80mg daily   - Tentative plan to start AC 9/29 (14 days from stroke)    2) Stroke risk factors  - A1c: 6.4  - LDL: 72  - Afib with RVR with no on AC    3) Further management  - Continue to provide ongoing support to family during GOC discussions   - GOC meeting with family tomorrow, 9/27  - recommend SBP goal <180  - continue q4 neuro checks and vitals   - provide stroke education  - ok to start DVT ppx stroke standpoint    Discussed Neurology Attending Dr. Whitaker

## 2023-09-26 NOTE — PROGRESS NOTE ADULT - ASSESSMENT
88F with PMHx of HTN, HLD, T2DM presents to [facility] as a tx from [facility] i/s/o concern for large L sided MCA stroke. ICU consulted for further management.     Neuro  #MCA Stroke  #MCA Thrombus  Pt intubated @ [facility] i/s/o airway protection for suspected large L MCA stroke. CT brain stroke protocol showing opacified b/l MCAs L>R, cannot exclude thrombus. Per Neurosurgical team, CT brain perfusion showing infarction around MCA territory which is possibly too substantial to allow for adequate brain recovery even if intervention is done on thrombus.  Per Neurosurg, no plan for intervention on thrombus and MCA stroke expected to enlarge with larger infarction to be expected with possible cerebral edema however no edema witnessed on scans at this time.     - Patient weaned off sedation  - strict BP control w/ SBP <180  - Na goal 145-155 i/s/o stroke -- Na of 155 today   - No ASA per neurosurgery  - Palliative consulted i/s/o possible worsening infarction with no intervention -- family support needed- plan for next family meeting on 09/27  - vEEG with no seizure activity. Keppra discontinued as per neuro recs  - Continue aspirin    Cards  #Afib  New onset Afib with HR 120s. Patient bradycardiac over last few days, corresponding with initiation of lopressor on 09/22. EKG revealing sinus bradycardia.   - Discontinue lopressor 12.5 mg PO through NG tube BID  - Continue to monitor heart rate    #Hx of HTN  #BP Control  Will exhibit strict BP control i/s/o stroke. BPs in ED labile ranging from -190s.   - Discontinue lopressor in setting of bradycardia  - Monitor BP    Respiratory  #Acute Hypoxic Respiratory Failure i/s/o MCA Stroke w/ Asp Pna   CT Chest showing signs of possible aspiration pna i/s/o intubation. WBC 12 however likely reactive i/s/o stroke. Afebrile.   - aspiration precautions   -Completed 4-day course of Ertapenam 1000mg IVP for 4 days (09/20-09/23)    Renal  #Permissive Hypernatremia i/s/o MCA Infarct   Na 136 on admission. Per protocol, NaCl 3% 100cc bolus.   -Na at goal 145-155    #Indwelling mathew  - monitor urine output -- placed in MICU    Heme  #Elevated Hgb  HGB 15 on admission likely i/s/o hemoconcentration.   - ctm  - trend CBC    Endo  #Hx of T2DM  Hx of T2DM. A1c and home medications unknown.  - f/u A1c in AM --> 6.4  - started Kaiden     ID  See pulm    Prophylaxis:  - DVT: SCDs   - GI: none    88F with PMHx of HTN, HLD, T2DM presents to [facility] as a tx from [facility] i/s/o concern for large L sided MCA stroke. ICU consulted for further management.     Neuro  #MCA Stroke  #MCA Thrombus  Pt intubated @ [facility] i/s/o airway protection for suspected large L MCA stroke. CT brain stroke protocol showing opacified b/l MCAs L>R, cannot exclude thrombus. Per Neurosurgical team, CT brain perfusion showing infarction around MCA territory which is possibly too substantial to allow for adequate brain recovery even if intervention is done on thrombus.  Per Neurosurg, no plan for intervention on thrombus and MCA stroke expected to enlarge with larger infarction to be expected with possible cerebral edema however no edema witnessed on scans at this time.  - Continue to provide ongoing support to family during GOC discussions   - GOC meeting with family tomorrow, 9/27  - recommend SBP goal <180  - continue q4 neuro checks and vitals   - provide stroke education  - ok to start DVT ppx stroke standpoint     #Secondary stroke prevention  - Continue ASA 81mg and Atorvastatin 80mg daily   - Tentative plan to start AC 9/29 (14 days from stroke)    Cards  #Afib  New onset Afib with HR 120s. Patient bradycardiac over last few days, corresponding with initiation of lopressor on 09/22. EKG revealing sinus bradycardia.   - Discontinue lopressor 12.5 mg PO through NG tube BID  - Continue to monitor heart rate    #Hx of HTN  #BP Control  Will exhibit strict BP control i/s/o stroke. BPs in ED labile ranging from -190s.   - Discontinue lopressor in setting of bradycardia  - Monitor BP    Respiratory  #Acute Hypoxic Respiratory Failure i/s/o MCA Stroke w/ Asp Pna   CT Chest showing signs of possible aspiration pna i/s/o intubation. WBC 12 however likely reactive i/s/o stroke. Afebrile.   - aspiration precautions   -Completed 4-day course of Ertapenam 1000mg IVP for 4 days (09/20-09/23)    Renal  #Permissive Hypernatremia i/s/o MCA Infarct   Na 136 on admission. Per protocol, NaCl 3% 100cc bolus.   -Na at goal 145-155    #Indwelling mathew  - monitor urine output -- placed in MICU    Heme  #Elevated Hgb  HGB 15 on admission likely i/s/o hemoconcentration.   - ctm  - trend CBC    Endo  #Hx of T2DM  Hx of T2DM. A1c and home medications unknown.  - f/u A1c in AM --> 6.4  - started Kaiden     ID  See pulm    Prophylaxis:  - DVT: SCDs   - GI: none

## 2023-09-26 NOTE — PROGRESS NOTE ADULT - ASSESSMENT
87yo female with PMHx of HTN, HLD, T2DM presents to [facility] as a tx from [facility] i/s/o concern for large L sided MCA stroke. ICU consulted for further management.

## 2023-09-26 NOTE — PROGRESS NOTE ADULT - PROBLEM SELECTOR PLAN 4
Following for GOC. Will continue to follow.    Saima Knapp MD  Palliative Care Attending  Geriatrics and Palliative Consult Service.

## 2023-09-27 NOTE — PROGRESS NOTE ADULT - NS ATTEND AMEND GEN_ALL_CORE FT
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 Due to large core size and poor functional status, patient was not deemed an appropriate candidate for thrombectomy. Most recent repeat HCT shows L MCA and PCA infarction with less edema/shift, now off hyperosmolar/hypertonic therapy. She remains intubated, off sedation, in the ICU. Exam is poor but with slight improvement in motor exam, still severely disabled.  No seizures, off Keppra. Will d/c EEG. Overall, suspect stroke is secondary to new-onset a fib detected upon admission, on asa; holding a/c given high risk of hemorrhagic conversion. Suspect severe disability and overall poor prognosis but family wishes to allow for more time.
88 year old woman w/ PMH Of HTN, HLD, DM, recent ischemic stroke s/ residual deficitis presented w/ L. MCA syndrome secondary to L. M1 occlusion, no MT performed given large ischemic core and baseline functioning status. Found to have new onset Afib.     On exam, eyes closed, opens to noxious stimuli, no verbal output, follows minimal commands, RUE trace movement.     CTH 9.20.23 demonstrates a large L. MCA territory infarction w/ 6mm L to R shift.   CTA h/n 9.16.23 L. Distal M1 occlusion; Otherwise no hemodynamically significant stenosis.   Carotid dopplers negative  A1c 6.4  LDL 72  TTE EF 55%; Normal LA;   EEG L. Focal slowing; No definitive epileptiform activity     Impression: L. M1 occlusion; Mechanism of stroke secondary to cardioembolism related to Afib.     Plan:   Continue ASA 81mg for secondary stroke prevention for now; Can start Eliquis 14 days from stroke (September 29th) if in line with [facility]  Stroke team attended [facility] meeting today - family electing palliative.   PT/OT     DIET - NPO w/ NGT     50 minutes spent on total encounter. The necessity of the time spent during the encounter on this date of service was due to:     Review of imaging and chart; obtaining history; examination of pt;
88 year old woman w/ PMH Of HTN, HLD, DM, recent ischemic stroke s/ residual deficitis presented w/ L. MCA syndrome secondary to L. M1 occlusion, no MT performed given large ischemic core and baseline functioning status. Found to have new onset Afib.     On exam, eyes closed, opens to noxious stimuli, no verbal output, follows minimal commands, RUE trace movement.     CTH 9.20.23 demonstrates a large L. MCA territory infarction w/ 6mm L to R shift.   CTA h/n 9.16.23 L. Distal M1 occlusion; Otherwise no hemodynamically significant stenosis.   Carotid dopplers negative  A1c 6.4  LDL 72  TTE EF 55%; Normal LA;   EEG L. Focal slowing; No definitive epileptiform activity     Impression: L. M1 occlusion; Mechanism of stroke secondary to cardioembolism related to Afib.     Plan:   Continue ASA 81mg for secondary stroke prevention for now; Plan to start Eliquis 14 days from stroke (September 29th).   Plan for GOC meeting with family Wednesday   PT/OT     DIET - NPO w/ NGT     50 minutes spent on total encounter. The necessity of the time spent during the encounter on this date of service was due to:     Review of imaging and chart; obtaining history; examination of pt;
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 Due to large core size and poor functional status, patient was not deemed an appropriate candidate for thrombectomy. Repeat HCT shows L MCA and PCA infarction with edema/shift, now improved, off hypertonic/hyperosmolar therapy. She remains intubated, off sedation, in the ICU. Exam is poor but stable. Prognosis is poor. Overall, suspect stroke is secondary to new-onset a fib detected upon admission. Will start ASA 81 mg daily. Ideally, would need a/c but given large stroke, will hold off for now. EEG in place while weaning Keppra and to r/o seizure as potential contributor to poor exam. Ongoing C discussions with family. Appreciate [facility] care assistance.
88 year old woman w/ PMH Of HTN, HLD, DM, recent ischemic stroke s/ residual deficitis presented w/ L. MCA syndrome secondary to L. M1 occlusion, no MT performed given large ischemic core and baseline functioning status. Found to have new onset Afib.     CTH 9.20.23 demonstrates a large L. MCA territory infarction w/ 6mm L to R shift.   CTA h/n 9.16.23 L. Distal M1 occlusion; Otherwise no hemodynamically significant stenosis.   Carotid dopplers negative  A1c 6.4  LDL 72  TTE EF 55%; Normal LA;   EEG L. Focal slowing; No definitive epileptiform activity     Impression: L. M1 occlusion; Mechanism of stroke secondary to cardioembolism related to Afib.     Plan:   Continue ASA 81mg for secondary stroke prevention for now; Plan to start Eliquis 14 days from stroke (September 29th).   Plan for GOC meeting with family Wednesday   PT/OT     DIET - NPO w/ NGT     50 minutes spent on total encounter. The necessity of the time spent during the encounter on this date of service was due to:     Review of imaging and chart; obtaining history; examination of pt; discussion and coordination of care, and discussion of lifestyle modification and risk factor control.
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 Due to large core size and poor functional status, patient was not deemed an appropriate candidate for thrombectomy. Most recent repeat HCT shows L MCA and PCA infarction with less edema/shift, now off hyperosmolar/hypertonic therapy. She remains intubated, off sedation, in the ICU. Exam is poor but stable.  No seizures, off Keppra. Overall, suspect stroke is secondary to new-onset a fib detected upon admission, on asa; holding a/c given high risk of hemorrhagic conversion. Suspect severe disability and overall poor prognosis but family wishes to allow for more time.
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 Due to large core size and poor functional status, patient was not deemed an appropriate candidate for thrombectomy. Repeat HCT shows L MCA and PCA (fetal PCA) infarction. She remains intubated, off sedation, in the ICU. Overall, suspect stroke is secondary to new-onset a fib detected upon admission. For now, patient’s family would like to proceed with further eval despite overall poor prognosis. Will obtain TTE. Will eventually need a/c pending clinical course but would not start yet given size of infarct. Start aspirin pending repeat HCT, statin (via NGT). Will obtain repeat HCT and EEG given significant somnolence to r/o additional confounders. Continue hypertonic saline for now with goal 145-155. SBP<180
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 Due to large core size and poor functional status, patient was not deemed an appropriate candidate for thrombectomy. Repeat HCT shows L MCA and PCA (fetal PCA) infarction. She remains intubated, off sedation, in the ICU. Overall, suspect stroke is secondary to new-onset a fib detected upon admission. We will continue hyperosmotic/hypertonic therapy as able. Repeat BMP/osm q6h; if osm <320 (and gap is not >20), ok to give mannitol 1g/kg. Restart 3% NS if sodium <145. We will continue medical therapy for now but will continue ongoing GOC discussions with family given overall poor prognosis. Repeat HCT tomorrow.  Can start aspirin.  Now on Keppra 500 mg BID for left lip twitching; EEG not yet in place. Pall care consulted.  Continue abx for PNA.
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 Due to large core size and poor functional status, patient was not deemed an appropriate candidate for thrombectomy. Most recent repeat HCT shows L MCA and PCA infarction with less edema/shift, now off hyperosmolar/hypertonic therapy. She remains intubated, off sedation, in the ICU. Exam is poor but stable.  No seizures, off Keppra. Overall, suspect stroke is secondary to new-onset a fib detected upon admission, on asa; holding a/c given high risk of hemorrhagic conversion. Suspect severe disability and overall poor prognosis. Family meeting today. Continue abx per ICU/ID.
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 Due to large core size and poor functional status, patient was not deemed an appropriate candidate for thrombectomy. Most recent repeat HCT shows L MCA and PCA infarction with less edema/shift, now off hyperosmolar/hypertonic therapy. She remains intubated, off sedation, in the ICU. Exam is poor but stable. Overall, suspect stroke is secondary to new-onset a fib detected upon admission, on asa; holding a/c given high risk of hemorrhagic conversion. Discussed GOC with family who are not ready to discuss palliative care despite acknowledgement of severe disability and overall poor prognosis We will continue to treat her medically. Continue abx per ICU/ID. No seizures, off Keppra.

## 2023-09-27 NOTE — PROGRESS NOTE ADULT - SUBJECTIVE AND OBJECTIVE BOX
Neurology Stroke Progress Note    INTERVAL HPI/OVERNIGHT EVENTS:  Patient seen and examined. Plan for family meeting today at 1500 with palliative.     MEDICATIONS  (STANDING):  artificial tears (preservative free) Ophthalmic Solution 1 Drop(s) Both EYES daily  aspirin  chewable 81 milliGRAM(s) Oral every 24 hours  atorvastatin 80 milliGRAM(s) Oral at bedtime  chlorhexidine 0.12% Liquid 15 milliLiter(s) Oral Mucosa every 12 hours  chlorhexidine 2% Cloths 1 Application(s) Topical <User Schedule>  dextrose 5%. 1000 milliLiter(s) (50 mL/Hr) IV Continuous <Continuous>  dextrose 5%. 1000 milliLiter(s) (100 mL/Hr) IV Continuous <Continuous>  dextrose 50% Injectable 25 Gram(s) IV Push once  dextrose 50% Injectable 25 Gram(s) IV Push once  dextrose 50% Injectable 12.5 Gram(s) IV Push once  glucagon  Injectable 1 milliGRAM(s) IntraMuscular once  insulin lispro (ADMELOG) corrective regimen sliding scale   SubCutaneous every 6 hours  metoprolol tartrate 12.5 milliGRAM(s) Oral every 12 hours    MEDICATIONS  (PRN):  dextrose Oral Gel 15 Gram(s) Oral once PRN Blood Glucose LESS THAN 70 milliGRAM(s)/deciliter      Allergies    No Known Allergies    Intolerances        Vital Signs Last 24 Hrs  T(C): 37.2 (27 Sep 2023 09:52), Max: 37.3 (26 Sep 2023 14:00)  T(F): 99 (27 Sep 2023 09:52), Max: 99.1 (26 Sep 2023 14:00)  HR: 86 (27 Sep 2023 10:00) (81 - 110)  BP: 102/79 (27 Sep 2023 10:00) (78/50 - 120/66)  BP(mean): 86 (27 Sep 2023 10:00) (60 - 86)  RR: 15 (27 Sep 2023 10:00) (15 - 25)  SpO2: 100% (27 Sep 2023 10:00) (99% - 100%)    Parameters below as of 27 Sep 2023 10:00  Patient On (Oxygen Delivery Method): ventilator    O2 Concentration (%): 30    Physical exam:  General: lying in bed, eyes closed, intubated not on sedation  Eyes: Anicteric sclerae  Neck: trachea midline; ET tube in place  Cardiovascular: Afib on monitor  Pulmonary: No use of accessory muscles  Extremities: no edema    Neurologic:  -Mental status: AAOx0; intubated, no sedation; delay eye opening when noxious stimuli applied, does not follow commands  -Cranial nerves:   II: does not blink to threat in right eye. blinks to threat with left eye  III, IV, VI: Pupils equally round and reactive to light; appears to have a left gaze preference  V:  Unable to test facial sensation  VII:  Right facial droop   VIII: Unable to test hearing  Motor: Right upper extremity 2/5, right lower extremity with triple flexion, 1/5. Left upper extremity 3/5, left lower extremity 2/5.  Sensation: localizes noxious stimulus with sternal rub with LUE; noxious sensation intact throughout   Coordination: Unable to test      LABS:                        12.4   12.37 )-----------( 176      ( 27 Sep 2023 03:38 )             36.3     09-27    135  |  100  |  33<H>  ----------------------------<  267<H>  4.5   |  29  |  0.58    Ca    9.2      27 Sep 2023 03:38  Phos  4.2     09-27  Mg     2.3     09-27    TPro  5.8<L>  /  Alb  3.1<L>  /  TBili  0.5  /  DBili  x   /  AST  46<H>  /  ALT  15  /  AlkPhos  65  09-27      Urinalysis Basic - ( 27 Sep 2023 03:38 )    Color: x / Appearance: x / SG: x / pH: x  Gluc: 267 mg/dL / Ketone: x  / Bili: x / Urobili: x   Blood: x / Protein: x / Nitrite: x   Leuk Esterase: x / RBC: x / WBC x   Sq Epi: x / Non Sq Epi: x / Bacteria: x        RADIOLOGY & ADDITIONAL TESTS:

## 2023-09-27 NOTE — PROGRESS NOTE ADULT - SUBJECTIVE AND OBJECTIVE BOX
Patient is a 88y old  Female who presents with a chief complaint of stroke (23 Sep 2023 13:53)    INTERVAL HPI/OVERNIGHT EVENTS: NAEON. Ordered oral care. 300 cc urine output via primafit.     SUBJECTIVE: Patient seen and examined at bedside. AAOx0.     Drips: N/A  Feeds: Jevity 1.2 56 cc/hr   Access: L FA 20g 09/22    Vent: CPAP- RR 16/30% FiO2/PEEP 5/    ICU Vital Signs Last 24 Hrs  ICU Vital Signs Last 24 Hrs  T(C): 37.2 (27 Sep 2023 01:05), Max: 37.3 (26 Sep 2023 14:00)  T(F): 99 (27 Sep 2023 01:05), Max: 99.1 (26 Sep 2023 14:00)  HR: 86 (27 Sep 2023 06:00) (75 - 110)  BP: 95/64 (27 Sep 2023 06:00) (80/53 - 120/66)  BP(mean): 74 (27 Sep 2023 06:00) (61 - 84)  ABP: --  ABP(mean): --  RR: 16 (27 Sep 2023 06:00) (16 - 25)  SpO2: 100% (27 Sep 2023 06:00) (99% - 100%)    O2 Parameters below as of 27 Sep 2023 06:00  Patient On (Oxygen Delivery Method): ventilator    O2 Concentration (%): 30    I&O's Detail    25 Sep 2023 07:01  -  26 Sep 2023 07:00  --------------------------------------------------------  IN:    Enteral Tube Flush: 90 mL    Free Water: 1000 mL    IV PiggyBack: 5 mL    Jevity 1.2: 840 mL  Total IN: 1935 mL    OUT:    Incontinent per Collection Bag (mL): 2350 mL  Total OUT: 2350 mL    Total NET: -415 mL      26 Sep 2023 07:01  -  27 Sep 2023 06:57  --------------------------------------------------------  IN:    Enteral Tube Flush: 120 mL    Free Water: 250 mL    Jevity 1.2: 784 mL  Total IN: 1154 mL    OUT:    Incontinent per Collection Bag (mL): 700 mL  Total OUT: 700 mL    Total NET: 454 mL    LABS:                          12.4   12.37 )-----------( 176      ( 27 Sep 2023 03:38 )             36.3     09-27    135  |  100  |  33<H>  ----------------------------<  267<H>  4.5   |  29  |  0.58    Ca    9.2      27 Sep 2023 03:38  Phos  4.2     09-27  Mg     2.3     09-27    TPro  5.8<L>  /  Alb  3.1<L>  /  TBili  0.5  /  DBili  x   /  AST  46<H>  /  ALT  15  /  AlkPhos  65  09-27      Urinalysis Basic - ( 27 Sep 2023 03:38 )    Color: x / Appearance: x / SG: x / pH: x  Gluc: 267 mg/dL / Ketone: x  / Bili: x / Urobili: x   Blood: x / Protein: x / Nitrite: x   Leuk Esterase: x / RBC: x / WBC x   Sq Epi: x / Non Sq Epi: x / Bacteria: x    RADIOLOGY, EKG & ADDITIONAL TESTS: Reviewed.     Consultant(s) Notes Reviewed:  [x ] YES  [ ] NO    MEDICATIONS  (STANDING):  aspirin  chewable 81 milliGRAM(s) Oral every 24 hours  atorvastatin 80 milliGRAM(s) Oral at bedtime  chlorhexidine 0.12% Liquid 15 milliLiter(s) Oral Mucosa every 12 hours  chlorhexidine 2% Cloths 1 Application(s) Topical <User Schedule>  dextrose 5%. 1000 milliLiter(s) (100 mL/Hr) IV Continuous <Continuous>  dextrose 5%. 1000 milliLiter(s) (50 mL/Hr) IV Continuous <Continuous>  dextrose 50% Injectable 25 Gram(s) IV Push once  dextrose 50% Injectable 25 Gram(s) IV Push once  dextrose 50% Injectable 12.5 Gram(s) IV Push once  ertapenem  IVPB 1000 milliGRAM(s) IV Intermittent every 24 hours  glucagon  Injectable 1 milliGRAM(s) IntraMuscular once  insulin lispro (ADMELOG) corrective regimen sliding scale   SubCutaneous every 6 hours  metoprolol tartrate 12.5 milliGRAM(s) Oral two times a day    MEDICATIONS  (PRN):  dextrose Oral Gel 15 Gram(s) Oral once PRN Blood Glucose LESS THAN 70 milliGRAM(s)/deciliter      PHYSICAL EXAM:  General: thin; frail  HEENT: NC/AT; PERRL  Neck: intubated   Cardiovascular: Irregular rate w/ occasional rapid rhythm +S1/S2; NO M/R/G  Respiratory: CTA B/L; no W/R/R  Gastrointestinal: soft, NT/ND; +BSx4  Extremities: WWP; no edema or cyanosis; warm upper extremities with palpable radial pulses, cooler lower extremities but pulses still palpable  Vascular: 2+ radial, DP/PT pulses B/L  Neurological: AAOx0; intubated. Slight Upgoing Babinski noted    Care Discussed with Consultants/Other Providers [ x] YES  [ ] NO Patient is a 88y old  Female who presents with a chief complaint of stroke (23 Sep 2023 13:53)    INTERVAL HPI/OVERNIGHT EVENTS: NAEON. Ordered oral care. 300 cc urine output via primafit.     SUBJECTIVE: Patient seen and examined at bedside. AAOx0.     Drips: N/A  Feeds: Jevity 1.2 56 cc/hr   Access: L FA 20g 09/22    Vent: CPAP- RR 16/30% FiO2/PEEP 5/    ICU Vital Signs Last 24 Hrs  ICU Vital Signs Last 24 Hrs  T(C): 37.2 (27 Sep 2023 01:05), Max: 37.3 (26 Sep 2023 14:00)  T(F): 99 (27 Sep 2023 01:05), Max: 99.1 (26 Sep 2023 14:00)  HR: 86 (27 Sep 2023 06:00) (75 - 110)  BP: 95/64 (27 Sep 2023 06:00) (80/53 - 120/66)  BP(mean): 74 (27 Sep 2023 06:00) (61 - 84)  ABP: --  ABP(mean): --  RR: 16 (27 Sep 2023 06:00) (16 - 25)  SpO2: 100% (27 Sep 2023 06:00) (99% - 100%)    O2 Parameters below as of 27 Sep 2023 06:00  Patient On (Oxygen Delivery Method): ventilator    O2 Concentration (%): 30    I&O's Detail    26 Sep 2023 07:01  -  27 Sep 2023 06:57  --------------------------------------------------------  IN:    Enteral Tube Flush: 120 mL    Free Water: 250 mL    Jevity 1.2: 784 mL  Total IN: 1154 mL    OUT:    Incontinent per Collection Bag (mL): 700 mL  Total OUT: 700 mL    Total NET: 454 mL    LABS:                          12.4   12.37 )-----------( 176      ( 27 Sep 2023 03:38 )             36.3     09-27    135  |  100  |  33<H>  ----------------------------<  267<H>  4.5   |  29  |  0.58    Ca    9.2      27 Sep 2023 03:38  Phos  4.2     09-27  Mg     2.3     09-27    TPro  5.8<L>  /  Alb  3.1<L>  /  TBili  0.5  /  DBili  x   /  AST  46<H>  /  ALT  15  /  AlkPhos  65  09-27      Urinalysis Basic - ( 27 Sep 2023 03:38 )    Color: x / Appearance: x / SG: x / pH: x  Gluc: 267 mg/dL / Ketone: x  / Bili: x / Urobili: x   Blood: x / Protein: x / Nitrite: x   Leuk Esterase: x / RBC: x / WBC x   Sq Epi: x / Non Sq Epi: x / Bacteria: x    RADIOLOGY, EKG & ADDITIONAL TESTS: Reviewed.     Consultant(s) Notes Reviewed:  [x ] YES  [ ] NO    MEDICATIONS  (STANDING):  aspirin  chewable 81 milliGRAM(s) Oral every 24 hours  atorvastatin 80 milliGRAM(s) Oral at bedtime  chlorhexidine 0.12% Liquid 15 milliLiter(s) Oral Mucosa every 12 hours  chlorhexidine 2% Cloths 1 Application(s) Topical <User Schedule>  dextrose 5%. 1000 milliLiter(s) (100 mL/Hr) IV Continuous <Continuous>  dextrose 5%. 1000 milliLiter(s) (50 mL/Hr) IV Continuous <Continuous>  dextrose 50% Injectable 25 Gram(s) IV Push once  dextrose 50% Injectable 25 Gram(s) IV Push once  dextrose 50% Injectable 12.5 Gram(s) IV Push once  ertapenem  IVPB 1000 milliGRAM(s) IV Intermittent every 24 hours  glucagon  Injectable 1 milliGRAM(s) IntraMuscular once  insulin lispro (ADMELOG) corrective regimen sliding scale   SubCutaneous every 6 hours  metoprolol tartrate 12.5 milliGRAM(s) Oral two times a day    MEDICATIONS  (PRN):  dextrose Oral Gel 15 Gram(s) Oral once PRN Blood Glucose LESS THAN 70 milliGRAM(s)/deciliter      PHYSICAL EXAM:  General: thin; frail  HEENT: NC/AT; PERRL  Neck: intubated   Cardiovascular: Irregular rate w/ occasional rapid rhythm +S1/S2; NO M/R/G  Respiratory: CTA B/L; no W/R/R  Gastrointestinal: soft, NT/ND; +BSx4  Extremities: WWP; no edema or cyanosis; warm upper extremities with palpable radial pulses, cooler lower extremities but pulses still palpable  Vascular: 2+ radial, DP/PT pulses B/L  Neurological: AAOx0; intubated. Slight Upgoing Babinski noted    Care Discussed with Consultants/Other Providers [ x] YES  [ ] NO

## 2023-09-27 NOTE — PROGRESS NOTE ADULT - ASSESSMENT
88F with PMHx of HTN, HLD, T2DM presents to [facility] as a tx from [facility] i/s/o concern for large L sided MCA stroke. ICU consulted for further management.     Neuro  #MCA Stroke  #MCA Thrombus  Pt intubated @ [facility] i/s/o airway protection for suspected large L MCA stroke. CT brain stroke protocol showing opacified b/l MCAs L>R, cannot exclude thrombus. Per Neurosurgical team, CT brain perfusion showing infarction around MCA territory which is possibly too substantial to allow for adequate brain recovery even if intervention is done on thrombus.  Per Neurosurg, no plan for intervention on thrombus and MCA stroke expected to enlarge with larger infarction to be expected with possible cerebral edema however no edema witnessed on scans at this time.  - Continue to provide ongoing support to family during GOC discussions   - GOC meeting with family tomorrow, 9/27  - recommend SBP goal <180  - continue q4 neuro checks and vitals   - provide stroke education  - ok to start DVT ppx stroke standpoint     #Secondary stroke prevention  - Continue ASA 81mg and Atorvastatin 80mg daily   - Tentative plan to start AC 9/29 (14 days from stroke)    Cards  #Afib  New onset Afib with HR 120s. Patient bradycardiac over last few days, corresponding with initiation of lopressor on 09/22. EKG revealing sinus bradycardia.   - Discontinue lopressor 12.5 mg PO through NG tube BID  - Continue to monitor heart rate    #Hx of HTN  #BP Control  Will exhibit strict BP control i/s/o stroke. BPs in ED labile ranging from -190s.   - Discontinue lopressor in setting of bradycardia  - Monitor BP    Respiratory  #Acute Hypoxic Respiratory Failure i/s/o MCA Stroke w/ Asp Pna   CT Chest showing signs of possible aspiration pna i/s/o intubation. WBC 12 however likely reactive i/s/o stroke. Afebrile.   - aspiration precautions   -Completed 4-day course of Ertapenam 1000mg IVP for 4 days (09/20-09/23)    Renal  #Permissive Hypernatremia i/s/o MCA Infarct   Na 136 on admission. Per protocol, NaCl 3% 100cc bolus.   -Na at goal 145-155    #Indwelling mathew  - monitor urine output -- placed in MICU    Heme  #Elevated Hgb  HGB 15 on admission likely i/s/o hemoconcentration.   - ctm  - trend CBC    Endo  #Hx of T2DM  Hx of T2DM. A1c and home medications unknown.  - f/u A1c in AM --> 6.4  - started Kaiden     ID  See pulm    Prophylaxis:  - DVT: SCDs   - GI: none    88F with PMHx of HTN, HLD, T2DM presents to [facility] as a tx from [facility] i/s/o concern for large L sided MCA stroke. ICU consulted for further management.     Neuro  #MCA Stroke  #MCA Thrombus  Pt intubated @ [facility] i/s/o airway protection for suspected large L MCA stroke. CT brain stroke protocol showing opacified b/l MCAs L>R, cannot exclude thrombus. Per Neurosurgical team, CT brain perfusion showing infarction around MCA territory which is possibly too substantial to allow for adequate brain recovery even if intervention is done on thrombus.  Per Neurosurg, no plan for intervention on thrombus and MCA stroke expected to enlarge with larger infarction to be expected with possible cerebral edema however no edema witnessed on scans at this time.  - Continue to provide ongoing support to family during GOC discussions   - GOC meeting with family today 9/27 @ 3pm  - recommend SBP goal <180  - continue q4 neuro checks and vitals   - provide stroke education  - ok to start DVT ppx stroke standpoint     #Secondary stroke prevention  - Continue ASA 81mg and Atorvastatin 80mg daily   - Tentative plan to start AC 9/29 (14 days from stroke)    Cards  #Afib  New onset Afib with HR 120s. Patient bradycardiac over last few days, corresponding with initiation of lopressor on 09/22. EKG revealing sinus bradycardia.   - Discontinue lopressor 12.5 mg PO through NG tube BID  - Continue to monitor heart rate    #Hx of HTN  #BP Control  Will exhibit strict BP control i/s/o stroke. BPs in ED labile ranging from -190s.   - Continue home dose lopressor   - Monitor BP    Respiratory  #Acute Hypoxic Respiratory Failure i/s/o MCA Stroke w/ Asp Pna   CT Chest showing signs of possible aspiration pna i/s/o intubation. WBC 12 however likely reactive i/s/o stroke. Afebrile.   - aspiration precautions   -Completed 4-day course of Ertapenam 1000mg IVP for 4 days (09/20-09/23)    Renal  #Permissive Hypernatremia i/s/o MCA Infarct   Na 136 on admission. Per protocol, NaCl 3% 100cc bolus.   -Na at goal 145-155    Heme  #Elevated Hgb  HGB 15 on admission likely i/s/o hemoconcentration.   - ctm  - trend CBC    Endo  #Hx of T2DM  Hx of T2DM. A1c and home medications unknown.  - continue Kaiden     ID  See pulm    Prophylaxis:  - DVT: SCDs   - GI: none

## 2023-09-27 NOTE — PROGRESS NOTE ADULT - ASSESSMENT
87yo Female with PMHX of HTN, HLD, T2DM, and TIA (2.5 weeks ago; pt with L sided weakness, was admitted to [place] and discharged on ASA without residual deficits) BIBEMS to [facility] ED as transfer from [facility] for L MCA stroke as possible thrombectomy candidate. Pt intubated at [place] and found to hyperdense L MCA on noncon CTH. NIHSS 32 on arrival. CTH with evidence of acute L MCA infarct, CTP with mismatch volume of 238cc with mismatch ratio 6.8 within L MCA territory, CTA H/N with left MCA bifurcation occlusion, free air in right carotid deep spaces likely due to traumatic intubation. Pt out of window for thrombolytic treatment. After discussion between Dr. Ontiveros and Dr. Plascencia, patient was deemed not a candidate for mechanical thrombectomy given ischemic changes along L MCA territory on CTH with large correlating core on CTP. Course complicated by new-onset afib with RVR throughout hospitalization, cytotoxic swelling and pneumonia. Last CTH on 9/20 showing improvement in mass effect and no hemorrhagic transformation. Pt on ASA, remains intubated in ICU with no sedation; prolonged EEG on ,no seizures, EEG removed. Exam is poor but with slight improvement in motor exam, still severely disabled. Overall, suspect stroke is secondary to new-onset a fib detected upon admission, on asa; holding a/c given high risk of hemorrhagic conversion. Suspect severe disability and overall poor prognosis but family wishes to allow for more time. Palliative care onboard, GOC discussion being had, however at this time, family needs more time before any making any decisions. Pt remains full code. Family meeting planned for 9/27 to discuss PEG/Trach.     Stroke etiology likely embolic source (cardioembolic vs hypercoagulable state), fetal PCA noted on CTA possibly explaining multiple territory CVA    Recommendations:  1)Secondary stroke prevention  - Continue ASA 81mg and Atorvastatin 80mg daily   - Tentative plan to start AC 9/29 (14 days from stroke)    2) Stroke risk factors  - A1c: 6.4  - LDL: 72  - Afib with RVR with no on AC    3) Further management  - Continue to provide ongoing support to family during GOC discussions   - GOC meeting with family today  - EEG  left focal slowing, no definitive epileptiform activity   - recommend SBP goal <180  - continue q4 neuro checks and vitals   - provide stroke education  - ok to start DVT ppx stroke standpoint    Discussed Neurology Attending Dr. Whitaker

## 2023-09-28 NOTE — PROGRESS NOTE ADULT - PROBLEM SELECTOR PROBLEM 1
Arterial ischemic stroke, MCA (middle cerebral artery), left, acute
Arterial ischemic stroke, MCA (middle cerebral artery), left, acute

## 2023-09-28 NOTE — CHART NOTE - NSCHARTNOTEFT_GEN_A_CORE
Admitting Diagnosis:   Patient is a 88y old  Female who presents with a chief complaint of stroke (25 Sep 2023 07:20)      PAST MEDICAL & SURGICAL HISTORY:    Current Nutrition Order:   Diet, NPO with Tube Feed:   Tube Feeding Modality: Nasogastric  Jevity 1.2 Moses (JEVITY1.2RTH)  Total Volume for 24 Hours (mL): 1008  Total Number of Cans: 5  Continuous  Until Goal Tube Feed Rate (mL per Hour): 56  Tube Feed Duration (in Hours): 18  Tube Feed Start Time: 11:00  Tube Feed Stop Time: 05:00  Volume Based Feeding Titration:  If the patient has achieved and tolerated the prescribed goal rate and tube feeding has been held within a 24hr period, titrate tube feeding rate based on guidelines, up to a maximum rate of 120mL/hr  Liquid Protein Supplement     Qty per Day:  1 (09-20-23 @ 13:05)    PO Intake: N/A    GI Issues: Abdomen non-distended/non-tender, +BS x4, last bowel movement 9/18    Pain: 0 per chart review    Skin Integrity: Warm/Dry/Intact, no edema noted     Labs:   09-25    138  |  102  |  35<H>  ----------------------------<  255<H>  4.4   |  29  |  0.58    Ca    8.9      25 Sep 2023 06:26  Phos  4.2     09-25  Mg     2.4     09-25    TPro  5.4<L>  /  Alb  2.8<L>  /  TBili  0.4  /  DBili  x   /  AST  39  /  ALT  11  /  AlkPhos  56  09-25    CAPILLARY BLOOD GLUCOSE      POCT Blood Glucose.: 163 mg/dL (25 Sep 2023 11:30)  POCT Blood Glucose.: 253 mg/dL (25 Sep 2023 06:02)  POCT Blood Glucose.: 208 mg/dL (24 Sep 2023 22:57)  POCT Blood Glucose.: 199 mg/dL (24 Sep 2023 18:06)      Medications:  MEDICATIONS  (STANDING):  artificial tears (preservative free) Ophthalmic Solution 1 Drop(s) Both EYES daily  aspirin  chewable 81 milliGRAM(s) Oral every 24 hours  atorvastatin 80 milliGRAM(s) Oral at bedtime  chlorhexidine 0.12% Liquid 15 milliLiter(s) Oral Mucosa every 12 hours  chlorhexidine 2% Cloths 1 Application(s) Topical <User Schedule>  dextrose 5%. 1000 milliLiter(s) (50 mL/Hr) IV Continuous <Continuous>  dextrose 5%. 1000 milliLiter(s) (100 mL/Hr) IV Continuous <Continuous>  dextrose 50% Injectable 12.5 Gram(s) IV Push once  dextrose 50% Injectable 25 Gram(s) IV Push once  dextrose 50% Injectable 25 Gram(s) IV Push once  glucagon  Injectable 1 milliGRAM(s) IntraMuscular once  insulin lispro (ADMELOG) corrective regimen sliding scale   SubCutaneous every 6 hours    MEDICATIONS  (PRN):  dextrose Oral Gel 15 Gram(s) Oral once PRN Blood Glucose LESS THAN 70 milliGRAM(s)/deciliter    Height for BMI (CENTIMETERS)	152.4 Centimeter(s)  Weight for BMI (lbs)	112.4 lb  Weight for BMI (kg)	51 kg  Body Mass Index	21.9    Weight Change: No new wt since admit.     Estimated energy needs:   Weight used for calculations	IBW  Estimated Energy Needs Weight (lbs)	100.3 lb  Estimated Energy Needs Weight (kg)	45.5 kg  Estimated Energy Needs From (moses/kg)	25  Estimated Energy Needs To (moses/kg)	30  Estimated Energy Needs Calculated From (moses/kg)	1137  Estimated Energy Needs Calculated To (moses/kg)	1365  Weight used for calculations	IBW  Estimated Protein Needs Weight (lbs)	100.3 lb  Estimated Protein Needs Weight (kg)	45.5 kg  Estimated Protein Needs From (g/kg)	1.4  Estimated Protein Needs To (g/kg)	1.6  Estimated Protein Needs Calculated From (g/kg)	63.7  Estimated Protein Needs Calculated To (g/kg)	72.8  Estimated Fluid Needs Weight (lbs)	100.3 lb  Estimated Fluid Needs Weight (kg)	45.5 kg  Estimated Fluid Needs From (ml/kg)	25  Estimated Fluid Needs To (ml/kg)	30  Estimated Fluid Needs Calculated From (ml/kg)	1137  Estimated Fluid Needs Calculated To (ml/kg)	1365  Other Calculations	Estimated nutritional needs determined using [facility] Standards of Nutrition Care for vented pt.    Subjective: 87yo female with PMHx of HTN, HLD, T2DM presents to [facility] as a tx from [facility] i/s/o concern for large L sided MCA stroke. ICU consulted for further management.     Pt care discussed in interdisciplinary care team rounds. Rx and labs reviewed. Pt remains intubated at time of assessment; vent to VC-AC, MAP 73, no pressors, and no propofol at time of assessment. Ongoing goals of care conversations regarding pt care and potential worsening infarct; next family meeting planned for 9/27 -palliative following. Pt tolerating tube feeds otherwise with no reported concern for intolerance at this time. No other reports GI distress or further nutritional concerns at this time. RDN will continue to reassess, intervene, and monitor as appropriate.     Nutrition Diagnosis: Inadequate Oral Intake related to intubation as evidenced by need for NPO status with tube feeds.    Active [ x ]  Resolved [   ]    Goal: Pt will meet 75% or more of protein/energy needs via most appropriate route for nutrition.     Recommendations:  -Continue enteral nutrition support   *Recommend Jevity 1.2 @56 ml/hr with LPS x1/day from 0781-1342 to provide 1008 ml tube feed, 1310 calories, 71 gProt., and 813 ml free water. This is 22.5 nonprotein calories and 1.56 gProt. per kg ideal body weight 45.5 kg.   -Monitor pressor and propofol demands; adjust tube feed as necessary   -Align nutrition with goals of care at all times   -Maintain aspiration precautions at all times  -Monitor chemistry, GI function, and skin integrity    Risk Level: High [ x ] Moderate [   ] Low [   ].
Admitting Diagnosis:   Patient is a 88y old  Female who presents with a chief complaint of stroke (28 Sep 2023 06:09)      PAST MEDICAL & SURGICAL HISTORY:      Current Nutrition Order:   Diet, NPO with Tube Feed:   Tube Feeding Modality: Nasogastric  Jevity 1.2 Moses (JEVITY1.2RTH)  Total Volume for 24 Hours (mL): 1008  Total Number of Cans: 5  Continuous  Until Goal Tube Feed Rate (mL per Hour): 56  Tube Feed Duration (in Hours): 18  Tube Feed Start Time: 11:00  Tube Feed Stop Time: 05:00  Volume Based Feeding Titration:  If the patient has achieved and tolerated the prescribed goal rate and tube feeding has been held within a 24hr period, titrate tube feeding rate based on guidelines, up to a maximum rate of 120mL/hr   Frequency: Every 24 Hours  Liquid Protein Supplement     Qty per Day:  1 (09-28-23 @ 13:55)    PO Intake: N/A    GI Issues: Abdomen non-distended/non-tender, +BS x4, last bowel movement 9/28    Pain: No non-verbal indicators     Skin Integrity: Warm/Dry/Intact, +1 generalized edema     Labs:   09-28    131<L>  |  97  |  32<H>  ----------------------------<  320<H>  4.3   |  28  |  0.58    Ca    8.8      28 Sep 2023 05:23  Phos  3.8     09-28  Mg     2.2     09-28    TPro  5.8<L>  /  Alb  3.1<L>  /  TBili  0.6  /  DBili  x   /  AST  47<H>  /  ALT  16  /  AlkPhos  61  09-28    CAPILLARY BLOOD GLUCOSE      POCT Blood Glucose.: 153 mg/dL (28 Sep 2023 11:32)  POCT Blood Glucose.: 250 mg/dL (28 Sep 2023 07:21)  POCT Blood Glucose.: 261 mg/dL (27 Sep 2023 23:21)  POCT Blood Glucose.: 258 mg/dL (27 Sep 2023 17:21)      Medications:  MEDICATIONS  (STANDING):  artificial tears (preservative free) Ophthalmic Solution 1 Drop(s) Both EYES daily  aspirin  chewable 81 milliGRAM(s) Oral every 24 hours  atorvastatin 80 milliGRAM(s) Oral at bedtime  chlorhexidine 0.12% Liquid 15 milliLiter(s) Oral Mucosa every 12 hours  chlorhexidine 2% Cloths 1 Application(s) Topical <User Schedule>  dextrose 5%. 1000 milliLiter(s) (50 mL/Hr) IV Continuous <Continuous>  dextrose 5%. 1000 milliLiter(s) (100 mL/Hr) IV Continuous <Continuous>  dextrose 50% Injectable 25 Gram(s) IV Push once  dextrose 50% Injectable 25 Gram(s) IV Push once  dextrose 50% Injectable 12.5 Gram(s) IV Push once  glucagon  Injectable 1 milliGRAM(s) IntraMuscular once  insulin lispro (ADMELOG) corrective regimen sliding scale   SubCutaneous every 6 hours  metoprolol tartrate 12.5 milliGRAM(s) Oral every 12 hours  pantoprazole   Suspension 40 milliGRAM(s) Oral daily    MEDICATIONS  (PRN):  dextrose Oral Gel 15 Gram(s) Oral once PRN Blood Glucose LESS THAN 70 milliGRAM(s)/deciliter    Height for BMI (CENTIMETERS)	152.4 Centimeter(s)  Weight for BMI (lbs)	112.4 lb  Weight for BMI (kg)	51 kg  Body Mass Index	21.9    Weight Change: No new wt since admit.     Estimated energy needs:   Weight used for calculations	IBW  Estimated Energy Needs Weight (lbs)	100.3 lb  Estimated Energy Needs Weight (kg)	45.5 kg  Estimated Energy Needs From (moses/kg)	25  Estimated Energy Needs To (moses/kg)	30  Estimated Energy Needs Calculated From (moses/kg)	1137  Estimated Energy Needs Calculated To (moses/kg)	1365  Weight used for calculations	IBW  Estimated Protein Needs Weight (lbs)	100.3 lb  Estimated Protein Needs Weight (kg)	45.5 kg  Estimated Protein Needs From (g/kg)	1.4  Estimated Protein Needs To (g/kg)	1.6  Estimated Protein Needs Calculated From (g/kg)	63.7  Estimated Protein Needs Calculated To (g/kg)	72.8  Estimated Fluid Needs Weight (lbs)	100.3 lb  Estimated Fluid Needs Weight (kg)	45.5 kg  Estimated Fluid Needs From (ml/kg)	25  Estimated Fluid Needs To (ml/kg)	30  Estimated Fluid Needs Calculated From (ml/kg)	1137  Estimated Fluid Needs Calculated To (ml/kg)	1365  Other Calculations	Estimated nutritional needs determined using [facility] Standards of Nutrition Care for vented pt.    Subjective: 89yo female with PMHx of HTN, HLD, T2DM presents to [facility] as a tx from [facility] i/s/o concern for large L sided MCA stroke. ICU consulted for further management.     Pt care discussed in interdisciplinary care team rounds. Rx and labs reviewed. Pt remains intubated at time of assessment; vent to VC-AC, MAP 76, no pressors, and no propofol at time of assessment. Pt continues on enteral nutrition support via nasogastric tube; observed tube feed running at goal. Pt hyponatremic today; recently added free water flush regimen, RN requested free water flush d/c'd. Ongoing goals of care discussions; align nutrition with goals of care at all times. No other reports GI distress or further nutritional concerns at this time. RDN will continue to reassess, intervene, and monitor as appropriate.     Nutrition Diagnosis: Inadequate Oral Intake related to intubation as evidenced by need for NPO status with tube feeds.    Active [ x ]  Resolved [   ]    Goal: Pt will meet 75% or more of protein/energy needs via most appropriate route for nutrition.     Recommendations:  -Continue enteral nutrition support   *Recommend Jevity 1.2 @56 ml/hr with LPS x1/day from 5686-6689 to provide 1008 ml tube feed, 1310 calories, 71 gProt., and 813 ml free water. This is 22.5 nonprotein calories and 1.56 gProt. per kg ideal body weight 45.5 kg.   -Monitor pressor and propofol demands; adjust tube feed as necessary   -Align nutrition with goals of care at all times   -Maintain aspiration precautions at all times  -Monitor chemistry, GI function, and skin integrity    Risk Level: High [ x ] Moderate [   ] Low [   ].
Goals of care discussion with daughter, son in law, grandson and granddaughter at bedside. Patient unable to participate. Given irreversible condition and large stroke, with minimal chance for meaningful recovery, family has decided to focus on comfort. DNR/DNI. MOLST form to be filled out. Goal is to withdraw care and extubate Friday, with hope to transition to inpatient hospice at that point.
Infectious Diseases Anti-infective Approval Note    Medication: ertapenem  Dose*: 1g  Route: IV  Frequency: q24h  Duration**: 4d    *Dose may be adjusted as needed for alterations in renal function.    **Reflects duration of approval and not necessarily duration of therapy     THIS IS NOT AN INFECTIOUS DISEASES CONSULTATION
PALLIATIVE MEDICINE COORDINATION OF CARE DOCUMENTATION: [x] Inpatient Consult  Non-Face-to-Face prolonged service provided that relates to (face-to-face) care that has or will occur and ongoing patient management, including one or more of the following: -Documentation review from other physicians and health care professional services -Review of medical records and diagnostic/radiology study results -Coordination with patient's support system  ************************************************************************  Consult request for: GOC    Discussed with: Dr. De Souza (MICU Attending)    Recommendations/Events: Plan for interdisciplinary family meeting with neuro, MICU and palliative    HPI:  HPI: 89yo female with well controlled HTN, HLD and T2DM presents to ED as a tx from [facility] i/s/o suspected MCA stroke. Pt had stroke-like symptoms a couple of weeks prior with no significant deficits noted at that time and patient was DCed home on aspirin. Today, patient not answering phone and found down by neighbor in wellness check in pool of urine.  BIBEMS to [facility] where she was intubated for airway protection and then subsequently transferred to [facility] for possible neurosurgical intervention.     Per family at bedside, patient a normally active and functional and ambulating multiple city blocks at a time. Prior to stroke 2-3 weeks before, patient otherwise fully functional, living alone with well controlled comorbidities.       12 Point ROS Negative unless noted otherwise above. (16 Sep 2023 02:37)    ------------------------------------------------------------------------  MEDICATION REVIEW:  MEDICATIONS  (STANDING):  aspirin  chewable 81 milliGRAM(s) Oral every 24 hours  atorvastatin 80 milliGRAM(s) Oral at bedtime  chlorhexidine 0.12% Liquid 15 milliLiter(s) Oral Mucosa every 12 hours  chlorhexidine 2% Cloths 1 Application(s) Topical <User Schedule>  dextrose 5%. 1000 milliLiter(s) (50 mL/Hr) IV Continuous <Continuous>  dextrose 5%. 1000 milliLiter(s) (100 mL/Hr) IV Continuous <Continuous>  dextrose 50% Injectable 25 Gram(s) IV Push once  dextrose 50% Injectable 25 Gram(s) IV Push once  dextrose 50% Injectable 12.5 Gram(s) IV Push once  ertapenem  IVPB 1000 milliGRAM(s) IV Intermittent every 24 hours  glucagon  Injectable 1 milliGRAM(s) IntraMuscular once  insulin lispro (ADMELOG) corrective regimen sliding scale   SubCutaneous every 6 hours  levETIRAcetam  IVPB 250 milliGRAM(s) IV Intermittent every 12 hours    MEDICATIONS  (PRN):  dextrose Oral Gel 15 Gram(s) Oral once PRN Blood Glucose LESS THAN 70 milliGRAM(s)/deciliter    ISTOP REFERENCE:   - no active Rx's      ------------------------------------------------------------------------  COORDINATION OF CARE:  - Palliative Care consulted for: GO  - Patient assessed: 9/19/23  - Patient previously seen by Palliative Care service: NO    ADVANCE CARE PLANNING  - Code status: Full code  - MOLST reviewed in chart: NONE; None found on [facility]  - [facility] documents: NONE found on [place]  - HCP/Living will/Other Advanced Directives in [facility]: NONE found on [facility]  ------------------------------------------------------------------------  CARE PROVIDER DOCUMENTATION:  - MALIHA/TOMAS notes: Remains medically active  - Primary Team and Consultant Notes reviewed    PLAN OF CARE  - Current Admit Date: 9/16/23  - LOS: 5 days  - Current Dispo Plan: TO BE DETERMINED    ------------------------------------------------------------------------  - Time Spent/Chart reviewed: 31 Minutes [including time used to gather, review and transfer data]  - Start: 3:00pm  - End: 3:31pm    Prolonged services rendered, as part of this patient's care provided by Palliative Medicine, include: i. chart review for provider and ancillary service documentation, ii. pertinent diagnostics including laboratory and imaging studies, iii. medication review including PRN use, iv. admission history including previous palliative care encounters and GOC notes, v. advance care planning documents including HCP and MOLST forms in [facility]. Part of Palliative Medicine extended evaluation and management also involves coordination of care with our IDT, the primary and consulting teams, and unit CM/SW and Hospice if eligible. Recommendations based on the information gathered and discussed are outlined in the A/P of Palliative notes.
87yo Female with PMHX of HTN, HLD, T2DM, and TIA (2.5 weeks ago; pt with L sided weakness, was admitted to [place] and discharged on ASA without residual deficits) BIBEMS to [facility] ED as transfer from [facility] for L MCA stroke as possible thrombectomy candidate. Pt intubated at [facility] and found to hyperdense L MCA on noncon CTH. NIHSS 32 on arrival. CTH with evidence of acute L MCA infarct, CTP with mismatch volume of 238cc with mismatch ratio 6.8 within L MCA territory, CTA H/N with left MCA bifurcation occlusion, free air in right carotid deep spaces likely due to traumatic intubation. Pt out of window for thrombolytic treatment. After discussion between Dr. Ontiveros and Dr. Plascencia, patient was deemed not a candidate for mechanical thrombectomy given ischemic changes along L MCA territory on CTH with large correlating core on CTP. Course complicated by new-onset afib with RVR throughout hospitalization, cytotoxic swelling and pneumonia. Last CTH on 9/20 showing improvement in mass effect and no hemorrhagic transformation. Pt on ASA, remains intubated in ICU with no sedation; prolonged EEG on ,no seizures, EEG removed. Exam is poor but with slight improvement in motor exam, still severely disabled. Overall, suspect stroke is secondary to new-onset a fib detected upon admission, on asa; holding a/c given high risk of hemorrhagic conversion. Suspect severe disability and overall poor prognosis but family wishes to allow for more time. Palliative care onboard, GOC discussion being had, however at this time, family needs more time before any making any decisions. Pt remains full code. Family meeting planned for 9/27 to discuss PEG/Trach.     Stroke etiology likely embolic source (cardioembolic vs hypercoagulable state), fetal PCA noted on CTA possibly explaining multiple territory CVA    After family meeting discussion with representatives from MICU, Stroke Neurology, and Palliative care teams, family made the decision for palliative extubation on 9/29.    Stroke neurology to sign off at this time, no further stroke workup warranted at this time. Please re-consult as needed.     Discussed case with Neurology Attending Dr. Whitaker
87yo Female with PMHX of HTN, HLD, T2DM, hx of TIA vs CVA ~2.5w ago (admitted to [place] and discharged on ASA) without residual deficits BIBEMS to [facility] ED as trasnfesther from [facility] for L MCA stroke as possible thrombectomy candidate. LKW is today at 1300 when patient was speaking normally on the phone, later around 1400 patient was not picking up her phone, family was concerned and called neighbor for wellness check, was found to be unresponsive, in pool of urine with the smoke alarm going off. Patient was brought to [facility] where she was intubated for airway protection, on non-contrast CTH was found to have hyperdense left MCA sign, CTP and CTA H/N nondiagnostic due to poor contrast bolus however did note concern for hypopharynx injury 2/2 traumatic intubation. Pt transferred to [facility] for possible mechanical thrombectomy. On arrival to [facility] ED, patient intubated and sedated on propofol, , /80. After holding propofol for ~15 mins NIHSS 32, spontaneously moving LUE/LLE, localizing to noxious throughout left side, right UE 0/5, right LE tripleflexion to noxious. Delay in obtaining CT imaging due to stabilizing airway on ventilator and setting up ventilator settings. CTH with evidence of acute L MCA infarct, CTP with mismatch volume of 238cc with mismatch ratio 6.8 within L MCA territory, CTA H/N with left MCA bifurcation occlusion, free air in right carotid deep spaces likely due to traumatic intubation. Pt out of window for thrombolytic treatment. After discussion between Dr. Ontiveros and Dr. Plascencia, patient was deemed not a candidate for mechanical thrombectomy given ischemic changes along L MCA territory on CTH with large correlating core on CTP. Per family at bedside, patient a normally active and functional and ambulating multiple city blocks at a time. Prior to stroke 2-3 weeks before, patient otherwise fully functional, living alone with well controlled comorbidities. NSGY consulted for hemicrani watch.    < from: CT Head No Cont (09.19.23 @ 13:40) >    Impression:    Since 1923, no significant change in large left MCA and PCA territorial   infarctions withstable mass effect or midline shift. No hemorrhagic   transformation..    --- End of Report ---    < end of copied text >      < from: CT Head No Cont (09.20.23 @ 18:33) >    IMPRESSION:    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 No hemorrhagic transformation.    --- End of Report ---        < end of copied text >        PLAN:   -No neurosurgical intervention recommended  -Dr. Jair Leonardo discussed with family at length the recommendation for no surgery  -Rest of care per MICU    d/w Dr. Aimee MD    Please call for further questions or concerns
Admitting Diagnosis:   Patient is a 88y old  Female who presents with a chief complaint of stroke (20 Sep 2023 09:35)      PAST MEDICAL & SURGICAL HISTORY:      Current Nutrition Order:   Diet, NPO with Tube Feed:   Tube Feeding Modality: Nasogastric  Jevity 1.2 Moses (JEVITY1.2RTH)  Total Volume for 24 Hours (mL): 1008  Total Number of Cans: 5  Continuous  Until Goal Tube Feed Rate (mL per Hour): 56  Tube Feed Duration (in Hours): 18  Tube Feed Start Time: 11:00  Tube Feed Stop Time: 05:00  Volume Based Feeding Titration:  If the patient has achieved and tolerated the prescribed goal rate and tube feeding has been held within a 24hr period, titrate tube feeding rate based on guidelines, up to a maximum rate of 120mL/hr  Liquid Protein Supplement     Qty per Day:  1 (09-20-23 @ 13:05)    PO Intake: N/A    GI Issues: Abdomen non-distended/non-tender, +BS x4, last bowel movement 9/18    Pain: 0 per chart review    Skin Integrity: Warm/Dry/Intact, no edema noted     Labs:   09-20    154<H>  |  121<H>  |  25<H>  ----------------------------<  268<H>  4.2   |  25  |  0.67    Ca    9.5      20 Sep 2023 12:18  Phos  2.7     09-20  Mg     2.7     09-20    TPro  6.5  /  Alb  3.5  /  TBili  0.7  /  DBili  x   /  AST  32  /  ALT  10  /  AlkPhos  77  09-20    CAPILLARY BLOOD GLUCOSE      POCT Blood Glucose.: 219 mg/dL (20 Sep 2023 12:22)  POCT Blood Glucose.: 196 mg/dL (20 Sep 2023 05:43)  POCT Blood Glucose.: 176 mg/dL (19 Sep 2023 23:14)      Medications:  MEDICATIONS  (STANDING):  atorvastatin 80 milliGRAM(s) Oral at bedtime  chlorhexidine 0.12% Liquid 15 milliLiter(s) Oral Mucosa every 12 hours  chlorhexidine 2% Cloths 1 Application(s) Topical <User Schedule>  dextrose 5%. 1000 milliLiter(s) (100 mL/Hr) IV Continuous <Continuous>  dextrose 5%. 1000 milliLiter(s) (50 mL/Hr) IV Continuous <Continuous>  dextrose 50% Injectable 25 Gram(s) IV Push once  dextrose 50% Injectable 25 Gram(s) IV Push once  dextrose 50% Injectable 12.5 Gram(s) IV Push once  ertapenem  IVPB 1000 milliGRAM(s) IV Intermittent every 24 hours  glucagon  Injectable 1 milliGRAM(s) IntraMuscular once  insulin lispro (ADMELOG) corrective regimen sliding scale   SubCutaneous every 6 hours  levETIRAcetam  IVPB 250 milliGRAM(s) IV Intermittent every 12 hours    MEDICATIONS  (PRN):  dextrose Oral Gel 15 Gram(s) Oral once PRN Blood Glucose LESS THAN 70 milliGRAM(s)/deciliter    Height for BMI (CENTIMETERS)	152.4 Centimeter(s)  Weight for BMI (lbs)	112.4 lb  Weight for BMI (kg)	51 kg  Body Mass Index	21.9    Weight Change: No new wt since admit.     Estimated energy needs:   Weight used for calculations	IBW  Estimated Energy Needs Weight (lbs)	100.3 lb  Estimated Energy Needs Weight (kg)	45.5 kg  Estimated Energy Needs From (moses/kg)	25  Estimated Energy Needs To (moses/kg)	30  Estimated Energy Needs Calculated From (moses/kg)	1137  Estimated Energy Needs Calculated To (moses/kg)	1365  Weight used for calculations	IBW  Estimated Protein Needs Weight (lbs)	100.3 lb  Estimated Protein Needs Weight (kg)	45.5 kg  Estimated Protein Needs From (g/kg)	1.4  Estimated Protein Needs To (g/kg)	1.6  Estimated Protein Needs Calculated From (g/kg)	63.7  Estimated Protein Needs Calculated To (g/kg)	72.8  Estimated Fluid Needs Weight (lbs)	100.3 lb  Estimated Fluid Needs Weight (kg)	45.5 kg  Estimated Fluid Needs From (ml/kg)	25  Estimated Fluid Needs To (ml/kg)	30  Estimated Fluid Needs Calculated From (ml/kg)	1137  Estimated Fluid Needs Calculated To (ml/kg)	1365  Other Calculations	Estimated nutritional needs determined using [facility] Standards of Nutrition Care for vented pt.    Subjective: 87yo female with PMHx of HTN, HLD, T2DM presents to [facility] as a tx from [facility] i/s/o concern for large L sided MCA stroke. ICU consulted for further management.     Pt care discussed in interdisciplinary care team rounds. Rx and labs reviewed. Pt remains intubated and sedated at time of assessment; vent to VC-AC, MAP 88, no pressors, no propofol at time of assessment. Pt continues on enteral nutrition support via nasogastric tube; ordered for Jevity 1.2 over 24hrs. Spoke with team who is agreeable to adjust feeds to run over 18hrs; see recs below. Sodium goal of 145-155; continue Jevity 1.2 at this time and consider Jevity 1.5 if Na falls below. No other reports GI distress or further nutritional concerns at this time. RDN will continue to reassess, intervene, and monitor as appropriate.     Nutrition Diagnosis: Inadequate Oral Intake related to intubation as evidenced by need for NPO status with tube feeds.    Active [ x ]  Resolved [   ]    Goal: Pt will meet 75% or more of protein/energy needs via most appropriate route for nutrition.     Recommendations:  -Continue enteral nutrition support   *Recommend Jevity 1.2 @56 ml/hr with LPS x1/day from [id number] to provide 1008 ml tube feed, 1310 calories, 71 gProt., and 813 ml free water. This is 22.5 nonprotein calories and 1.56 gProt. per kg ideal body weight 45.5 kg.   -Monitor pressor and propofol demands; adjust tube feed as necessary   -Align nutrition with goals of care at all times   -Maintain aspiration precautions at all times  -Monitor chemistry, GI function, and skin integrity    **Orders communicated and pended to MICU team**    Risk Level: High [ x ] Moderate [   ] Low [   ]

## 2023-09-28 NOTE — PROGRESS NOTE ADULT - PROBLEM SELECTOR PLAN 1
CT brain showing opacified b/l MCAs L>R, cannot exclude thrombus. Massive CVA of L side with extensive debility longterm. Patient with minimal alertness, opening eyes sporadically while completely off sedation.  - No interventions possible for CVA
CT brain showing opacified b/l MCAs L>R, cannot exclude thrombus. Massive CVA of L side with extensive debility longterm. Patient with minimal alertness, opening eyes sporadically while completely off sedation.  - No interventions possible

## 2023-09-28 NOTE — PROGRESS NOTE ADULT - ASSESSMENT
89yo female with PMHx of HTN, HLD, T2DM presents to [facility] as a tx from [facility] i/s/o concern for large L sided MCA stroke. ICU consulted for further management.

## 2023-09-28 NOTE — PROGRESS NOTE ADULT - PROBLEM SELECTOR PLAN 4
Following for GOC and symptom management. Will continue to follow.    Saima Knapp MD  Palliative Care Attending  Geriatrics and Palliative Consult Service.

## 2023-09-28 NOTE — PROGRESS NOTE ADULT - ASSESSMENT
88F with PMHx of HTN, HLD, T2DM presents to [facility] as a tx from [facility] i/s/o concern for large L sided MCA stroke. ICU consulted for further management.     Neuro  #MCA Stroke  #MCA Thrombus  Pt intubated @ [facility] i/s/o airway protection for suspected large L MCA stroke. CT brain stroke protocol showing opacified b/l MCAs L>R, cannot exclude thrombus. Per Neurosurgical team, CT brain perfusion showing infarction around MCA territory which is possibly too substantial to allow for adequate brain recovery even if intervention is done on thrombus.  Per Neurosurg, no plan for intervention on thrombus and MCA stroke expected to enlarge with larger infarction to be expected with possible cerebral edema however no edema witnessed on scans at this time.  - Continue to provide ongoing support to family during GOC discussions   - Last GOC meeting on 09/27. Plan to withdraw care on 09/29 and extubate with hope to transition to inpatient hospice  - recommend SBP goal <180  - continue q4 neuro checks and vitals   - provide stroke education  - ok to start DVT ppx stroke standpoint     #Secondary stroke prevention  - Continue ASA 81mg and Atorvastatin 80mg daily   - Tentative plan to start AC 9/29 (14 days from stroke)    Cards  #Afib  New onset Afib with HR 120s. Patient bradycardiac over last few days, corresponding with initiation of lopressor on 09/22. EKG revealing sinus bradycardia.   - Discontinue lopressor 12.5 mg PO through NG tube BID  - Continue to monitor heart rate    #Hx of HTN  #BP Control  Will exhibit strict BP control i/s/o stroke. BPs in ED labile ranging from -190s.   - Continue home dose lopressor   - Monitor BP    Respiratory  #Acute Hypoxic Respiratory Failure i/s/o MCA Stroke w/ Asp Pna   CT Chest showing signs of possible aspiration pna i/s/o intubation. WBC 12 however likely reactive i/s/o stroke. Afebrile.   - aspiration precautions   -Completed 4-day course of Ertapenam 1000mg IVP for 4 days (09/20-09/23)    Renal  #Permissive Hypernatremia i/s/o MCA Infarct   Na 136 on admission. Per protocol, NaCl 3% 100cc bolus.   -Na at goal 145-155    Heme  #Elevated Hgb  HGB 15 on admission likely i/s/o hemoconcentration.   - ctm  - trend CBC    Endo  #Hx of T2DM  Hx of T2DM. A1c and home medications unknown.  - continue Kaiden     ID  See pulm    Prophylaxis:  - DVT: SCDs   - GI: none

## 2023-09-28 NOTE — CHART NOTE - NSCHARTNOTESELECT_GEN_ALL_CORE
Neurosurgery/Off Service Note
Nutrition Services
Off Service Note
Anti-infective approval/Event Note
Non-Face-to-Face Note/Event Note
Nutrition Services
Nutrition Services
Palliative Care Attending

## 2023-09-28 NOTE — PROGRESS NOTE ADULT - NS ATTEST RISK PROBLEM GEN_ALL_CORE FT
Acute Illness That Poses A Threat To Life  Abrupt Change In Neurological Status
Abrupt Change In Neurological Status  Decision Made To Not Resuscitate (DNR)

## 2023-09-28 NOTE — PROGRESS NOTE ADULT - PROBLEM SELECTOR PLAN 3
[facility] established. See note from 9/22/23 and 9/27/23.  - DNR. MOLST completed today.  - Palliative extubation planned for 9/29 at 12pm. Will transition to comfort measures at that time.
GOC ongoing. See note from 9/22/23.  - Full code  - Family meeting scheduled for 9/27/23 at 3pm

## 2023-09-28 NOTE — PROGRESS NOTE ADULT - SUBJECTIVE AND OBJECTIVE BOX
Patient is a 88y old  Female who presents with a chief complaint of stroke (23 Sep 2023 13:53)    INTERVAL HPI/OVERNIGHT EVENTS: NAEON.     SUBJECTIVE: Patient seen and examined at bedside. AAOx0.     Drips: N/A  Feeds: Jevity 1.2 56 cc/hr via NG tube  Access: L FA 20g 09/22    Vent: AC//12/30/5    ICU Vital Signs Last 24 Hrs  ICU Vital Signs Last 24 Hrs  T(C): 37.6 (28 Sep 2023 05:08), Max: 37.6 (27 Sep 2023 22:54)  T(F): 99.7 (28 Sep 2023 05:08), Max: 99.7 (27 Sep 2023 22:54)  HR: 73 (28 Sep 2023 05:11) (65 - 86)  BP: 99/53 (28 Sep 2023 05:00) (78/50 - 118/59)  BP(mean): 72 (28 Sep 2023 05:00) (60 - 86)  ABP: --  ABP(mean): --  RR: 20 (28 Sep 2023 05:11) (12 - 28)  SpO2: 100% (28 Sep 2023 05:11) (99% - 100%)    O2 Parameters below as of 28 Sep 2023 05:11  Patient On (Oxygen Delivery Method): ventilator    O2 Concentration (%): 30    I&O's Detail    I&O's Detail    26 Sep 2023 07:01  -  27 Sep 2023 07:00  --------------------------------------------------------  IN:    Enteral Tube Flush: 120 mL    Free Water: 500 mL    Jevity 1.2: 840 mL  Total IN: 1460 mL    OUT:    Incontinent per Collection Bag (mL): 1000 mL  Total OUT: 1000 mL    Total NET: 460 mL      27 Sep 2023 07:01  -  28 Sep 2023 06:13  --------------------------------------------------------  IN:    Free Water: 250 mL    Jevity 1.2: 728 mL  Total IN: 978 mL    OUT:    Incontinent per Collection Bag (mL): 900 mL  Total OUT: 900 mL    Total NET: 78 mL    LABS:                          11.3   12.92 )-----------( 194      ( 28 Sep 2023 05:23 )             33.4     09-28    131<L>  |  97  |  32<H>  ----------------------------<  320<H>  4.3   |  28  |  0.58    Ca    8.8      28 Sep 2023 05:23  Phos  3.8     09-28  Mg     2.2     09-28    TPro  5.8<L>  /  Alb  3.1<L>  /  TBili  0.6  /  DBili  x   /  AST  47<H>  /  ALT  16  /  AlkPhos  61  09-28      Urinalysis Basic - ( 28 Sep 2023 05:23 )    Color: x / Appearance: x / SG: x / pH: x  Gluc: 320 mg/dL / Ketone: x  / Bili: x / Urobili: x   Blood: x / Protein: x / Nitrite: x   Leuk Esterase: x / RBC: x / WBC x   Sq Epi: x / Non Sq Epi: x / Bacteria: x    RADIOLOGY, EKG & ADDITIONAL TESTS: Reviewed.     Consultant(s) Notes Reviewed:  [x ] YES  [ ] NO    MEDICATIONS  (STANDING):  aspirin  chewable 81 milliGRAM(s) Oral every 24 hours  atorvastatin 80 milliGRAM(s) Oral at bedtime  chlorhexidine 0.12% Liquid 15 milliLiter(s) Oral Mucosa every 12 hours  chlorhexidine 2% Cloths 1 Application(s) Topical <User Schedule>  dextrose 5%. 1000 milliLiter(s) (100 mL/Hr) IV Continuous <Continuous>  dextrose 5%. 1000 milliLiter(s) (50 mL/Hr) IV Continuous <Continuous>  dextrose 50% Injectable 25 Gram(s) IV Push once  dextrose 50% Injectable 25 Gram(s) IV Push once  dextrose 50% Injectable 12.5 Gram(s) IV Push once  ertapenem  IVPB 1000 milliGRAM(s) IV Intermittent every 24 hours  glucagon  Injectable 1 milliGRAM(s) IntraMuscular once  insulin lispro (ADMELOG) corrective regimen sliding scale   SubCutaneous every 6 hours  metoprolol tartrate 12.5 milliGRAM(s) Oral two times a day    MEDICATIONS  (PRN):  dextrose Oral Gel 15 Gram(s) Oral once PRN Blood Glucose LESS THAN 70 milliGRAM(s)/deciliter      PHYSICAL EXAM:  General: thin; frail  HEENT: NC/AT; PERRL  Neck: intubated   Cardiovascular: Irregular rate w/ occasional rapid rhythm +S1/S2; NO M/R/G  Respiratory: CTA B/L; no W/R/R  Gastrointestinal: soft, NT/ND; +BSx4  Extremities: WWP; no edema or cyanosis; warm upper extremities with palpable radial pulses, cooler lower extremities but pulses still palpable  Vascular: 2+ radial, DP/PT pulses B/L  Neurological: AAOx0; intubated. Slight Upgoing Babinski noted    Care Discussed with Consultants/Other Providers [ x] YES  [ ] NO Patient is a 88y old  Female who presents with a chief complaint of stroke (23 Sep 2023 13:53)    INTERVAL HPI/OVERNIGHT EVENTS: NAEON.     SUBJECTIVE: Patient seen and examined at bedside. AAOx0.     Drips: N/A  Feeds: Jevity 1.2 56 cc/hr via NG tube  Access: L FA 20g 09/22    Vent: AC//12/30/5    ICU Vital Signs Last 24 Hrs  ICU Vital Signs Last 24 Hrs  T(C): 37.6 (28 Sep 2023 05:08), Max: 37.6 (27 Sep 2023 22:54)  T(F): 99.7 (28 Sep 2023 05:08), Max: 99.7 (27 Sep 2023 22:54)  HR: 73 (28 Sep 2023 05:11) (65 - 86)  BP: 99/53 (28 Sep 2023 05:00) (78/50 - 118/59)  BP(mean): 72 (28 Sep 2023 05:00) (60 - 86)  ABP: --  ABP(mean): --  RR: 20 (28 Sep 2023 05:11) (12 - 28)  SpO2: 100% (28 Sep 2023 05:11) (99% - 100%)    O2 Parameters below as of 28 Sep 2023 05:11  Patient On (Oxygen Delivery Method): ventilator    O2 Concentration (%): 30    I&O's Detail    27 Sep 2023 07:01  -  28 Sep 2023 07:00  --------------------------------------------------------  IN:    Free Water: 250 mL    Jevity 1.2: 1008 mL  Total IN: 1258 mL    OUT:    Incontinent per Collection Bag (mL): 900 mL  Total OUT: 900 mL    Total NET: 358 mL        LABS:                          11.3   12.92 )-----------( 194      ( 28 Sep 2023 05:23 )             33.4     09-28    131<L>  |  97  |  32<H>  ----------------------------<  320<H>  4.3   |  28  |  0.58    Ca    8.8      28 Sep 2023 05:23  Phos  3.8     09-28  Mg     2.2     09-28    TPro  5.8<L>  /  Alb  3.1<L>  /  TBili  0.6  /  DBili  x   /  AST  47<H>  /  ALT  16  /  AlkPhos  61  09-28      Urinalysis Basic - ( 28 Sep 2023 05:23 )    Color: x / Appearance: x / SG: x / pH: x  Gluc: 320 mg/dL / Ketone: x  / Bili: x / Urobili: x   Blood: x / Protein: x / Nitrite: x   Leuk Esterase: x / RBC: x / WBC x   Sq Epi: x / Non Sq Epi: x / Bacteria: x    RADIOLOGY, EKG & ADDITIONAL TESTS: Reviewed.     Consultant(s) Notes Reviewed:  [x ] YES  [ ] NO    MEDICATIONS  (STANDING):  aspirin  chewable 81 milliGRAM(s) Oral every 24 hours  atorvastatin 80 milliGRAM(s) Oral at bedtime  chlorhexidine 0.12% Liquid 15 milliLiter(s) Oral Mucosa every 12 hours  chlorhexidine 2% Cloths 1 Application(s) Topical <User Schedule>  dextrose 5%. 1000 milliLiter(s) (100 mL/Hr) IV Continuous <Continuous>  dextrose 5%. 1000 milliLiter(s) (50 mL/Hr) IV Continuous <Continuous>  dextrose 50% Injectable 25 Gram(s) IV Push once  dextrose 50% Injectable 25 Gram(s) IV Push once  dextrose 50% Injectable 12.5 Gram(s) IV Push once  ertapenem  IVPB 1000 milliGRAM(s) IV Intermittent every 24 hours  glucagon  Injectable 1 milliGRAM(s) IntraMuscular once  insulin lispro (ADMELOG) corrective regimen sliding scale   SubCutaneous every 6 hours  metoprolol tartrate 12.5 milliGRAM(s) Oral two times a day    MEDICATIONS  (PRN):  dextrose Oral Gel 15 Gram(s) Oral once PRN Blood Glucose LESS THAN 70 milliGRAM(s)/deciliter      PHYSICAL EXAM:  General: thin; frail  HEENT: NC/AT; PERRL  Neck: intubated   Cardiovascular: Irregular rate w/ occasional rapid rhythm +S1/S2; NO M/R/G  Respiratory: CTA B/L; no W/R/R  Gastrointestinal: soft, NT/ND; +BSx4  Extremities: WWP; no edema or cyanosis; warm upper extremities with palpable radial pulses, cooler lower extremities but pulses still palpable  Vascular: 2+ radial, DP/PT pulses B/L  Neurological: AAOx0; intubated. Slight Upgoing Babinski noted    Care Discussed with Consultants/Other Providers [ x] YES  [ ] NO

## 2023-09-28 NOTE — PROGRESS NOTE ADULT - SUBJECTIVE AND OBJECTIVE BOX
SUBJECTIVE AND OBJECTIVE:  Indication for Geriatrics and Palliative Care Services: [facility]    OVERNIGHT EVENTS: Family meeting held yesterday and family opted for palliative extubation on Friday 12pm followed by a transition to comfort measures.    Allergies    No Known Allergies    Intolerances    MEDICATIONS  (STANDING):  artificial tears (preservative free) Ophthalmic Solution 1 Drop(s) Both EYES daily  aspirin  chewable 81 milliGRAM(s) Oral every 24 hours  atorvastatin 80 milliGRAM(s) Oral at bedtime  chlorhexidine 0.12% Liquid 15 milliLiter(s) Oral Mucosa every 12 hours  chlorhexidine 2% Cloths 1 Application(s) Topical <User Schedule>  dextrose 5%. 1000 milliLiter(s) (50 mL/Hr) IV Continuous <Continuous>  dextrose 5%. 1000 milliLiter(s) (100 mL/Hr) IV Continuous <Continuous>  dextrose 50% Injectable 25 Gram(s) IV Push once  dextrose 50% Injectable 25 Gram(s) IV Push once  dextrose 50% Injectable 12.5 Gram(s) IV Push once  glucagon  Injectable 1 milliGRAM(s) IntraMuscular once  insulin lispro (ADMELOG) corrective regimen sliding scale   SubCutaneous every 6 hours  metoprolol tartrate 12.5 milliGRAM(s) Oral every 12 hours  pantoprazole   Suspension 40 milliGRAM(s) Oral daily    MEDICATIONS  (PRN):  dextrose Oral Gel 15 Gram(s) Oral once PRN Blood Glucose LESS THAN 70 milliGRAM(s)/deciliter      ITEMS UNCHECKED ARE NOT PRESENT    PRESENT SYMPTOMS: [X]Unable to self-report  Source if other than patient:  [ ]Family   [ ]Team     Pain:  [ ]yes [ ]no  QOL impact -   Location -                    Aggravating factors -  Quality -  Radiation -  Timing-  Severity (0-10 scale):  Minimal acceptable level (0-10 scale):     Dyspnea:                           [ ]Mild [ ]Moderate [ ]Severe  Anxiety:                             [ ]Mild [ ]Moderate [ ]Severe  Fatigue:                             [ ]Mild [ ]Moderate [ ]Severe  Nausea:                             [ ]Mild [ ]Moderate [ ]Severe  Loss of appetite:              [ ]Mild [ ]Moderate [ ]Severe  Constipation:                    [ ]Mild [ ]Moderate [ ]Severe    Other Symptoms:  [X]All other review of systems negative     Palliative Performance Status Version 2:     10%      http://npcrc.org/files/news/palliative_performance_scale_ppsv2.pdf    PHYSICAL EXAM:  Vital Signs Last 24 Hrs  T(C): 37.7 (28 Sep 2023 14:43), Max: 37.7 (28 Sep 2023 14:43)  T(F): 99.9 (28 Sep 2023 14:43), Max: 99.9 (28 Sep 2023 14:43)  HR: 63 (28 Sep 2023 15:00) (61 - 74)  BP: 99/54 (28 Sep 2023 15:00) (87/54 - 120/59)  BP(mean): 72 (28 Sep 2023 15:00) (66 - 82)  RR: 17 (28 Sep 2023 15:00) (16 - 28)  SpO2: 100% (28 Sep 2023 15:00) (100% - 100%)    Parameters below as of 28 Sep 2023 15:00  Patient On (Oxygen Delivery Method): ventilator    O2 Concentration (%): 30 I&O's Summary    27 Sep 2023 07:01  -  28 Sep 2023 07:00  --------------------------------------------------------  IN: 1258 mL / OUT: 900 mL / NET: 358 mL    28 Sep 2023 07:01  -  28 Sep 2023 15:56  --------------------------------------------------------  IN: 280 mL / OUT: 250 mL / NET: 30 mL       GENERAL: [ ]Cachexia    [ ]Alert  [ ]Oriented x   [ ]Lethargic  [X]Unarousable  [ ]Verbal  [X]Non-Verbal  Behavioral:   [ ]Anxiety  [ ]Delirium [ ]Agitation [X]Calm  HEENT:  [ ]Normal   [ ]Dry mouth   [X]ET Tube [ ]Oral lesions  PULMONARY:   [X]Clear [ ]Tachypnea  [ ]Audible excessive secretions   [ ]Rhonchi        [ ]Right [ ]Left [ ]Bilateral  [ ]Crackles        [ ]Right [ ]Left [ ]Bilateral  [ ]Wheezing     [ ]Right [ ]Left [ ]Bilateral  [ ]Diminished BS [ ] Right [ ]Left [ ]Bilateral  CARDIOVASCULAR:    [X]Regular [ ]Irregular [ ]Tachy  [ ]Isma [ ]Murmur [ ]Other  GASTROINTESTINAL:  [X]Soft  [ ]Distended   [ ]+BS  [X]Non tender [ ]Tender  [ ]Other [ ]PEG [ ]OGT/ NGT   Last BM:   GENITOURINARY:  [ ]Normal [ ]Incontinent   [ ]Oliguria/Anuria   [X]Carlos  MUSCULOSKELETAL:   [ ]Normal   [ ]Weakness  [X]Bed/Wheelchair bound [ ]Edema  NEUROLOGIC:   [ ]No focal deficits  [X] Cognitive impairment  [ ] Dysphagia [ ]Dysarthria [ ] Paresis [ ]Other   SKIN:   [X]Normal  [ ]Rash  [ ]Other  [ ]Pressure ulcer(s) [ ]y [ ]n present on admission    CRITICAL CARE:  [ ]Shock Present  [ ]Septic [ ]Cardiogenic [ ]Neurologic [ ]Hypovolemic  [ ]Vasopressors [ ]Inotropes  [ ]Respiratory failure present [ ]Mechanical Ventilation [ ]Non-invasive ventilatory support [ ]High-Flow Mode: AC/ CMV (Assist Control/ Continuous Mandatory Ventilation), RR (machine): 12, TV (machine): 300, FiO2: 30, PEEP: 5, ITime: 1, MAP: 6.5, PIP: 10  [ ]Acute  [ ]Chronic [ ]Hypoxic  [ ]Hypercarbic [ ]Other  [ ]Other organ failure     LABS:                        11.3   12.92 )-----------( 194      ( 28 Sep 2023 05:23 )             33.4   09-28    131<L>  |  97  |  32<H>  ----------------------------<  320<H>  4.3   |  28  |  0.58    Ca    8.8      28 Sep 2023 05:23  Phos  3.8     09-28  Mg     2.2     09-28    TPro  5.8<L>  /  Alb  3.1<L>  /  TBili  0.6  /  DBili  x   /  AST  47<H>  /  ALT  16  /  AlkPhos  61  09-28      Urinalysis Basic - ( 28 Sep 2023 05:23 )    Color: x / Appearance: x / SG: x / pH: x  Gluc: 320 mg/dL / Ketone: x  / Bili: x / Urobili: x   Blood: x / Protein: x / Nitrite: x   Leuk Esterase: x / RBC: x / WBC x   Sq Epi: x / Non Sq Epi: x / Bacteria: x      RADIOLOGY & ADDITIONAL STUDIES:     Protein Calorie Malnutrition Present: [ ]mild [ ]moderate [ ]severe [ ]underweight [ ]morbid obesity  https://www.andeal.org/vault/2440/web/files/ONC/Table_Clinical%20Characteristics%20to%20Document%20Malnutrition-White%20JV%20et%20al%202012.pdf    Height (cm): 152.4 (09-17-23 @ 18:00)  Weight (kg): 51 (09-15-23 @ 22:34)  BMI (kg/m2): 22 (09-17-23 @ 18:00)    [X]PPSV2 < or = 30%  [ ]significant weight loss [ ]poor nutritional intake [ ]anasarca[X]Artificial Nutrition    REFERRALS:   [ ]Chaplaincy  [ ]Hospice  [ ]Child Life  [X]Social Work [X]Patient and Family Support [ ]Case management [ ]Holistic Therapy [ ] Music therapy  [X] Massage Therapy    DISCUSSION OF CASE: Family - obtained additional history and to provide emotional support;  Primary team - discussed plan of care

## 2023-09-29 NOTE — PROGRESS NOTE ADULT - SUBJECTIVE AND OBJECTIVE BOX
Patient is a 88y old  Female who presents with a chief complaint of stroke (23 Sep 2023 13:53)    INTERVAL HPI/OVERNIGHT EVENTS: NAEON.     SUBJECTIVE: Patient seen and examined at bedside. AAOx0.     Drips: N/A  Feeds: Jevity 1.2 56 cc/hr via NG tube  Access: L FA 20g 09/22    Vent: AC//12/30/5    ICU Vital Signs Last 24 Hrs  ICU Vital Signs Last 24 Hrs  T(C): 37.4 (29 Sep 2023 06:03), Max: 37.7 (28 Sep 2023 14:43)  T(F): 99.3 (29 Sep 2023 06:03), Max: 99.9 (28 Sep 2023 14:43)  HR: 59 (29 Sep 2023 07:00) (57 - 78)  BP: 106/57 (29 Sep 2023 07:00) (90/55 - 120/82)  BP(mean): 77 (29 Sep 2023 07:00) (67 - 92)  ABP: --  ABP(mean): --  RR: 16 (29 Sep 2023 07:00) (16 - 30)  SpO2: 100% (29 Sep 2023 07:00) (96% - 100%)    O2 Parameters below as of 29 Sep 2023 08:00  Patient On (Oxygen Delivery Method): ventilator    O2 Concentration (%): 30    I&O's Detail    I&O's Detail    28 Sep 2023 07:01  -  29 Sep 2023 07:00  --------------------------------------------------------  IN:    Jevity 1.2: 1008 mL  Total IN: 1008 mL    OUT:    Incontinent per Collection Bag (mL): 750 mL  Total OUT: 750 mL    Total NET: 258 mL      LABS:                          11.3   12.92 )-----------( 194      ( 28 Sep 2023 05:23 )             33.4     09-28    131<L>  |  97  |  32<H>  ----------------------------<  320<H>  4.3   |  28  |  0.58    Ca    8.8      28 Sep 2023 05:23  Phos  3.8     09-28  Mg     2.2     09-28    TPro  5.8<L>  /  Alb  3.1<L>  /  TBili  0.6  /  DBili  x   /  AST  47<H>  /  ALT  16  /  AlkPhos  61  09-28      Urinalysis Basic - ( 28 Sep 2023 05:23 )    Color: x / Appearance: x / SG: x / pH: x  Gluc: 320 mg/dL / Ketone: x  / Bili: x / Urobili: x   Blood: x / Protein: x / Nitrite: x   Leuk Esterase: x / RBC: x / WBC x   Sq Epi: x / Non Sq Epi: x / Bacteria: x    RADIOLOGY, EKG & ADDITIONAL TESTS: Reviewed.     Consultant(s) Notes Reviewed:  [x ] YES  [ ] NO    MEDICATIONS  (STANDING):  aspirin  chewable 81 milliGRAM(s) Oral every 24 hours  atorvastatin 80 milliGRAM(s) Oral at bedtime  chlorhexidine 0.12% Liquid 15 milliLiter(s) Oral Mucosa every 12 hours  chlorhexidine 2% Cloths 1 Application(s) Topical <User Schedule>  dextrose 5%. 1000 milliLiter(s) (100 mL/Hr) IV Continuous <Continuous>  dextrose 5%. 1000 milliLiter(s) (50 mL/Hr) IV Continuous <Continuous>  dextrose 50% Injectable 25 Gram(s) IV Push once  dextrose 50% Injectable 25 Gram(s) IV Push once  dextrose 50% Injectable 12.5 Gram(s) IV Push once  ertapenem  IVPB 1000 milliGRAM(s) IV Intermittent every 24 hours  glucagon  Injectable 1 milliGRAM(s) IntraMuscular once  insulin lispro (ADMELOG) corrective regimen sliding scale   SubCutaneous every 6 hours  metoprolol tartrate 12.5 milliGRAM(s) Oral two times a day    MEDICATIONS  (PRN):  dextrose Oral Gel 15 Gram(s) Oral once PRN Blood Glucose LESS THAN 70 milliGRAM(s)/deciliter      PHYSICAL EXAM:  General: thin; frail  HEENT: NC/AT; PERRL  Neck: intubated   Cardiovascular: Irregular rate w/ occasional rapid rhythm +S1/S2; NO M/R/G  Respiratory: CTA B/L; no W/R/R  Gastrointestinal: soft, NT/ND; +BSx4  Extremities: WWP; no edema or cyanosis; warm upper extremities with palpable radial pulses, cooler lower extremities but pulses still palpable  Vascular: 2+ radial, DP/PT pulses B/L  Neurological: AAOx0; intubated. Slight Upgoing Babinski noted    Care Discussed with Consultants/Other Providers [ x] YES  [ ] NO Patient is a 88y old  Female who presents with a chief complaint of stroke (23 Sep 2023 13:53)    INTERVAL HPI/OVERNIGHT EVENTS: NAEON.     SUBJECTIVE: Patient seen and examined at bedside. AAOx0.     Drips: N/A  Feeds: Jevity 1.2 56 cc/hr via NG tube  Access: L FA 20g 09/22    Vent: AC//12/30/5    ICU Vital Signs Last 24 Hrs  ICU Vital Signs Last 24 Hrs  T(C): 37.4 (29 Sep 2023 06:03), Max: 37.7 (28 Sep 2023 14:43)  T(F): 99.3 (29 Sep 2023 06:03), Max: 99.9 (28 Sep 2023 14:43)  HR: 59 (29 Sep 2023 07:00) (57 - 78)  BP: 106/57 (29 Sep 2023 07:00) (90/55 - 120/82)  BP(mean): 77 (29 Sep 2023 07:00) (67 - 92)  ABP: --  ABP(mean): --  RR: 16 (29 Sep 2023 07:00) (16 - 30)  SpO2: 100% (29 Sep 2023 07:00) (96% - 100%)    O2 Parameters below as of 29 Sep 2023 08:00  Patient On (Oxygen Delivery Method): ventilator    O2 Concentration (%): 30    I&O's Detail    28 Sep 2023 07:01  -  29 Sep 2023 07:00  --------------------------------------------------------  IN:    Jevity 1.2: 1008 mL  Total IN: 1008 mL    OUT:    Incontinent per Collection Bag (mL): 750 mL  Total OUT: 750 mL    Total NET: 258 mL    Consultant(s) Notes Reviewed:  [x ] YES  [ ] NO    MEDICATIONS  (STANDING):  aspirin  chewable 81 milliGRAM(s) Oral every 24 hours  atorvastatin 80 milliGRAM(s) Oral at bedtime  chlorhexidine 0.12% Liquid 15 milliLiter(s) Oral Mucosa every 12 hours  chlorhexidine 2% Cloths 1 Application(s) Topical <User Schedule>  dextrose 5%. 1000 milliLiter(s) (100 mL/Hr) IV Continuous <Continuous>  dextrose 5%. 1000 milliLiter(s) (50 mL/Hr) IV Continuous <Continuous>  dextrose 50% Injectable 25 Gram(s) IV Push once  dextrose 50% Injectable 25 Gram(s) IV Push once  dextrose 50% Injectable 12.5 Gram(s) IV Push once  ertapenem  IVPB 1000 milliGRAM(s) IV Intermittent every 24 hours  glucagon  Injectable 1 milliGRAM(s) IntraMuscular once  insulin lispro (ADMELOG) corrective regimen sliding scale   SubCutaneous every 6 hours  metoprolol tartrate 12.5 milliGRAM(s) Oral two times a day    MEDICATIONS  (PRN):  dextrose Oral Gel 15 Gram(s) Oral once PRN Blood Glucose LESS THAN 70 milliGRAM(s)/deciliter      PHYSICAL EXAM:  General: thin; frail  HEENT: NC/AT; PERRL; Eyes blinking today but not tracking movements  Neck: intubated   Cardiovascular: Irregular rate w/ occasional rapid rhythm +S1/S2; NO M/R/G  Respiratory: CTA B/L; no W/R/R  Gastrointestinal: soft, NT/ND; +BSx4  Extremities: WWP; no edema or cyanosis; warm upper extremities with palpable radial pulses, cooler lower extremities but pulses still palpable  Vascular: 2+ radial, DP/PT pulses B/L  Neurological: AAOx0; intubated. Slight Upgoing Babinski noted    Care Discussed with Consultants/Other Providers [ x] YES  [ ] NO

## 2023-09-29 NOTE — H&P ADULT - NSHPSOCIALHISTORY_GEN_ALL_CORE
Previously living independently at home. SOCIAL HISTORY:   Significant other/partner:  []  Children:  [x]   Substance hx:  []   Tobacco hx:  []   Alcohol hx: []  Living Situation: [x]Home  []Long term care  []Rehab []Other  Nondenominational/Spiritual practice: Church; Role of organized [employer]ism [x] important [] some [] unable to assess  Coping (family): [] well [x] with difficulty [] poor coping [] unable to assess  Support system: [x] strong [] adequate [] inadequate    ADVANCE DIRECTIVES:    [x]MOLST: DNR/DNI  DECISION MAKER(s):  [] Health Care Proxy(s)  [x] Surrogate(s)  [] Guardian           Name(s)/Phone Number(s): Guero Anaya, daughter, [phone]    Previously living independently at home.

## 2023-09-29 NOTE — H&P ADULT - PROBLEM SELECTOR PLAN 2
Hydromorphone 0.2 mg IV q2h PRN for dyspnea  Patient has active end-of-life symptoms requiring skilled nursing assessment and administration of IV medications as she has no oral route Glycopyrrolate 0.4 mg IV q6h PRN

## 2023-09-29 NOTE — H&P ADULT - PROBLEM SELECTOR PLAN 3
No Glycopyrrolate 0.4 mg IV q6h PRN Hydromorphone 0.5 mg IV q2h PRN for pain  Patient has active end-of-life symptoms requiring skilled nursing assessment and administration of IV medications as she has no oral route

## 2023-09-29 NOTE — DISCHARGE NOTE PROVIDER - HOSPITAL COURSE
#Discharge: do not delete    Alda Connor is a with past medical history of well controlled HTN, HLD and T2DM presenting to ED after being found down by neighbor and found to have left-sided MCA thrombus. Admitted to MICU for stroke management.      #MCA Thrombus  Pt intubated @ [facility] i/s/o airway protection for suspected large L MCA stroke. CT brain stroke protocol showing opacified b/l MCAs L>R, cannot exclude thrombus. Per CT brain perfusion showing infarction around MCA territory which is possibly too substantial to allow for adequate brain recovery even if intervention is done on thrombus, no surgical intervention by neurosurgery. Repeat CT heads obtained to evaluate for peak swelling and treated with hypertonic saline and mannitol to reach goal Na of 145-155. vEEG performed with no evidence of seizures. Patient is AAOx0. Has faint movement of extremities and blinks eyes but does not track eye movements or respond. Palliative team consulted and GOC discussions had with family. Decision made to extubate on 09/29 and transfer patient to hospice care for comfort measures.   - Continue to provide ongoing support to family during GOC discussions     #Secondary stroke prevention  - Continue ASA 81mg and Atorvastatin 80mg daily     #Afib  Few episodes of atrial fibrillation with RVR. Treated with IV lopressor. Resolved at time of discharge.   - Continue to monitor heart rate  - Continue with lopressor 12.5 mg BID with hold parameters    #Acute Hypoxic Respiratory Failure i/s/o MCA Stroke w/ Asp Pna   CT Chest showing signs of possible aspiration pna i/s/o intubation. WBC 12 however likely reactive i/s/o stroke. Afebrile. Maintained aspiration precautions. Completed 4-day course of ertapenem 1000mg IVP for 4 days (09/20-09/23)  - Resolved    #Hx of T2DM  Hx of T2DM. A1c and home medications unknown.  - continue Kaiden     Patient was discharged to: hospice    New medications: N/A  Changes to old medications: N/A  Medications that were stopped: N/A    Items to follow up as outpatient: N/A    Physical exam at the time of discharge:    PHYSICAL EXAM:  General: thin; frail  HEENT: NC/AT; PERRL; Eyes blinking today but not tracking movements  Neck: supple  Cardiovascular: Irregular rate; +S1/S2; NO M/R/G  Respiratory: CTA B/L; no W/R/R  Gastrointestinal: soft, NT/ND; +BSx4  Extremities: WWP; no edema or cyanosis; warm upper extremities with palpable radial pulses, cooler lower extremities but pulses still palpable  Vascular: 2+ radial, DP/PT pulses B/L  Neurological: AAOx0

## 2023-09-29 NOTE — DISCHARGE NOTE PROVIDER - NSDCCPCAREPLAN_GEN_ALL_CORE_FT
PRINCIPAL DISCHARGE DIAGNOSIS  Diagnosis: Stroke  Assessment and Plan of Treatment: You were noted to have a stroke, also known as a cerebrovascular accident (CVA). This was found based on your symptom profile, and findings noted on computed tomographical imaging (CT imaging) of your brain. You were intubated in the MICU and unresponsive for several days. You were managed with medications and regular head imaging to assess for swelling in your brain related to the stroke. You had an EEG done which ruled out any seizure activity. Family decision was made to extubate on 09/29 and you were transferred to hospice care.

## 2023-09-29 NOTE — H&P ADULT - NSHPREVIEWOFSYSTEMS_GEN_ALL_CORE
PRESENT SYMPTOMS/REVIEW OF SYSTEMS: [x]Unable to obtain due to poor mentation/encephalopathy  Source if other than patient:  [x]Family   [x]Team     Pain: [x] yes [] no - see PAINAD  QOL Impact -   Location -                    Aggravating Factors -  Quality -  Radiation -  Timing -  Severity (0-10 scale) -   Minimal Acceptable Level (0-10 scale) -    PAIN AD Score: 1  (Nonverbal Pain Assessment Scale)    Dyspnea:                           [x]Mild  []Moderate []Severe  Anxiety:                             []Mild []Moderate []Severe  Fatigue:                             []Mild []Moderate []Severe  Nausea:                             []Mild []Moderate []Severe  Loss of Appetite:              []Mild []Moderate []Severe  Constipation:                    []Mild []Moderate []Severe    Other Symptoms:  []All Other Review Of Systems Negative - patient is unable to self-report    Palliative Performance Status Version 2:  10%

## 2023-09-29 NOTE — DISCHARGE NOTE PROVIDER - NSDCMRMEDTOKEN_GEN_ALL_CORE_FT
aspirin 81 mg oral tablet, chewable: 1 tab(s) orally every 24 hours  metoprolol: 12.5 milligram(s) 2 times a day

## 2023-09-29 NOTE — PROGRESS NOTE ADULT - ASSESSMENT
88F with PMHx of HTN, HLD, T2DM presents to [facility] as a tx from [facility] i/s/o concern for large L sided MCA stroke. ICU consulted for further management.     Neuro  #MCA Stroke  #MCA Thrombus  Pt intubated @ [facility] i/s/o airway protection for suspected large L MCA stroke. CT brain stroke protocol showing opacified b/l MCAs L>R, cannot exclude thrombus. Per Neurosurgical team, CT brain perfusion showing infarction around MCA territory which is possibly too substantial to allow for adequate brain recovery even if intervention is done on thrombus.  Per Neurosurg, no plan for intervention on thrombus and MCA stroke expected to enlarge with larger infarction to be expected with possible cerebral edema however no edema witnessed on scans at this time.  - Continue to provide ongoing support to family during GOC discussions   - Last GOC meeting on 09/27. Plan to withdraw care on 09/29 and extubate with hope to transition to inpatient hospice  - recommend SBP goal <180  - continue q4 neuro checks and vitals   - provide stroke education  - ok to start DVT ppx stroke standpoint     #Secondary stroke prevention  - Continue ASA 81mg and Atorvastatin 80mg daily   - Tentative plan to start AC 9/29 (14 days from stroke)    Cards  #Afib  New onset Afib with HR 120s. Patient bradycardiac over last few days, corresponding with initiation of lopressor on 09/22. EKG revealing sinus bradycardia.   - Discontinue lopressor 12.5 mg PO through NG tube BID  - Continue to monitor heart rate    #Hx of HTN  #BP Control  Will exhibit strict BP control i/s/o stroke. BPs in ED labile ranging from -190s.   - Continue home dose lopressor   - Monitor BP    Respiratory  #Acute Hypoxic Respiratory Failure i/s/o MCA Stroke w/ Asp Pna   CT Chest showing signs of possible aspiration pna i/s/o intubation. WBC 12 however likely reactive i/s/o stroke. Afebrile.   - aspiration precautions   -Completed 4-day course of Ertapenam 1000mg IVP for 4 days (09/20-09/23)    Renal  #Permissive Hypernatremia i/s/o MCA Infarct   Na 136 on admission. Per protocol, NaCl 3% 100cc bolus.   -Na at goal 145-155    Heme  #Elevated Hgb  HGB 15 on admission likely i/s/o hemoconcentration.   - ctm  - trend CBC    Endo  #Hx of T2DM  Hx of T2DM. A1c and home medications unknown.  - continue Kaiden     ID  See pulm    Prophylaxis:  - DVT: SCDs   - GI: none    88F with PMHx of HTN, HLD, T2DM presents to [facility] as a tx from [facility] i/s/o concern for large L sided MCA stroke. ICU consulted for further management.     Neuro  #MCA Stroke  #MCA Thrombus  Pt intubated @ [facility] i/s/o airway protection for suspected large L MCA stroke. CT brain stroke protocol showing opacified b/l MCAs L>R, cannot exclude thrombus. Per Neurosurgical team, CT brain perfusion showing infarction around MCA territory which is possibly too substantial to allow for adequate brain recovery even if intervention is done on thrombus.  Per Neurosurg, no plan for intervention on thrombus and MCA stroke expected to enlarge with larger infarction to be expected with possible cerebral edema however no edema witnessed on scans at this time.  - Continue to provide ongoing support to family during GOC discussions   - Last GOC meeting on 09/27.   - Planned extubation with palliative team today 09/29 @ 12pm. Transition to comfort measures after.   - recommend SBP goal <180  - continue q4 neuro checks and vitals   - provide stroke education  - ok to start DVT ppx stroke standpoint     #Secondary stroke prevention  - Continue ASA 81mg and Atorvastatin 80mg daily   - Plan to start AC 9/29 (14 days from stroke)    Cards  #Afib  New onset Afib with HR 120s. HR WNL today  - Continue to monitor heart rate    #Hx of HTN  #BP Control  Will exhibit strict BP control i/s/o stroke. BPs in ED labile ranging from -190s.   - Monitor BP    Respiratory  #Acute Hypoxic Respiratory Failure i/s/o MCA Stroke w/ Asp Pna   CT Chest showing signs of possible aspiration pna i/s/o intubation. WBC 12 however likely reactive i/s/o stroke. Afebrile.   - aspiration precautions   -Completed 4-day course of Ertapenam 1000mg IVP for 4 days (09/20-09/23)    Renal  #Permissive Hypernatremia i/s/o MCA Infarct   Na 136 on admission. Per protocol, NaCl 3% 100cc bolus.   -Na at goal 145-155    Heme  #Elevated Hgb  HGB 15 on admission likely i/s/o hemoconcentration.   - ctm  - trend CBC    Endo  #Hx of T2DM  Hx of T2DM. A1c and home medications unknown.  - continue Kaiden     ID  See pulm    Prophylaxis:  - DVT: SCDs   - GI: none

## 2023-09-29 NOTE — H&P ADULT - ASSESSMENT
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 The patient was admitted to the MICU and Palliative Care was consulted. The patient lost all decision-making capacity due to her stroke and her family opted for comfort-focused care with DNR/DNI code status. The patient underwent compassionate extubation 9/29 and is admitted to inpatient hospice at [facility] for management of end-of-life symptoms requiring skilled nursing assessment and administration of IV medications as she has no oral route.

## 2023-09-29 NOTE — H&P ADULT - HISTORY OF PRESENT ILLNESS
Hospice GIP Admission  [facility] Geriatrics and Palliative Care / [facility]: Call [facility] at [phone] or 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 The patient was admitted to the MICU and Palliative Care was consulted. The patient lost all decision-making capacity due to her stroke and her family opted for comfort-focused care with DNR/DNI code status. The patient underwent compassionate extubation 9/29 and is admitted to inpatient hospice at [facility] for management of end-of-life symptoms requiring skilled nursing assessment and administration of IV medications as she has no oral route. The patient's ICU course is as follows per the provider discharge note:    Hospital Course:  Discharge Date	29-Sep-2023  Admission Date	16-Sep-2023 00:13  Reason for Admission	stroke  Hospital Course	  #Discharge: do not delete     Alda Connor is a with past medical history of well controlled HTN, HLD and T2DM  presenting to ED after being found down by neighbor and found to have  left-sided MCA thrombus. Admitted to MICU for stroke management.     #MCA Thrombus  Pt intubated @ [facility] i/s/o airway protection for suspected large L  MCA stroke. CT brain stroke protocol showing opacified b/l MCAs L>R, cannot  exclude thrombus. Per CT brain perfusion showing infarction around MCA  territory which is possibly too substantial to allow for adequate brain  recovery even if intervention is done on thrombus, no surgical intervention by  neurosurgery. Repeat CT heads obtained to evaluate for peak swelling and  treated with hypertonic saline and mannitol to reach goal Na of 145-155. vEEG  performed with no evidence of seizures. Patient is AAOx0. Has faint movement of  extremities and blinks eyes but does not track eye movements or respond.  Palliative team consulted and GOC discussions had with family. Decision made to  extubate on 09/29 and transfer patient to hospice care for comfort measures.  - Continue to provide ongoing support to family during GOC discussions     #Secondary stroke prevention  - Continue ASA 81mg and Atorvastatin 80mg daily     #Afib  Few episodes of atrial fibrillation with RVR. Treated with IV lopressor.  Resolved at time of discharge.  - Continue to monitor heart rate  - Continue with lopressor 12.5 mg BID with hold parameters     #Acute Hypoxic Respiratory Failure i/s/o MCA Stroke w/ Asp Pna  CT Chest showing signs of possible aspiration pna i/s/o intubation. WBC 12  however likely reactive i/s/o stroke. Afebrile. Maintained aspiration  precautions. Completed 4-day course of ertapenem 1000mg IVP for 4 days  (09/20-09/23)  - Resolved     #Hx of T2DM  Hx of T2DM. A1c and home medications unknown.  - continue Kaiden     Patient was discharged to: hospice    [x]Pain/Dyspnea managed by hourly monitoring and titration of hydromorphone  [x]Pain/Dyspnea improved as a result of aggressive titration of hydromorphone  [x]GIP to manage uncontrolled pain/dyspnea and continuous titrations of hydromorphone  [x]Requires frequent skilled nursing assessment for non-verbal signs of pain/dyspnea/agitation through grimacing, groaning, tachypnea, writhing, rigidity  [x]Effectiveness of pain/tachypnea management is continuously reevaluated hourly to achieve maximal comfort    Comprehensive symptom assessment and justification of GIP level of care as noted. Patient continues to require symptom monitoring through multiple daily bedside assessments and daily intervention through the administration of PRN medications and adjustment of scheduled opiates/opiate infusion. See patient's PRN use for the past 24hrs noted below. Extensive time spent discussing care plan with family. No unexpected adverse effects of opiates noted. Plan of care discussed collaboratively with Hospice and Facility staff.    REFERRALS: [x]Hospice [x]Social Work  []Chaplaincy  []Patient/Family Support []Massage Therapy []Music Therapy    DISCUSSION OF CASE: Family - to obtain additional history and to provide emotional support; Hospice Liaison - to discuss plan of care; RN - to discuss symptom burden and regimen adjustment    PRESENT SYMPTOMS/REVIEW OF SYSTEMS: [x]Unable to obtain due to poor mentation/encephalopathy  Source if other than patient:  [x]Family   [x]Team     Pain: [x] yes [] no - see PAINAD  QOL Impact -   Location -                    Aggravating Factors -  Quality -  Radiation -  Timing -  Severity (0-10 scale) -   Minimal Acceptable Level (0-10 scale) -    PAIN AD Score: 1  (Nonverbal Pain Assessment Scale)    Dyspnea:                           [x]Mild  []Moderate []Severe  Anxiety:                             []Mild []Moderate []Severe  Fatigue:                             []Mild []Moderate []Severe  Nausea:                             []Mild []Moderate []Severe  Loss of Appetite:              []Mild []Moderate []Severe  Constipation:                    []Mild []Moderate []Severe    Other Symptoms:  []All Other Review Of Systems Negative - patient is unable to self-report    Palliative Performance Status Version 2:  10%    SOCIAL HISTORY:   Significant other/partner:  []  Children:  [x]   Substance hx:  []   Tobacco hx:  []   Alcohol hx: []  Living Situation: [x]Home  []Long term care  []Rehab []Other  Restorationism/Spiritual practice: Evangelical; Role of organized Judaism [x] important [] some [] unable to assess  Coping (family): [] well [x] with difficulty [] poor coping [] unable to assess  Support system: [x] strong [] adequate [] inadequate    ADVANCE DIRECTIVES:    [x]MOLST: DNR/DNI  DECISION MAKER(s):  [] Health Care Proxy(s)  [x] Surrogate(s)  [] Guardian           Name(s)/Phone Number(s): Guero Anaya daughter, [phone]    PHYSICAL EXAM:  GENERAL:  [] NAD []Alert []Lethargic  []Cachexia  [x]Unarousable  []Verbal  [x]Non-Verbal  Behavioral:   []Anxiety  []Delirium []Agitation []Cooperative [x]Oriented x0  HEENT:  [x]Normal  [] Moist Mucous Membranes [x]Dry mouth   []ET Tube/Trach  []Oral lesions  PULMONARY:   [x]Clear []Tachypnea  []Audible excessive secretions  []Normal Work of Breathing []Labored Breathing  []Rhonchi []Crackles []Wheezing  CARDIOVASCULAR:    [x]Regular Rate []Regular Rhythm []Irregular []Tachy  []Isma  GASTROINTESTINAL:  [x]Soft  [x]Distended   []+BS  [x]Non tender []Tender  []PEG []OGT/ NGT  Last BM:  GENITOURINARY:  []Normal [x] Incontinent   []Oliguria/Anuria   []Carlos  MUSCULOSKELETAL:   []Normal Extremities  []Weakness  [x]Bed/Wheelchair bound []Edema  NEUROLOGIC:   []No focal deficits  []Cognitive impairment  [x]Dysphagia []Dysarthria []Paresis [x]Encephalopathic  SKIN:   []Normal   []Pressure ulcer(s)  []Rash    Vital Signs Last 24 Hrs  T(C): 36.4 (29 Sep 2023 16:00), Max: 37.6 (28 Sep 2023 18:47)  T(F): 97.5 (29 Sep 2023 16:00), Max: 99.7 (28 Sep 2023 18:47)  HR: 69 (29 Sep 2023 16:00) (57 - 79)  BP: 111/75 (29 Sep 2023 16:00) (101/51 - 121/63)  BP(mean): 81 (29 Sep 2023 12:00) (71 - 92)  RR: 20 (29 Sep 2023 16:00) (12 - 30)  SpO2: 100% (29 Sep 2023 16:00) (96% - 100%)        LABS:                        11.3   12.92 )-----------( 194      ( 28 Sep 2023 05:23 )             33.4   09-28    131<L>  |  97  |  32<H>  ----------------------------<  320<H>  4.3   |  28  |  0.58    Ca    8.8      28 Sep 2023 05:23  Phos  3.8     09-28  Mg     2.2     09-28    TPro  5.8<L>  /  Alb  3.1<L>  /  TBili  0.6  /  DBili  x   /  AST  47<H>  /  ALT  16  /  AlkPhos  61  09-28    RADIOLOGY & ADDITIONAL STUDIES:  9/20/2023 CT head non-con  IMPRESSION:    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 No hemorrhagic transformation. 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 The patient was admitted to the MICU and Palliative Care was consulted. The patient lost all decision-making capacity due to her stroke and her family opted for comfort-focused care with DNR/DNI code status. The patient underwent compassionate extubation 9/29 and is admitted to inpatient hospice at [facility] for management of end-of-life symptoms requiring skilled nursing assessment and administration of IV medications as she has no oral route. The patient's ICU course is as follows per the provider discharge note:     Alda Connor is a with past medical history of well controlled HTN, HLD and T2DM  presenting to ED after being found down by neighbor and found to have  left-sided MCA thrombus. Admitted to MICU for stroke management.     #MCA Thrombus  Pt intubated @ [facility] i/s/o airway protection for suspected large L  MCA stroke. CT brain stroke protocol showing opacified b/l MCAs L>R, cannot  exclude thrombus. Per CT brain perfusion showing infarction around MCA  territory which is possibly too substantial to allow for adequate brain  recovery even if intervention is done on thrombus, no surgical intervention by  neurosurgery. Repeat CT heads obtained to evaluate for peak swelling and  treated with hypertonic saline and mannitol to reach goal Na of 145-155. vEEG  performed with no evidence of seizures. Patient is AAOx0. Has faint movement of  extremities and blinks eyes but does not track eye movements or respond.  Palliative team consulted and [facility] discussions had with family. Decision made to  extubate on 09/29 and transfer patient to hospice care for comfort measures.     #Acute Hypoxic Respiratory Failure i/s/o MCA Stroke w/ Asp Pna  CT Chest showing signs of possible aspiration pna i/s/o intubation. WBC 12  however likely reactive i/s/o stroke. Afebrile. Maintained aspiration  precautions. Completed 4-day course of ertapenem 1000mg IVP for 4 days  (09/20-09/23)    [x]Pain/Dyspnea managed by hourly monitoring and titration of hydromorphone  [x]Pain/Dyspnea improved as a result of aggressive titration of hydromorphone  [x]GIP to manage uncontrolled pain/dyspnea and continuous titrations of hydromorphone  [x]Requires frequent skilled nursing assessment for non-verbal signs of pain/dyspnea/agitation through grimacing, groaning, tachypnea, writhing, rigidity  [x]Effectiveness of pain/tachypnea management is continuously reevaluated hourly to achieve maximal comfort    Comprehensive symptom assessment and justification of GIP level of care as noted. Patient continues to require symptom monitoring through multiple daily bedside assessments and daily intervention through the administration of PRN medications and adjustment of opiates. See patient's PRN use for the past 24hrs noted below. Extensive time spent discussing care plan with family. No unexpected adverse effects of opiates noted. Plan of care discussed collaboratively with Hospice and Facility staff.    REFERRALS: [x]Hospice [x]Social Work  []Chaplaincy  []Patient/Family Support []Massage Therapy []Music Therapy    DISCUSSION OF CASE: Family - to obtain additional history and to provide emotional support; Hospice Liaison - to discuss plan of care; RN - to discuss symptom burden and regimen adjustment

## 2023-09-29 NOTE — H&P ADULT - REASON FOR ADMISSION
End-of-life comfort-focused care requiring skilled nursing assessment and IV medication administration as the patient has no oral route

## 2023-09-29 NOTE — H&P ADULT - PROBLEM SELECTOR PLAN 7
Code Status: DNR/DNI    All questions and concerns addressed with the patient's family at the bedside.    Note is not complete until co-signed by an Attending.    Emerson Bailey MD  Fellow in Hospice and Palliative Medicine  [facility] Code Status: DNR/DNI    All questions and concerns addressed with the patient's family at the bedside.    Emerson Bailey MD  Fellow in Hospice and Palliative Medicine  [facility]

## 2023-09-29 NOTE — DISCHARGE NOTE NURSING/CASE MANAGEMENT/SOCIAL WORK - NSDCPEFALRISK_GEN_ALL_CORE
For information on Fall & Injury Prevention, visit: [email or web address] OR  [email or web address] OR  https://www.cdc.gov/steadi/patient.html

## 2023-09-29 NOTE — DISCHARGE NOTE PROVIDER - ATTENDING DISCHARGE PHYSICAL EXAMINATION:
general: not following commands  HEENT: PEERL  Lungs: CTAB  Heart: RRR  Abdomen: Soft  ext: no edema

## 2023-09-29 NOTE — H&P ADULT - NSHPLABSRESULTS_GEN_ALL_CORE
CT head non-con as above LABS:                    11.3   12.92 )-----------( 194      ( 28 Sep 2023 05:23 )             33.4   09-28    131<L>  |  97  |  32<H>  ----------------------------<  320<H>  4.3   |  28  |  0.58    Ca    8.8      28 Sep 2023 05:23  Phos  3.8     09-28  Mg     2.2     09-28  TPro  5.8<L>  /  Alb  3.1<L>  /  TBili  0.6  /  DBili  x   /  AST  47<H>  /  ALT  16  /  AlkPhos  61  09-28    RADIOLOGY & ADDITIONAL STUDIES:  9/20/2023 CT head non-con  IMPRESSION: Large left MCA and PCA subacute infarcts with early improvement in mass effect since 9/19/23. No hemorrhagic transformation.

## 2023-09-29 NOTE — DISCHARGE NOTE NURSING/CASE MANAGEMENT/SOCIAL WORK - PATIENT PORTAL LINK FT
You can access the FollowMyHealth Patient Portal offered by [facility] by registering at the following website: [email or web address]. By joining [facility]’s FollowMyHealth portal, you will also be able to view your health information using other applications (apps) compatible with our system.

## 2023-09-29 NOTE — H&P ADULT - PROBLEM SELECTOR PLAN 4
Hydromorphone 0.2 mg IV q2h PRN for pain  Patient has active end-of-life symptoms requiring skilled nursing assessment and administration of IV medications as she has no oral route Dulcolax suppository PRN daily for constipation

## 2023-09-29 NOTE — PROGRESS NOTE ADULT - PROVIDER SPECIALTY LIST ADULT
MICU
MICU
Neurology
MICU
Neurology
Neurosurgery
MICU
Neurology
MICU
Neurology
Palliative Care
Palliative Care

## 2023-09-29 NOTE — H&P ADULT - NS ATTEST RISK PROBLEM GEN_ALL_CORE FT
Actively Dying  Acute Illness That Poses A Threat To Life  Abrupt Change In Neurological Status  Decision Made To De-escalate Care Due To Poor Prognosis  Prescription Of Parenteral Controlled Substances

## 2023-09-29 NOTE — PROGRESS NOTE ADULT - ATTENDING COMMENTS
Acute MCA infarct with developing thrombus, not a candidate for intervention as per neurosx. Mechanically ventilated. Withdraws to pain but otherwise no mental status. Plan for repeat imaging today to help with prognostication. Plan as per above. Poor prognosis.
Large MCA/PCA stroke  Goals of care discussions ongoing  Plan for repeat family meeting tomorrow with neurology and palliative care
Large MCA/PCA stroke  Plan for palliative extubation and hospice tomorrow
Put on CPAP during the day. Plan for ful lvent support overnight.  Dc mathew and place primafit and bladder scan
Acute MCA infarct with developing thrombus, not a candidate for intervention as per neurosx. Mechanically ventilated. Withdraws to pain but otherwise no mental status. Plan for repeat imaging in AM to help with prognostication. Needs a GOC discussion in AM. Plan as per above. Poor prognosis.
In brief, 88 year old woman who presented with a large left MCA stroke who was not a tPA or thrombectomy candidate now debilitated, intubated. followed by neurosurgery and vascular neurology, appreciate recs. Not a surgical candidate. Planning on repeat CT head and planning for family meeting regarding goals of care with palliative.    Complicated by PNA with sputum growing ESBL ecoli, being treated with ertapenem
Large MCA/PCA stroke  Goals of care discussions ongoing  Plan for repeat family meeting today with neurology and palliative care .
MCA stroke  Palliative extubate transfer to hospice
Trial pressure support during the day today
88 year old female presented with large left MCA and PCA stroke, CT with midline shift, receiving medical management and being followed by neurosurgery and vascular neurology. Palliative consulted. Repeat CTH today.   Found to have ESBL ecoli PNA started on ertapenem.  Palliative Consulted and following.
88 year old female presented with large left MCA and PCA stroke, CT with midline shift, receiving medical management and being followed by neurosurgery and vascular neurology. Palliative consulted. Repeat CTH yesterday with early improvement in midline shift. Plan for EEG today. To discuss timing of family meeting with neuro and palliative care.    Found to have ESBL ecoli PNA started on ertapenem.
Large MCA/PCA stroke  Goals of care discussions ongoing  Plan for repeat family meeting this week with neurology and palliative care
88 year old female presented with large left MCA and PCA stroke, CT with midline shift, receiving medical management and being followed by neurosurgery and vascular neurology. Palliative consulted. Repeat CTH yesterday with early improvement in midline shift.  EEG without evidence of seizure.     Found to have ESBL ecoli PNA started on ertapenem.     Joint family meeting today including vascular neurology, palliative care, and MICU discussed prognosis and options going forward including hospice and trach/peg. See goals of care note for details. We will have another family meeting on Wednesday.

## 2023-09-29 NOTE — H&P ADULT - PROBLEM SELECTOR PLAN 1
Patient with left MCA + PCA stroke with coma, subsequently lost all decision-making capacity. Family opted for comfort-focused care with DNR/DNI code status and compassionate extubation 9/29. Admitted for end-of-life comfort-focused care. Hydromorphone 0.5 mg IV q2h PRN for dyspnea  Patient has active end-of-life symptoms requiring skilled nursing assessment and administration of IV medications as she has no oral route

## 2023-09-29 NOTE — PROGRESS NOTE ADULT - REASON FOR ADMISSION
Stroke

## 2023-09-29 NOTE — H&P ADULT - ATTENDING COMMENTS
Complex symptom management in the setting of end of life.    89yo F with PMH of HTN and T2DM found to have neurologic encephalopathy and respiratory failure due to MCA/PCA stroke. Decision made to transition to symptom-directed care and inpatient hospice for management of dyspnea at end of life due to acute hypoxic respiratory failure from CVA. Appears to be imminently dying, symptomatic, and requiring IV medications due to no reliable oral route.    -Dilaudid 0.5mg IV q2h PRN for Moderate/Severe Pain or RR>22  -Ativan 0.5mg IV q4h PRN for Anxiety/Agitation  -Robinul 0.4mg IV q6h PRN for Excessive Secretions  -Dulcolax Supp PRN    If patient requires >2 Dilaudid PRNs, then schedule Dilaudid 0.5mg IV q6h ATC  If patient has refractory symptoms despite PRN use, then increase dose to Dilaudid 1mg  If patient is retaining, then place mathew for comfort  Ensure patient is receiving good oral care    Requires GIP for titration of palliative regimen for adequate symptom relief at end of life. Emotional support provided, questions answered.  Active Psychosocial Referrals:  [x]Social Work/Case management []PT/OT []Chaplaincy [x]Hospice  []Patient/Family Support []Holistic RN []Massage Therapy []Music Therapy []Ethics  Coping: [] well [] with difficulty [] poor coping [x] unable to assess  Support system: [x] strong [] adequate [] inadequate    For new or uncontrolled symptoms, please call Palliative Care at [phone]). The service is available 24/7 (including nights & weekends) to provide symptom management recommendations over the phone as appropriate

## 2023-09-29 NOTE — H&P ADULT - NSHPSOURCEINFOTX_GEN_ALL_CORE
[facility] Geriatrics and Palliative Care / [facility]: Call [facility] at [phone] or [facility] at [phone] for symptom management or to request interdisciplinary team intervention (RN/CM, SW, ) for patient/family

## 2023-09-29 NOTE — H&P ADULT - PROBLEM SELECTOR PLAN 6
Dulcolax suppository PRN daily for constipation Patient with left MCA + PCA stroke with coma, subsequently lost all decision-making capacity. Family opted for comfort-focused care with DNR/DNI code status and compassionate extubation 9/29. Admitted for end-of-life comfort-focused care.

## 2023-09-29 NOTE — H&P ADULT - NSHPPHYSICALEXAM_GEN_ALL_CORE
Constitutional: elderly woman in dying process, in no acute distress  HEENT: NCAT, MMM, no conjunctival icterus  Respiratory: moving air well, breathing non-labored, no audible wheezing  Cardiovascular: NRRR, extremities WWP, no BLE edema  Abdomen: SNTND, bowel sounds normoactive  : incontinent  Skin: no rashes, lesions, or jaundice on exposed skin  MSK: no bony deformities  Neuro: comatose, occasional non-purposeful eye opening and slight movements x 4 extremities  Psych: unresponsive PHYSICAL EXAM:  GENERAL:  [] NAD []Alert []Lethargic  []Cachexia  [x]Unarousable  []Verbal  [x]Non-Verbal  Behavioral:   []Anxiety  [x]Delirium []Agitation []Cooperative [x]Oriented x0  HEENT:  [x]Normal  [] Moist Mucous Membranes [x]Dry mouth   []ET Tube/Trach  []Oral lesions  PULMONARY:   []Clear []Tachypnea  []Audible excessive secretions  []Normal Work of Breathing [x]Labored Breathing  [x]Rhonchi []Crackles []Wheezing  CARDIOVASCULAR:    [x]Regular Rate [x]Regular Rhythm []Irregular []Tachy  []Isma  GASTROINTESTINAL:  [x]Soft  [x]Distended   []+BS  [x]Non tender []Tender  []PEG []OGT/ NGT  Last BM:  GENITOURINARY:  []Normal [x] Incontinent   []Oliguria/Anuria   []Carlos  MUSCULOSKELETAL:   []Normal Extremities  [x]Weakness  [x]Bed/Wheelchair bound []Edema  NEUROLOGIC:   []No focal deficits  []Cognitive impairment  [x]Dysphagia []Dysarthria []Paresis [x]Encephalopathic  SKIN:   [x]Normal   []Pressure ulcer(s)  []Rash    Vital Signs Last 24 Hrs  T(C): 36.4 (29 Sep 2023 16:00), Max: 37.6 (28 Sep 2023 18:47)  T(F): 97.5 (29 Sep 2023 16:00), Max: 99.7 (28 Sep 2023 18:47)  HR: 69 (29 Sep 2023 16:00) (57 - 79)  BP: 111/75 (29 Sep 2023 16:00) (101/51 - 121/63)  BP(mean): 81 (29 Sep 2023 12:00) (71 - 92)  RR: 20 (29 Sep 2023 16:00) (12 - 30)  SpO2: 100% (29 Sep 2023 16:00) (96% - 100%)

## 2023-09-30 NOTE — CHART NOTE - NSCHARTNOTEFT_GEN_A_CORE
At 8PM, family requested to speak with provider about taking patient off hospice. Family members voiced that the patient seemed to be clinically improving today (opening eyes, tracking, taking small sips of water). Family was concerned that hospice would mean "leaving her to die" and withholding medical treatments. HEAL line was consulted, and advice was communicated to family. Had extensive conversation with family- we agreed that no medical interventions at this time would pose significant benefit to prognosis. Family expressed understanding that brain damage from patient's stroke is permanent. I explained that the patient's apparent improvement in status may be due to symptom management on hospice service. Family expressed concern that pt would be neglected. Reassurance was provided that pt would be assessed every day and appropriate changes would be made to management. Ongoing conversation about whether pt should receive nutritional supplementation, will reassess tomorrow. No change to code status at this time- remains full comfort care.

## 2023-10-01 NOTE — DIETITIAN INITIAL EVALUATION ADULT - OTHER INFO
88-year-old woman with HTN, HLD, T2DM who is admitted to 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 The patient was admitted to the MICU and Palliative Care was consulted. The patient lost all decision-making capacity due to her stroke and her family opted for comfort-focused care with DNR/DNI code status. The patient underwent compassionate extubation 9/29 and is admitted to inpatient hospice at [facility] for management of end-of-life symptoms requiring skilled nursing assessment and administration of IV medications as she has no oral route.     Patient and family seen at bedside for nutrition assessment. Previously on enteral nutrition support, now NPO and transferred to inpatient hospice. No nausea/vomiting/diarrhea/constipation noted, last BM 9/30 per EMR. No PI documented, noted with +2 BL arm edema. Unable to perform NFPE, family reports # and denies recent weight loss. GOC aligned with comfort measures - order in chart. Pt can be determined at low complexity and follow up by dietitian by consult only. See nutrition recommendations below.  88-year-old woman with HTN, HLD, T2DM who is admitted to 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 The patient was admitted to the MICU and Palliative Care was consulted. The patient lost all decision-making capacity due to her stroke and her family opted for comfort-focused care with DNR/DNI code status. The patient underwent compassionate extubation 9/29 and is admitted to inpatient hospice at [facility] for management of end-of-life symptoms requiring skilled nursing assessment and administration of IV medications as she has no oral route.     Patient and family seen at bedside for nutrition assessment. Previously on enteral nutrition support, now NPO and transferred to inpatient hospice. No nausea/vomiting/diarrhea/constipation noted, last BM 9/30 per EMR. No PI documented, noted with +2 BL arm edema. NFPE deferred, family reports # and denies recent weight loss. GOC aligned with comfort measures - order in chart. Pt can be determined at low complexity and follow up by dietitian by consult only. See nutrition recommendations below.

## 2023-10-01 NOTE — DIETITIAN INITIAL EVALUATION ADULT - PROBLEM SELECTOR PLAN 1
Hydromorphone 0.5 mg IV q2h PRN for dyspnea  Patient has active end-of-life symptoms requiring skilled nursing assessment and administration of IV medications as she has no oral route

## 2023-10-01 NOTE — DIETITIAN INITIAL EVALUATION ADULT - OTHER CALCULATIONS
Based on Standards of Care patient within % IBW (112%) thus actual body weight used for all calculations (112.2#). Needs adjusted for advanced age.

## 2023-10-01 NOTE — DIETITIAN INITIAL EVALUATION ADULT - PERTINENT MEDS FT
MEDICATIONS  (STANDING):  glycopyrrolate Injectable 0.4 milliGRAM(s) IV Push every 6 hours  scopolamine 1 mG/72 Hr(s) Patch 1 Patch Transdermal every 72 hours    MEDICATIONS  (PRN):  acetaminophen  Suppository .. 650 milliGRAM(s) Rectal every 6 hours PRN Temp greater or equal to 38C (100.4F), Mild Pain (1 - 3)  bisacodyl Suppository 10 milliGRAM(s) Rectal daily PRN Constipation  HYDROmorphone  Injectable 0.5 milliGRAM(s) IV Push every 2 hours PRN Moderate pain (4-6), Severe pain (7-10), Respiratory rate greater than 22  LORazepam   Injectable 0.5 milliGRAM(s) IV Push every 4 hours PRN Anxiety  ondansetron Injectable 4 milliGRAM(s) IV Push every 6 hours PRN Nausea and/or Vomiting

## 2023-10-01 NOTE — DIETITIAN INITIAL EVALUATION ADULT - ADD RECOMMEND
1. Continue diet as ordered.   >>Maintain nutrition within GOC at all times.   >>Recommend comfort feeds if pt alert and expressing desire to eat. Do not force feed. Honor all food preferences as able.  2. Monitor GI tolerance, weight trends, labs, & skin integrity as appropriate.  3. Defer bowel and pain regimens to team.   4. RD to remain available for dietary intervention prn   *Education not appropriate at this time, RD remains available for dietary education prn

## 2023-10-01 NOTE — DIETITIAN INITIAL EVALUATION ADULT - PROBLEM SELECTOR PLAN 7
Code Status: DNR/DNI    All questions and concerns addressed with the patient's family at the bedside.    Emerson Bailey MD  Fellow in Hospice and Palliative Medicine  [facility]

## 2023-10-01 NOTE — DIETITIAN INITIAL EVALUATION ADULT - PROBLEM SELECTOR PLAN 3
Hydromorphone 0.5 mg IV q2h PRN for pain  Patient has active end-of-life symptoms requiring skilled nursing assessment and administration of IV medications as she has no oral route

## 2023-10-01 NOTE — DIETITIAN INITIAL EVALUATION ADULT - PROBLEM SELECTOR PLAN 6
Patient with left MCA + PCA stroke with coma, subsequently lost all decision-making capacity. Family opted for comfort-focused care with DNR/DNI code status and compassionate extubation 9/29. Admitted for end-of-life comfort-focused care.

## 2023-10-03 NOTE — PROGRESS NOTE ADULT - PROBLEM SELECTOR PLAN 7
Code Status: DNR/DNI    All questions and concerns addressed with the patient's family at the bedside.    Note is not complete until co-signed by an Attending.    Emerson Bailey MD  Fellow in Hospice and Palliative Medicine  [facility].
Code Status: DNR/DNI    All questions and concerns addressed with the patient's family at the bedside.
Code Status: DNR/DNI    All questions and concerns addressed with the patient's family at the bedside.    Emerson Bailey MD  Fellow in Hospice and Palliative Medicine  [facility]
Code Status: DNR/DNI    All questions and concerns addressed with the patient's family at the bedside.    Note is not complete until co-signed by an Attending.    Emerson Bailey MD  Fellow in Hospice and Palliative Medicine  [facility].

## 2023-10-03 NOTE — PROGRESS NOTE ADULT - PROBLEM SELECTOR PROBLEM 4
At high risk for constipation
At high risk for pain
At high risk for constipation
At high risk for pain

## 2023-10-03 NOTE — PROGRESS NOTE ADULT - PROBLEM SELECTOR PROBLEM 6
At high risk for constipation
Ischemic stroke with coma
Ischemic stroke with coma
At high risk for constipation

## 2023-10-03 NOTE — PROGRESS NOTE ADULT - PROBLEM SELECTOR PLAN 3
Glycopyrrolate 0.4 mg IV q6h PRN
Hydromorphone 0.5 mg IV q2h PRN for pain  Patient has active end-of-life symptoms requiring skilled nursing assessment and administration of IV medications as she has no oral route
Hydromorphone 0.5 mg IV q2h PRN for pain  Patient has active end-of-life symptoms requiring skilled nursing assessment and administration of IV medications as she has no oral route
10/3 schedule glycopyrrolate 0.4 mg IV q6h ATC

## 2023-10-03 NOTE — PROGRESS NOTE ADULT - PROBLEM SELECTOR PLAN 2
Glycopyrrolate 0.4 mg IV q6h PRN
Glycopyrrolate 0.4 mg IV q6h PRN
Hydromorphone 0.5 mg IV q2h PRN for dyspnea    Patient has required 3 doses over the last 24 hours.    Patient has active end-of-life symptoms requiring skilled nursing assessment and administration of IV medications as she has no oral route.
10/3 schedule hydromorphone 0.5 mg IV q6h ATC    Hydromorphone 0.5 mg IV q2h PRN for dyspnea    Patient has required 6 doses over the last 24 hours.    Patient has active end-of-life symptoms requiring skilled nursing assessment and administration of IV medications as she has no oral route.

## 2023-10-03 NOTE — PROGRESS NOTE ADULT - PAIN ASSESSMENT ADVANCED DEMENTIA: BREATHING
Occasional labored breathing. Short period of hyperventilation
Occasional labored breathing. Short period of hyperventilation

## 2023-10-03 NOTE — PROGRESS NOTE ADULT - PROBLEM SELECTOR PLAN 5
Lorazepam 0.5 mg IV q4h PRN

## 2023-10-03 NOTE — PROGRESS NOTE ADULT - ASSESSMENT
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 The patient was admitted to the MICU and Palliative Care was consulted. The patient lost all decision-making capacity due to her stroke and her family opted for comfort-focused care with DNR/DNI code status. The patient underwent compassionate extubation 9/29 and is admitted to inpatient hospice at [facility] for management of end-of-life symptoms requiring skilled nursing assessment and administration of IV medications as she has no oral route.
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 The patient was admitted to the MICU and Palliative Care was consulted. The patient lost all decision-making capacity due to her stroke and her family opted for comfort-focused care with DNR/DNI code status. The patient underwent compassionate extubation 9/29 and is admitted to inpatient hospice at [facility] for management of end-of-life symptoms requiring skilled nursing assessment and administration of IV medications as she has no oral route.
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 The patient was admitted to the MICU and Palliative Care was consulted. The patient lost all decision-making capacity due to her stroke and her family opted for comfort-focused care with DNR/DNI code status. The patient underwent compassionate extubation 9/29 and is admitted to inpatient hospice at [facility] for management of end-of-life symptoms requiring skilled nursing assessment and administration of IV medications as she has no oral route.
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 The patient was admitted to the MICU and Palliative Care was consulted. The patient lost all decision-making capacity due to her stroke and her family opted for comfort-focused care with DNR/DNI code status. The patient underwent compassionate extubation 9/29 and is admitted to inpatient hospice at [facility] for management of end-of-life symptoms requiring skilled nursing assessment and administration of IV medications as she has no oral route.

## 2023-10-03 NOTE — PROGRESS NOTE ADULT - PROBLEM SELECTOR PLAN 6
Patient with left MCA + PCA stroke with coma, subsequently lost all decision-making capacity. Family opted for comfort-focused care with DNR/DNI code status and compassionate extubation 9/29. Admitted for end-of-life comfort-focused care.
Patient with left MCA + PCA stroke with coma, subsequently lost all decision-making capacity. Family opted for comfort-focused care with DNR/DNI code status and compassionate extubation 9/29. Admitted for end-of-life comfort-focused care.
Dulcolax suppository PRN daily for constipation
Dulcolax suppository PRN daily for constipation

## 2023-10-03 NOTE — PROGRESS NOTE ADULT - PROBLEM SELECTOR PLAN 4
Hydromorphone 0.5 mg IV q2h PRN for pain  Patient has active end-of-life symptoms requiring skilled nursing assessment and administration of IV medications as she has no oral route.
Dulcolax suppository PRN daily for constipation
Dulcolax suppository PRN daily for constipation
Hydromorphone 0.5 mg IV q2h PRN for pain  Patient has active end-of-life symptoms requiring skilled nursing assessment and administration of IV medications as she has no oral route.

## 2023-10-03 NOTE — PROGRESS NOTE ADULT - PROBLEM SELECTOR PLAN 1
Hydromorphone 0.5 mg IV q2h PRN for dyspnea  Patient has active end-of-life symptoms requiring skilled nursing assessment and administration of IV medications as she has no oral route
Hydromorphone 0.5 mg IV q2h PRN for dyspnea  Patient has active end-of-life symptoms requiring skilled nursing assessment and administration of IV medications as she has no oral route
Patient with left MCA + PCA stroke with coma, subsequently lost all decision-making capacity. Family opted for comfort-focused care with DNR/DNI code status and compassionate extubation 9/29. Admitted for end-of-life comfort-focused care.
Patient with left MCA + PCA stroke with coma, subsequently lost all decision-making capacity. Family opted for comfort-focused care with DNR/DNI code status and compassionate extubation 9/29. Admitted for end-of-life comfort-focused care.

## 2023-10-03 NOTE — PROGRESS NOTE ADULT - SUBJECTIVE AND OBJECTIVE BOX
OVERNIGHT EVENTS: none    SUBJECTIVE / INTERVAL HPI: Patient seen and examined at bedside. Unable to communicate due to mental status. See chart note    VITAL SIGNS:  Vital Signs Last 24 Hrs  T(C): 36.4 (30 Sep 2023 13:54), Max: 36.4 (30 Sep 2023 13:54)  T(F): 97.6 (30 Sep 2023 13:54), Max: 97.6 (30 Sep 2023 13:54)  HR: 161 (30 Sep 2023 13:54) (70 - 161)  BP: 95/64 (30 Sep 2023 13:54) (95/64 - 118/77)  BP(mean): --  RR: 20 (30 Sep 2023 13:54) (20 - 20)  SpO2: 95% (30 Sep 2023 13:54) (95% - 98%)    Parameters below as of 30 Sep 2023 13:54  Patient On (Oxygen Delivery Method): nasal cannula  O2 Flow (L/min): 2      PHYSICAL EXAM:    General: NAD  HEENT: NCAT  Neck: supple, trachea midline  Cardiovascular: S1, S2 normal; RRR, no M/G/R  Respiratory: coarse breath sounds  Gastrointestinal: soft, nontender, nondistended. bowel sounds present.  Skin: no ulcerations or visible rashes appreciated  Extremities: WWP; no edema, clubbing or cyanosis  Vascular: 2+ radial, DP/PT pulses B/L  Neurological: opens eyes, no tracking, does not follow commands    MEDICATIONS:  MEDICATIONS  (STANDING):  glycopyrrolate Injectable 0.4 milliGRAM(s) IV Push every 6 hours  scopolamine 1 mG/72 Hr(s) Patch 1 Patch Transdermal every 72 hours    MEDICATIONS  (PRN):  acetaminophen  Suppository .. 650 milliGRAM(s) Rectal every 6 hours PRN Temp greater or equal to 38C (100.4F), Mild Pain (1 - 3)  bisacodyl Suppository 10 milliGRAM(s) Rectal daily PRN Constipation  HYDROmorphone  Injectable 0.5 milliGRAM(s) IV Push every 2 hours PRN Moderate pain (4-6), Severe pain (7-10), Respiratory rate greater than 22  LORazepam   Injectable 0.5 milliGRAM(s) IV Push every 4 hours PRN Anxiety  ondansetron Injectable 4 milliGRAM(s) IV Push every 6 hours PRN Nausea and/or Vomiting      ALLERGIES:  Allergies    No Known Allergies    Intolerances        LABS:              CAPILLARY BLOOD GLUCOSE      POCT Blood Glucose.: 259 mg/dL (29 Sep 2023 06:25)      RADIOLOGY & ADDITIONAL TESTS: Reviewed.
OVERNIGHT EVENTS: none    SUBJECTIVE / INTERVAL HPI: Patient seen and examined at bedside. Unable to communicate due to mental status. See chart note      Vital Signs Last 24 Hrs  T(C): 36.6 (01 Oct 2023 06:56), Max: 36.6 (01 Oct 2023 06:56)  T(F): 97.9 (01 Oct 2023 06:56), Max: 97.9 (01 Oct 2023 06:56)  HR: 91 (01 Oct 2023 06:56) (91 - 161)  BP: 112/77 (01 Oct 2023 06:56) (95/64 - 112/77)  BP(mean): --  RR: 18 (01 Oct 2023 06:56) (18 - 20)  SpO2: 90% (01 Oct 2023 06:56) (90% - 95%)    Parameters below as of 01 Oct 2023 06:56  Patient On (Oxygen Delivery Method): nasal cannula  O2 Flow (L/min): 2        PHYSICAL EXAM:    General: NAD  HEENT: NCAT  Neck: supple, trachea midline  Cardiovascular: S1, S2 normal; RRR, no M/G/R  Respiratory: coarse breath sounds  Gastrointestinal: soft, nontender, nondistended. bowel sounds present.  Skin: no ulcerations or visible rashes appreciated  Extremities: WWP; no edema, clubbing or cyanosis  Vascular: 2+ radial, DP/PT pulses B/L  Neurological: opens eyes, no tracking, does not follow commands        MEDICATIONS  (STANDING):  glycopyrrolate Injectable 0.4 milliGRAM(s) IV Push every 6 hours  scopolamine 1 mG/72 Hr(s) Patch 1 Patch Transdermal every 72 hours    MEDICATIONS  (PRN):  acetaminophen  Suppository .. 650 milliGRAM(s) Rectal every 6 hours PRN Temp greater or equal to 38C (100.4F), Mild Pain (1 - 3)  bisacodyl Suppository 10 milliGRAM(s) Rectal daily PRN Constipation  HYDROmorphone  Injectable 0.5 milliGRAM(s) IV Push every 2 hours PRN Moderate pain (4-6), Severe pain (7-10), Respiratory rate greater than 22  LORazepam   Injectable 0.5 milliGRAM(s) IV Push every 4 hours PRN Anxiety  ondansetron Injectable 4 milliGRAM(s) IV Push every 6 hours PRN Nausea and/or Vomiting  
Hospice GIP Daily Documentation  [facility] / [facility]  Contact Info: Call [facility] at [phone] or [facility] at [phone] for symptom management or to request interdisciplinary team intervention (RN/TOMAS, MALIHA, ) for patient/family    SYMPTOMS REQUIRING GIP LEVEL OF CARE:  [x]Pain  [x]Dyspnea  []Anxiety/Agitation  []Nausea  []Seizure    HPI/INTERVAL EVENTS:   Patient received PRN IV dilaudid x 1 on 10/1 and x 2 so far on 10/2 for symptom management.    [x]Pain/Dyspnea managed by hourly monitoring and titration of hydromorphone  [x]Pain/Dyspnea improved as a result of aggressive titration of hydromorphone  [x]GIP to manage uncontrolled pain/dyspnea and continuous titrations of hydromorphone  [x]Requires frequent skilled nursing assessment for non-verbal signs of pain/dyspnea/agitation through grimacing, groaning, tachypnea, writhing, rigidity  [x]Effectiveness of pain/tachypnea management is continuously reevaluated hourly to achieve maximal comfort    Overnight events discussed with nursing staff. Comprehensive symptom assessment and justification of GIP level of care as noted. Patient continues to require symptom monitoring through multiple daily bedside assessments and daily intervention through the administration of PRN medications and adjustment of scheduled opiates/opiate infusion. See patient's PRN use for the past 24hrs noted below. Extensive time spent discussing care plan with family. No unexpected adverse effects of opiates noted. Plan of care discussed collaboratively with Hospice and Facility staff.    ADVANCE DIRECTIVES:    [x]MOLST: DNR/DNI    ALLERGIES:  No Known Allergies    MEDICATIONS  (STANDING):  glycopyrrolate Injectable 0.4 milliGRAM(s) IV Push every 6 hours  HYDROmorphone  Injectable 0.5 milliGRAM(s) IV Push every 6 hours  scopolamine 1 mG/72 Hr(s) Patch 1 Patch Transdermal every 72 hours    MEDICATIONS  (PRN):  acetaminophen  Suppository .. 650 milliGRAM(s) Rectal every 6 hours PRN Temp greater or equal to 38C (100.4F), Mild Pain (1 - 3)  bisacodyl Suppository 10 milliGRAM(s) Rectal daily PRN Constipation  HYDROmorphone  Injectable 0.5 milliGRAM(s) IV Push every 2 hours PRN Moderate pain (4-6), Severe pain (7-10), Respiratory rate greater than 22  LORazepam   Injectable 0.5 milliGRAM(s) IV Push every 4 hours PRN Anxiety  ondansetron Injectable 4 milliGRAM(s) IV Push every 6 hours PRN Nausea and/or Vomiting      Analgesic Use (Scheduled and PRNs) for past 24 hours:    HYDROmorphone  Injectable 0.5 mg IV x 4 used: 10/2 at 0041, 0654, 1641, 2352; 10/3 at 0609, 0928      PRESENT SYMPTOMS/REVIEW OF SYSTEMS:  Source if other than patient:  []Family   [x]Team     Pain: [] yes [] no - see PAINAD  QOL Impact -   Location -                    Aggravating Factors -  Quality -  Radiation -  Timing -  Severity (0-10 scale) -   Minimal Acceptable Level (0-10 scale) -    PAINAD Score: 2    Dyspnea:                           []Mild  []Moderate []Severe  Anxiety:                             []Mild []Moderate []Severe  Fatigue:                             []Mild []Moderate []Severe  Nausea:                             []Mild []Moderate []Severe  Loss of Appetite:              []Mild []Moderate []Severe  Constipation:                    []Mild []Moderate []Severe    Other Symptoms:  []All Other Review Of Systems Negative - [x]Unable to obtain due to poor mentation/encephalopathy    Palliative Performance Status Version 2:  10%    PHYSICAL EXAM:  GENERAL:  [] NAD []Alert []Lethargic  []Cachexia  [x]Unarousable  []Verbal  [x]Non-Verbal  Behavioral:   []Anxiety  [x]Delirium []Agitation []Cooperative [x]Oriented x0  HEENT:  [x]Normal  [] Moist Mucous Membranes [x]Dry mouth   []ET Tube/Trach  []Oral lesions  PULMONARY:   []Clear []Tachypnea  [x]Audible excessive secretions  []Normal Work of Breathing [x]Labored Breathing  [x]Rhonchi []Crackles []Wheezing  CARDIOVASCULAR:    [x]Regular Rate [x]Regular Rhythm []Irregular []Tachy  []Isma  GASTROINTESTINAL:  [x]Soft  [x]Distended   []+BS  [x]Non tender []Tender  []PEG []OGT/ NGT  Last BM:  GENITOURINARY:  []Normal [x] Incontinent   []Oliguria/Anuria   []Carlos  MUSCULOSKELETAL:   []Normal Extremities  [x]Weakness  [x]Bed/Wheelchair bound []Edema  NEUROLOGIC:   []No focal deficits  []Cognitive impairment  [x]Dysphagia []Dysarthria []Paresis [x]Encephalopathic  SKIN:   [x]Normal   []Pressure ulcer(s)  []Rash    Vital Signs Last 24 Hrs  T(C): 37.1 (03 Oct 2023 12:13), Max: 38.1 (02 Oct 2023 18:13)  T(F): 98.8 (03 Oct 2023 12:13), Max: 100.5 (02 Oct 2023 18:13)  HR: 145 (03 Oct 2023 12:13) (145 - 187)  BP: 136/96 (03 Oct 2023 12:13) (92/70 - 136/96)  BP(mean): --  RR: 20 (03 Oct 2023 12:13) (20 - 24)  SpO2: 94% (03 Oct 2023 12:13) (87% - 94%)    Parameters below as of 03 Oct 2023 12:13  Patient On (Oxygen Delivery Method): nasal cannula  O2 Flow (L/min): 2      LABS/IMAGING: None new    REFERRALS: [x]Hospice [x]Social Work  []Chaplaincy  []Patient/Family Support []Massage Therapy []Music Therapy []Holistic RN    DISCUSSION OF CASE: Family - to discuss medication changes, provide emotional support, and expected outcomes; Hospice Liaison - to discuss plan of care and symptom regimen; RN - to discuss symptom burden and regimen adjustment
Hospice GIP Daily Documentation  [facility] / [facility]  Contact Info: Call [facility] at [phone] or [facility] at [phone] for symptom management or to request interdisciplinary team intervention (RN/TOMAS, MALIHA, ) for patient/family    SYMPTOMS REQUIRING GIP LEVEL OF CARE:  [x]Pain  [x]Dyspnea  []Anxiety/Agitation  []Nausea  []Seizure    HPI/INTERVAL EVENTS:   Patient received PRN IV dilaudid x 1 on 10/1 and x 2 so far on 10/2 for symptom management.    [x]Pain/Dyspnea managed by hourly monitoring and titration of hydromorphone  [x]Pain/Dyspnea improved as a result of aggressive titration of hydromorphone  [x]GIP to manage uncontrolled pain/dyspnea and continuous titrations of hydromorphone  [x]Requires frequent skilled nursing assessment for non-verbal signs of pain/dyspnea/agitation through grimacing, groaning, tachypnea, writhing, rigidity  [x]Effectiveness of pain/tachypnea management is continuously reevaluated hourly to achieve maximal comfort    Overnight events discussed with nursing staff. Comprehensive symptom assessment and justification of GIP level of care as noted. Patient continues to require symptom monitoring through multiple daily bedside assessments and daily intervention through the administration of PRN medications and adjustment of scheduled opiates/opiate infusion. See patient's PRN use for the past 24hrs noted below. Extensive time spent discussing care plan with family. No unexpected adverse effects of opiates noted. Plan of care discussed collaboratively with Hospice and Facility staff.    ADVANCE DIRECTIVES:    [x]MOLST: DNR/DNI    ALLERGIES:  No Known Allergies    MEDICATIONS  (STANDING):  glycopyrrolate Injectable 0.4 milliGRAM(s) IV Push every 6 hours  scopolamine 1 mG/72 Hr(s) Patch 1 Patch Transdermal every 72 hours    MEDICATIONS  (PRN):  acetaminophen  Suppository .. 650 milliGRAM(s) Rectal every 6 hours PRN Temp greater or equal to 38C (100.4F), Mild Pain (1 - 3)  bisacodyl Suppository 10 milliGRAM(s) Rectal daily PRN Constipation  HYDROmorphone  Injectable 0.5 milliGRAM(s) IV Push every 2 hours PRN Moderate pain (4-6), Severe pain (7-10), Respiratory rate greater than 22  LORazepam   Injectable 0.5 milliGRAM(s) IV Push every 4 hours PRN Anxiety  ondansetron Injectable 4 milliGRAM(s) IV Push every 6 hours PRN Nausea and/or Vomiting    Analgesic Use (Scheduled and PRNs) for past 24 hours:    HYDROmorphone  Injectable   0.5 milliGRAM(s) IV Push (10-02-23 @ 06:54)   0.5 milliGRAM(s) IV Push (10-02-23 @ 00:41)      PRESENT SYMPTOMS/REVIEW OF SYSTEMS:  Source if other than patient:  []Family   [x]Team     Pain: [] yes [] no - see PAINAD  QOL Impact -   Location -                    Aggravating Factors -  Quality -  Radiation -  Timing -  Severity (0-10 scale) -   Minimal Acceptable Level (0-10 scale) -    PAINAD Score: 1    Dyspnea:                           []Mild  []Moderate []Severe  Anxiety:                             []Mild []Moderate []Severe  Fatigue:                             []Mild []Moderate []Severe  Nausea:                             []Mild []Moderate []Severe  Loss of Appetite:              []Mild []Moderate []Severe  Constipation:                    []Mild []Moderate []Severe    Other Symptoms:  []All Other Review Of Systems Negative - [x]Unable to obtain due to poor mentation/encephalopathy    Palliative Performance Status Version 2:  10%    PHYSICAL EXAM:  GENERAL:  [] NAD []Alert []Lethargic  []Cachexia  [x]Unarousable  []Verbal  [x]Non-Verbal  Behavioral:   []Anxiety  [x]Delirium []Agitation []Cooperative [x]Oriented x0  HEENT:  [x]Normal  [] Moist Mucous Membranes [x]Dry mouth   []ET Tube/Trach  []Oral lesions  PULMONARY:   []Clear []Tachypnea  []Audible excessive secretions  []Normal Work of Breathing [x]Labored Breathing  [x]Rhonchi []Crackles []Wheezing  CARDIOVASCULAR:    [x]Regular Rate [x]Regular Rhythm []Irregular []Tachy  []Isma  GASTROINTESTINAL:  [x]Soft  [x]Distended   []+BS  [x]Non tender []Tender  []PEG []OGT/ NGT  Last BM:  GENITOURINARY:  []Normal [x] Incontinent   []Oliguria/Anuria   []Carlos  MUSCULOSKELETAL:   []Normal Extremities  [x]Weakness  [x]Bed/Wheelchair bound []Edema  NEUROLOGIC:   []No focal deficits  []Cognitive impairment  [x]Dysphagia []Dysarthria []Paresis [x]Encephalopathic  SKIN:   [x]Normal   []Pressure ulcer(s)  []Rash    Vital Signs Last 24 Hrs  T(C): 36.8 (02 Oct 2023 06:55), Max: 36.8 (02 Oct 2023 06:55)  T(F): 98.3 (02 Oct 2023 06:55), Max: 98.3 (02 Oct 2023 06:55)  HR: --  BP: --  BP(mean): --  RR: --  SpO2: --        LABS/IMAGING: None new    REFERRALS: [x]Hospice [x]Social Work  []Chaplaincy  []Patient/Family Support []Massage Therapy []Music Therapy []Holistic RN    DISCUSSION OF CASE: Family - to discuss medication changes, provide emotional support, and expected outcomes; Hospice Liaison - to discuss plan of care and symptom regimen; RN - to discuss symptom burden and regimen adjustment

## 2023-10-03 NOTE — PROGRESS NOTE ADULT - ATTENDING COMMENTS
Patient seen and examined.  Acute CVA, respiratory failure and excessive secretions with no oral route.  Patient is currently on inpatient hospice, receiving end of life care, requiring frequent nursing attention as well as IV opiates for symptom control.
Patient seen and examined.  Agree with fellow note as above.  Patient with devastating CVA, currently with worsening respiratory status, requiring IV Dilaudid.  Standing Dilaudid 0.5mg IV Q6 started, in addition to prn dosages. Used 3 doses in 24 hours.  No oral route.

## 2023-10-04 NOTE — DISCHARGE NOTE FOR THE EXPIRED PATIENT - DEATH FALLS UNDER ME JURISDICTION?
[[email or web address]] : This note was written by Stephania Burns on 01/24/2022 acting as scribe for Zay Stevens III, MD 
No

## 2023-10-04 NOTE — DISCHARGE NOTE FOR THE EXPIRED PATIENT - HOSPITAL COURSE
Alda Connor is an 88-year-old woman with HTN, HLD, T2DM who was admitted to 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 The patient was admitted to the MICU and Palliative Care was consulted. The patient lost all decision-making capacity due to her stroke and her family opted for comfort-focused care with DNR/DNI code status. The patient underwent compassionate extubation 9/29 and was admitted to inpatient hospice at [facility] for management of end-of-life symptoms requiring skilled nursing assessment and administration of IV medications as she has no oral route. On the medical floor, patient was treated with comfort measures. Goals of care discussions were carried with family. Patient passed away on 10/4 at 13:10 peacefully due to respiratory failure in the setting of ischemic stroke with coma.

## 2023-10-09 DIAGNOSIS — Z51.5 ENCOUNTER FOR PALLIATIVE CARE: ICD-10-CM

## 2023-10-09 DIAGNOSIS — G93.40 ENCEPHALOPATHY, UNSPECIFIED: ICD-10-CM

## 2023-10-09 DIAGNOSIS — J96.01 ACUTE RESPIRATORY FAILURE WITH HYPOXIA: ICD-10-CM

## 2023-10-09 DIAGNOSIS — F41.9 ANXIETY DISORDER, UNSPECIFIED: ICD-10-CM

## 2023-10-09 DIAGNOSIS — Z66 DO NOT RESUSCITATE: ICD-10-CM

## 2023-10-09 DIAGNOSIS — J69.0 PNEUMONITIS DUE TO INHALATION OF FOOD AND VOMIT: ICD-10-CM

## 2023-10-09 DIAGNOSIS — R40.2A NONTRAUMATIC COMA DUE TO UNDERLYING CONDITION: ICD-10-CM

## 2023-10-09 DIAGNOSIS — E78.5 HYPERLIPIDEMIA, UNSPECIFIED: ICD-10-CM

## 2023-10-09 DIAGNOSIS — I63.312 CEREBRAL INFARCTION DUE TO THROMBOSIS OF LEFT MIDDLE CEREBRAL ARTERY: ICD-10-CM

## 2023-10-09 DIAGNOSIS — I10 ESSENTIAL (PRIMARY) HYPERTENSION: ICD-10-CM
